# Patient Record
Sex: MALE | Race: WHITE | Employment: OTHER | URBAN - METROPOLITAN AREA
[De-identification: names, ages, dates, MRNs, and addresses within clinical notes are randomized per-mention and may not be internally consistent; named-entity substitution may affect disease eponyms.]

---

## 2022-02-08 DIAGNOSIS — D64.9 ANEMIA, UNSPECIFIED TYPE: ICD-10-CM

## 2022-02-08 RX ORDER — SODIUM CHLORIDE 0.9 % (FLUSH) 0.9 %
5-40 SYRINGE (ML) INJECTION PRN
Status: CANCELLED | OUTPATIENT
Start: 2022-02-09

## 2022-02-08 RX ORDER — ALBUTEROL SULFATE 90 UG/1
4 AEROSOL, METERED RESPIRATORY (INHALATION) PRN
Status: CANCELLED | OUTPATIENT
Start: 2022-02-09

## 2022-02-08 RX ORDER — SODIUM CHLORIDE 9 MG/ML
INJECTION, SOLUTION INTRAVENOUS CONTINUOUS
Status: CANCELLED | OUTPATIENT
Start: 2022-02-09

## 2022-02-08 RX ORDER — DIPHENHYDRAMINE HYDROCHLORIDE 50 MG/ML
50 INJECTION INTRAMUSCULAR; INTRAVENOUS
Status: CANCELLED | OUTPATIENT
Start: 2022-02-09

## 2022-02-08 RX ORDER — HEPARIN SODIUM (PORCINE) LOCK FLUSH IV SOLN 100 UNIT/ML 100 UNIT/ML
500 SOLUTION INTRAVENOUS PRN
Status: CANCELLED | OUTPATIENT
Start: 2022-02-09

## 2022-02-08 RX ORDER — ONDANSETRON 2 MG/ML
8 INJECTION INTRAMUSCULAR; INTRAVENOUS
Status: CANCELLED | OUTPATIENT
Start: 2022-02-09

## 2022-02-08 RX ORDER — ACETAMINOPHEN 325 MG/1
650 TABLET ORAL
Status: CANCELLED | OUTPATIENT
Start: 2022-02-09

## 2022-02-08 RX ORDER — EPINEPHRINE 1 MG/ML
0.3 INJECTION, SOLUTION, CONCENTRATE INTRAVENOUS PRN
Status: CANCELLED | OUTPATIENT
Start: 2022-02-09

## 2022-04-13 ENCOUNTER — HOSPITAL ENCOUNTER (OUTPATIENT)
Dept: INFUSION THERAPY | Age: 77
Setting detail: INFUSION SERIES
Discharge: HOME | End: 2022-04-13
Payer: MEDICARE

## 2022-04-13 VITALS
OXYGEN SATURATION: 98 % | RESPIRATION RATE: 18 BRPM | SYSTOLIC BLOOD PRESSURE: 156 MMHG | TEMPERATURE: 97.9 F | HEART RATE: 65 BPM | DIASTOLIC BLOOD PRESSURE: 74 MMHG

## 2022-04-13 DIAGNOSIS — D64.9 ANEMIA, UNSPECIFIED TYPE: Primary | ICD-10-CM

## 2022-04-13 PROCEDURE — 2580000003 HC RX 258: Performed by: INTERNAL MEDICINE

## 2022-04-13 PROCEDURE — 96361 HYDRATE IV INFUSION ADD-ON: CPT

## 2022-04-13 PROCEDURE — 96365 THER/PROPH/DIAG IV INF INIT: CPT

## 2022-04-13 PROCEDURE — 6360000002 HC RX W HCPCS: Performed by: INTERNAL MEDICINE

## 2022-04-13 RX ORDER — SODIUM CHLORIDE 0.9 % (FLUSH) 0.9 %
5-40 SYRINGE (ML) INJECTION PRN
Status: CANCELLED | OUTPATIENT
Start: 2022-04-20

## 2022-04-13 RX ORDER — HEPARIN SODIUM (PORCINE) LOCK FLUSH IV SOLN 100 UNIT/ML 100 UNIT/ML
500 SOLUTION INTRAVENOUS PRN
Status: CANCELLED | OUTPATIENT
Start: 2022-04-20

## 2022-04-13 RX ORDER — DIPHENHYDRAMINE HYDROCHLORIDE 50 MG/ML
50 INJECTION INTRAMUSCULAR; INTRAVENOUS
Status: CANCELLED | OUTPATIENT
Start: 2022-04-20

## 2022-04-13 RX ORDER — SODIUM CHLORIDE 0.9 % (FLUSH) 0.9 %
5-40 SYRINGE (ML) INJECTION PRN
Status: DISCONTINUED | OUTPATIENT
Start: 2022-04-13 | End: 2022-04-14 | Stop reason: HOSPADM

## 2022-04-13 RX ORDER — SODIUM CHLORIDE 9 MG/ML
INJECTION, SOLUTION INTRAVENOUS CONTINUOUS
Status: CANCELLED | OUTPATIENT
Start: 2022-04-20

## 2022-04-13 RX ORDER — SODIUM CHLORIDE 9 MG/ML
INJECTION, SOLUTION INTRAVENOUS CONTINUOUS
Status: DISCONTINUED | OUTPATIENT
Start: 2022-04-13 | End: 2022-04-14 | Stop reason: HOSPADM

## 2022-04-13 RX ORDER — ONDANSETRON 2 MG/ML
8 INJECTION INTRAMUSCULAR; INTRAVENOUS
Status: CANCELLED | OUTPATIENT
Start: 2022-04-20

## 2022-04-13 RX ORDER — ALBUTEROL SULFATE 90 UG/1
4 AEROSOL, METERED RESPIRATORY (INHALATION) PRN
Status: CANCELLED | OUTPATIENT
Start: 2022-04-20

## 2022-04-13 RX ORDER — ACETAMINOPHEN 325 MG/1
650 TABLET ORAL
Status: CANCELLED | OUTPATIENT
Start: 2022-04-20

## 2022-04-13 RX ORDER — EPINEPHRINE 1 MG/ML
0.3 INJECTION, SOLUTION, CONCENTRATE INTRAVENOUS PRN
Status: CANCELLED | OUTPATIENT
Start: 2022-04-20

## 2022-04-13 RX ADMIN — SODIUM CHLORIDE: 9 INJECTION, SOLUTION INTRAVENOUS at 12:38

## 2022-04-13 RX ADMIN — FERRIC CARBOXYMALTOSE INJECTION 750 MG: 50 INJECTION, SOLUTION INTRAVENOUS at 12:39

## 2022-04-13 RX ADMIN — Medication 10 ML: at 13:01

## 2022-04-13 RX ADMIN — Medication 10 ML: at 12:11

## 2022-04-20 ENCOUNTER — HOSPITAL ENCOUNTER (OUTPATIENT)
Dept: INFUSION THERAPY | Age: 77
Setting detail: INFUSION SERIES
Discharge: HOME | End: 2022-04-20
Payer: MEDICARE

## 2022-04-20 VITALS
SYSTOLIC BLOOD PRESSURE: 169 MMHG | RESPIRATION RATE: 24 BRPM | TEMPERATURE: 98 F | DIASTOLIC BLOOD PRESSURE: 85 MMHG | OXYGEN SATURATION: 97 % | HEART RATE: 78 BPM

## 2022-04-20 DIAGNOSIS — D64.9 ANEMIA, UNSPECIFIED TYPE: Primary | ICD-10-CM

## 2022-04-20 PROCEDURE — 96361 HYDRATE IV INFUSION ADD-ON: CPT

## 2022-04-20 PROCEDURE — 96365 THER/PROPH/DIAG IV INF INIT: CPT

## 2022-04-20 PROCEDURE — 6360000002 HC RX W HCPCS: Performed by: INTERNAL MEDICINE

## 2022-04-20 PROCEDURE — 2580000003 HC RX 258: Performed by: INTERNAL MEDICINE

## 2022-04-20 RX ORDER — HEPARIN SODIUM (PORCINE) LOCK FLUSH IV SOLN 100 UNIT/ML 100 UNIT/ML
500 SOLUTION INTRAVENOUS PRN
OUTPATIENT
Start: 2022-04-20

## 2022-04-20 RX ORDER — ONDANSETRON 2 MG/ML
8 INJECTION INTRAMUSCULAR; INTRAVENOUS
OUTPATIENT
Start: 2022-04-20

## 2022-04-20 RX ORDER — EPINEPHRINE 1 MG/ML
0.3 INJECTION, SOLUTION, CONCENTRATE INTRAVENOUS PRN
OUTPATIENT
Start: 2022-04-20

## 2022-04-20 RX ORDER — SODIUM CHLORIDE 9 MG/ML
INJECTION, SOLUTION INTRAVENOUS CONTINUOUS
OUTPATIENT
Start: 2022-04-20

## 2022-04-20 RX ORDER — DIPHENHYDRAMINE HYDROCHLORIDE 50 MG/ML
50 INJECTION INTRAMUSCULAR; INTRAVENOUS
OUTPATIENT
Start: 2022-04-20

## 2022-04-20 RX ORDER — SODIUM CHLORIDE 9 MG/ML
INJECTION, SOLUTION INTRAVENOUS CONTINUOUS
Status: DISCONTINUED | OUTPATIENT
Start: 2022-04-20 | End: 2022-04-21 | Stop reason: HOSPADM

## 2022-04-20 RX ORDER — ALBUTEROL SULFATE 90 UG/1
4 AEROSOL, METERED RESPIRATORY (INHALATION) PRN
OUTPATIENT
Start: 2022-04-20

## 2022-04-20 RX ORDER — SODIUM CHLORIDE 9 MG/ML
INJECTION, SOLUTION INTRAVENOUS CONTINUOUS
Status: CANCELLED | OUTPATIENT
Start: 2022-04-20

## 2022-04-20 RX ORDER — SODIUM CHLORIDE 0.9 % (FLUSH) 0.9 %
5-40 SYRINGE (ML) INJECTION PRN
Status: DISCONTINUED | OUTPATIENT
Start: 2022-04-20 | End: 2022-04-21 | Stop reason: HOSPADM

## 2022-04-20 RX ORDER — ACETAMINOPHEN 325 MG/1
650 TABLET ORAL
OUTPATIENT
Start: 2022-04-20

## 2022-04-20 RX ORDER — SODIUM CHLORIDE 0.9 % (FLUSH) 0.9 %
5-40 SYRINGE (ML) INJECTION PRN
OUTPATIENT
Start: 2022-04-20

## 2022-04-20 RX ADMIN — FERRIC CARBOXYMALTOSE INJECTION 750 MG: 50 INJECTION, SOLUTION INTRAVENOUS at 12:42

## 2022-04-20 RX ADMIN — Medication 10 ML: at 12:08

## 2022-04-20 RX ADMIN — SODIUM CHLORIDE: 9 INJECTION, SOLUTION INTRAVENOUS at 12:08

## 2023-03-01 LAB
RBC, URINE: NORMAL /HPF (ref 0–5)
WBC, URINE: <1 /HPF (ref 0–5)

## 2023-03-02 LAB — URINE CULTURE: NORMAL

## 2023-08-31 PROBLEM — M67.919 DISORDER OF ROTATOR CUFF: Status: ACTIVE | Noted: 2023-08-31

## 2023-08-31 PROBLEM — D22.70 MELANOCYTIC NEVI OF UNSPECIFIED LOWER LIMB, INCLUDING HIP: Status: ACTIVE | Noted: 2022-12-13

## 2023-08-31 PROBLEM — N35.919 URETHRAL STRICTURE: Status: ACTIVE | Noted: 2023-08-31

## 2023-08-31 PROBLEM — A69.20 LYME DISEASE: Status: ACTIVE | Noted: 2023-08-31

## 2023-08-31 PROBLEM — D22.5 MELANOCYTIC NEVI OF TRUNK: Status: ACTIVE | Noted: 2022-12-13

## 2023-08-31 PROBLEM — Z95.5 HISTORY OF CORONARY ARTERY STENT PLACEMENT: Status: ACTIVE | Noted: 2023-08-31

## 2023-08-31 PROBLEM — L57.0 ACTINIC KERATOSIS: Status: ACTIVE | Noted: 2022-12-13

## 2023-08-31 PROBLEM — D22.60 MELANOCYTIC NEVI OF UNSPECIFIED UPPER LIMB, INCLUDING SHOULDER: Status: ACTIVE | Noted: 2022-12-13

## 2023-08-31 PROBLEM — L81.4 OTHER MELANIN HYPERPIGMENTATION: Status: ACTIVE | Noted: 2022-12-13

## 2023-08-31 PROBLEM — I25.10 CORONARY ARTERY DISEASE: Status: ACTIVE | Noted: 2023-08-31

## 2023-08-31 PROBLEM — I10 ESSENTIAL HYPERTENSION: Status: ACTIVE | Noted: 2023-08-31

## 2023-08-31 PROBLEM — L82.1 OTHER SEBORRHEIC KERATOSIS: Status: ACTIVE | Noted: 2022-12-13

## 2023-08-31 PROBLEM — L30.9 DERMATITIS, UNSPECIFIED: Status: ACTIVE | Noted: 2022-12-13

## 2023-08-31 PROBLEM — D18.01 HEMANGIOMA OF SKIN AND SUBCUTANEOUS TISSUE: Status: ACTIVE | Noted: 2022-12-13

## 2023-08-31 PROBLEM — M67.929 DISORDER OF TENDON OF BICEPS: Status: ACTIVE | Noted: 2023-08-31

## 2023-08-31 PROBLEM — I48.0 PAROXYSMAL ATRIAL FIBRILLATION (MULTI): Status: ACTIVE | Noted: 2023-08-31

## 2023-08-31 PROBLEM — M75.40 IMPINGEMENT SYNDROME OF SHOULDER REGION: Status: ACTIVE | Noted: 2023-08-31

## 2023-08-31 PROBLEM — N40.0 BPH (BENIGN PROSTATIC HYPERPLASIA): Status: ACTIVE | Noted: 2023-08-31

## 2023-08-31 PROBLEM — R39.89 ABNORMAL COMPLIANCE OF BLADDER: Status: ACTIVE | Noted: 2023-08-31

## 2023-08-31 PROBLEM — K21.9 GASTROESOPHAGEAL REFLUX DISEASE: Status: ACTIVE | Noted: 2023-08-31

## 2023-08-31 PROBLEM — L91.8 OTHER HYPERTROPHIC DISORDERS OF THE SKIN: Status: ACTIVE | Noted: 2022-12-13

## 2023-08-31 RX ORDER — METOPROLOL SUCCINATE 50 MG/1
1 TABLET, EXTENDED RELEASE ORAL DAILY
COMMUNITY
Start: 2018-11-19 | End: 2023-10-09 | Stop reason: SDUPTHER

## 2023-08-31 RX ORDER — MORPHINE SULFATE 30 MG/1
1 TABLET ORAL 3 TIMES DAILY
COMMUNITY
Start: 2018-11-19

## 2023-08-31 RX ORDER — LACTULOSE 10 G/15ML
SOLUTION ORAL; RECTAL
COMMUNITY
Start: 2021-04-23 | End: 2023-10-09

## 2023-08-31 RX ORDER — COLCHICINE 0.6 MG/1
0.6 TABLET ORAL
COMMUNITY
Start: 2023-02-08 | End: 2023-10-09 | Stop reason: ALTCHOICE

## 2023-08-31 RX ORDER — DOXYCYCLINE 100 MG/1
100 CAPSULE ORAL
COMMUNITY
Start: 2023-03-06 | End: 2023-10-09 | Stop reason: ALTCHOICE

## 2023-08-31 RX ORDER — ASPIRIN 81 MG/1
1 TABLET ORAL DAILY
COMMUNITY
Start: 2018-11-19

## 2023-08-31 RX ORDER — FAMOTIDINE 40 MG/1
1 TABLET, FILM COATED ORAL DAILY
COMMUNITY
Start: 2020-01-22 | End: 2023-10-09

## 2023-08-31 RX ORDER — TAMSULOSIN HYDROCHLORIDE 0.4 MG/1
0.4 CAPSULE ORAL
COMMUNITY
Start: 2023-03-28 | End: 2023-10-09 | Stop reason: SINTOL

## 2023-08-31 RX ORDER — FLUOROURACIL 50 MG/G
1 CREAM TOPICAL
COMMUNITY
Start: 2022-04-26 | End: 2023-10-09

## 2023-08-31 RX ORDER — ATORVASTATIN CALCIUM 40 MG/1
1 TABLET, FILM COATED ORAL NIGHTLY
COMMUNITY
Start: 2018-11-19 | End: 2024-02-07 | Stop reason: SDUPTHER

## 2023-08-31 RX ORDER — DEXLANSOPRAZOLE 60 MG/1
1 CAPSULE, DELAYED RELEASE ORAL DAILY
COMMUNITY
Start: 2018-11-19

## 2023-10-09 ENCOUNTER — OFFICE VISIT (OUTPATIENT)
Dept: CARDIOLOGY | Facility: HOSPITAL | Age: 78
End: 2023-10-09
Payer: MEDICARE

## 2023-10-09 VITALS
OXYGEN SATURATION: 96 % | SYSTOLIC BLOOD PRESSURE: 151 MMHG | DIASTOLIC BLOOD PRESSURE: 80 MMHG | HEART RATE: 60 BPM | BODY MASS INDEX: 23.35 KG/M2 | WEIGHT: 172.18 LBS

## 2023-10-09 DIAGNOSIS — I10 ESSENTIAL HYPERTENSION: ICD-10-CM

## 2023-10-09 DIAGNOSIS — I48.0 PAROXYSMAL ATRIAL FIBRILLATION (MULTI): Primary | ICD-10-CM

## 2023-10-09 DIAGNOSIS — I25.10 CORONARY ARTERY DISEASE, UNSPECIFIED VESSEL OR LESION TYPE, UNSPECIFIED WHETHER ANGINA PRESENT, UNSPECIFIED WHETHER NATIVE OR TRANSPLANTED HEART: Primary | ICD-10-CM

## 2023-10-09 PROCEDURE — 93005 ELECTROCARDIOGRAM TRACING: CPT | Performed by: NURSE PRACTITIONER

## 2023-10-09 PROCEDURE — 1160F RVW MEDS BY RX/DR IN RCRD: CPT | Performed by: NURSE PRACTITIONER

## 2023-10-09 PROCEDURE — 3079F DIAST BP 80-89 MM HG: CPT | Performed by: NURSE PRACTITIONER

## 2023-10-09 PROCEDURE — 99214 OFFICE O/P EST MOD 30 MIN: CPT | Performed by: NURSE PRACTITIONER

## 2023-10-09 PROCEDURE — 93005 ELECTROCARDIOGRAM TRACING: CPT | Mod: PO | Performed by: NURSE PRACTITIONER

## 2023-10-09 PROCEDURE — 1159F MED LIST DOCD IN RCRD: CPT | Performed by: NURSE PRACTITIONER

## 2023-10-09 PROCEDURE — 3077F SYST BP >= 140 MM HG: CPT | Performed by: NURSE PRACTITIONER

## 2023-10-09 PROCEDURE — 1036F TOBACCO NON-USER: CPT | Performed by: NURSE PRACTITIONER

## 2023-10-09 PROCEDURE — 93010 ELECTROCARDIOGRAM REPORT: CPT | Performed by: INTERNAL MEDICINE

## 2023-10-09 PROCEDURE — 99214 OFFICE O/P EST MOD 30 MIN: CPT | Mod: PO,25 | Performed by: NURSE PRACTITIONER

## 2023-10-09 RX ORDER — METOPROLOL SUCCINATE 50 MG/1
50 TABLET, EXTENDED RELEASE ORAL DAILY
Qty: 90 TABLET | Refills: 3 | Status: ON HOLD | OUTPATIENT
Start: 2023-10-09 | End: 2023-11-17 | Stop reason: SDUPTHER

## 2023-10-09 ASSESSMENT — ENCOUNTER SYMPTOMS: DEPRESSION: 0

## 2023-10-09 NOTE — PROGRESS NOTES
History Of Present Illness:    Naveen Arauz is a 78 y.o. male with a history of PAF (Xarelto), CAD s/p PCI mid RCA, and HLD, he is here for a follow up visit. He states he had an episode of feeling extremely fatigued and broke out into a sweat while walking. He has noticed he has been profusely sweating with activity and is concerned as this was a symptom he had prior to his previous heart attack. He complains of intermittent palpitations, but has not had to take any additional metoprolol recently. He currently denies chest pain, shortness of breath, orthopnea, edema or dizziness.     Review of Systems   Constitutional: Negative.   HENT: Negative.     Eyes: Negative.    Cardiovascular: Negative.    Respiratory: Negative.     Endocrine: Negative.    Skin: Negative.    Musculoskeletal: Negative.    Neurological: Negative.    Psychiatric/Behavioral: Negative.     Allergic/Immunologic: Negative.    All other systems reviewed and are negative.       Past Medical History:  He has a past medical history of Transient cerebral ischemic attack, unspecified.    Past Surgical History:  He has a past surgical history that includes Other surgical history (11/19/2018); Other surgical history (11/19/2018); Other surgical history (11/19/2018); Other surgical history (11/19/2018); MR angio head wo IV contrast (11/4/2018); and MR angio neck wo IV contrast (11/4/2018).      Social History:  He has no history on file for tobacco use, alcohol use, and drug use.    Family History:  Family History   Problem Relation Name Age of Onset    Heart attack Mother      Heart attack Father      Heart attack Other Aunt         Allergies:  Patient has no known allergies.    Outpatient Medications:  Current Outpatient Medications   Medication Instructions    aspirin 81 mg EC tablet 1 tablet, oral, Daily    atorvastatin (Lipitor) 40 mg tablet 1 tablet, oral, Nightly    colchicine (gout) 0.6 mg    dexlansoprazole (Dexilant) 60 mg DR capsule 1 capsule,  oral, Daily    doxycycline (VIBRAMYCIN) 100 mg    famotidine (Pepcid) 40 mg tablet 1 tablet, oral, Daily    fluorouracil (Efudex) 5 % cream 1 Application    lactulose 10 gram/15 mL solution oral    metoprolol succinate XL (Toprol-XL) 50 mg 24 hr tablet 1 tablet, oral, Daily    morphine (MSIR) 30 mg tablet 1 tablet, oral, 2 times daily    oxyCODONE (Oxaydo) 5 mg immediate release tablet 1 tablet, oral, Every 4 hours PRN    raNITIdine (Zantac) 150 mg tablet 1 tablet, oral, Daily    rivaroxaban (Xarelto) 20 mg tablet 1 tablet, oral, Daily    tamsulosin (FLOMAX) 0.4 mg        Last Recorded Vitals:  There were no vitals filed for this visit.    Physical Exam  Vitals reviewed.   HENT:      Head: Normocephalic.      Nose: Nose normal.   Eyes:      Pupils: Pupils are equal, round, and reactive to light.   Cardiovascular:      Rate and Rhythm: Normal rate and regular rhythm.   Pulmonary:      Effort: Pulmonary effort is normal.      Breath sounds: Normal breath sounds.   Abdominal:      General: Abdomen is flat.      Palpations: Abdomen is soft.   Musculoskeletal:         General: Normal range of motion.      Cervical back: Normal range of motion.   Skin:     General: Skin is warm and dry.   Neurological:      General: No focal deficit present.      Mental Status: He is alert and oriented to person, place, and time.   Psychiatric:         Mood and Affect: Mood normal.         Behavior: Behavior normal.       Last Labs:  CBC -  Lab Results   Component Value Date    WBC 6.2 07/13/2021    HGB 11.3 (L) 07/13/2021    HCT 37.4 (L) 07/13/2021    MCV 92 07/13/2021     07/13/2021       CMP -  Lab Results   Component Value Date    CALCIUM 8.6 07/13/2021    PROT 6.6 07/13/2021    ALBUMIN 4.0 07/13/2021    ALBUMIN 4.2 02/12/2019    AST 23 07/13/2021    ALT 12 07/13/2021    ALKPHOS 86 07/13/2021    BILITOT 0.6 07/13/2021       LIPID PANEL -   Lab Results   Component Value Date    CHOL 124 04/12/2019    TRIG 73 04/12/2019    HDL  61.1 04/12/2019    CHHDL 2.0 04/12/2019    LDLF 48 04/12/2019    VLDL 15 04/12/2019       RENAL FUNCTION PANEL -   Lab Results   Component Value Date    GLUCOSE 94 07/13/2021     07/13/2021    K 4.5 07/13/2021     07/13/2021    CO2 31 07/13/2021    ANIONGAP 10 07/13/2021    BUN 9 07/13/2021    CREATININE 0.86 07/13/2021    CALCIUM 8.6 07/13/2021    ALBUMIN 4.0 07/13/2021    ALBUMIN 4.2 02/12/2019        Lab Results   Component Value Date    HGBA1C 5.7 11/04/2018       Last Cardiology Tests:  ECG:10/9/23  Sinus Bradycardia with 1st degree AV block, bpm 57      Echo: 4/30/19  1. The left ventricular systolic function is normal with a 55-60% estimated ejection fraction.   2. There is mild mitral, tricuspid, and pulmonic regurgitation.   3. The left atrium is mildly dilated.    Outside hospital records obtained:  Echo from January 22, 2018:  Ejection fraction 62%  Normal left atrial size  No significant valvular abnormalities      Cath:8/25/15  PCI mid RCA (Xience ALIDA)       Stress Test:  Nuclear stress test from October 2, 2018:  No stress-induced ischemia  Ejection fraction 62%       Cardiac Imaging:  No results found for this or any previous visit from the past 1095 days.        Lab/Diag/Rad Reviewed   Previous provider notes reviewed     Assessment/Plan   Very pleasant 77 year old gentleman with paroxysmal Afib s/p ablation (6/2019), CAD s/p PCI mid RCA (Xience ALIDA) 8/25/2015, and Lyme disease. He complains of intermittent shortness of breath, he had an episode similar to prior stent placement, he will be set up for a nuclear stress test, the dyspnea is an anginal equivalent.     Euvolemic on exam, home heart rate and blood pressure are well, home BP chart reviewed, 130s/70s.     Plan   -NM Stress test  -Continue Metoprolol ER 50 mg once a day   -Continue Aspirin indefinitely   -Continue Atorvastatin and Xarelto   -Follow up pending stress test results           Kaycee Stovall, APRN-CNP

## 2023-10-10 DIAGNOSIS — R06.09 EXERTIONAL DYSPNEA: Primary | ICD-10-CM

## 2023-10-13 RX ORDER — METOPROLOL SUCCINATE 50 MG/1
50 TABLET, EXTENDED RELEASE ORAL DAILY
Qty: 90 TABLET | Refills: 3 | Status: SHIPPED | OUTPATIENT
Start: 2023-10-13 | End: 2024-10-12

## 2023-10-16 ENCOUNTER — APPOINTMENT (OUTPATIENT)
Dept: RADIOLOGY | Facility: HOSPITAL | Age: 78
End: 2023-10-16
Payer: MEDICARE

## 2023-10-20 ENCOUNTER — HOSPITAL ENCOUNTER (OUTPATIENT)
Dept: RADIOLOGY | Facility: HOSPITAL | Age: 78
Discharge: HOME | End: 2023-10-20
Payer: MEDICARE

## 2023-10-20 ENCOUNTER — HOSPITAL ENCOUNTER (OUTPATIENT)
Dept: CARDIOLOGY | Facility: HOSPITAL | Age: 78
Discharge: HOME | End: 2023-10-20
Payer: MEDICARE

## 2023-10-20 DIAGNOSIS — R06.09 EXERTIONAL DYSPNEA: ICD-10-CM

## 2023-10-20 PROCEDURE — 93018 CV STRESS TEST I&R ONLY: CPT | Performed by: RADIOLOGY

## 2023-10-20 PROCEDURE — 3430000001 HC RX 343 DIAGNOSTIC RADIOPHARMACEUTICALS: Performed by: RADIOLOGY

## 2023-10-20 PROCEDURE — A9502 TC99M TETROFOSMIN: HCPCS | Performed by: RADIOLOGY

## 2023-10-20 PROCEDURE — 78452 HT MUSCLE IMAGE SPECT MULT: CPT

## 2023-10-20 PROCEDURE — 93017 CV STRESS TEST TRACING ONLY: CPT

## 2023-10-20 PROCEDURE — 93016 CV STRESS TEST SUPVJ ONLY: CPT | Performed by: INTERNAL MEDICINE

## 2023-10-20 PROCEDURE — 78452 HT MUSCLE IMAGE SPECT MULT: CPT | Performed by: RADIOLOGY

## 2023-10-20 PROCEDURE — 93016 CV STRESS TEST SUPVJ ONLY: CPT | Performed by: RADIOLOGY

## 2023-10-20 RX ADMIN — TETROFOSMIN 10.6 MILLICURIE: 0.23 INJECTION, POWDER, LYOPHILIZED, FOR SOLUTION INTRAVENOUS at 09:35

## 2023-10-20 RX ADMIN — TETROFOSMIN 33.2 MILLICURIE: 0.23 INJECTION, POWDER, LYOPHILIZED, FOR SOLUTION INTRAVENOUS at 11:00

## 2023-11-03 ENCOUNTER — TELEPHONE (OUTPATIENT)
Dept: CARDIOLOGY | Facility: HOSPITAL | Age: 78
End: 2023-11-03
Payer: MEDICARE

## 2023-11-03 NOTE — TELEPHONE ENCOUNTER
----- Message from Mira Sorto RN sent at 11/1/2023  2:05 PM EDT -----  Hi. He does but I can't get a hold of him. I tried multiple times.   ----- Message -----  From: Lara Montesinos RN  Sent: 11/1/2023  10:40 AM EDT  To: Mira Sorto RN    Hi!  Im sorry I was also out of office all week last week and Monday. Does he need a cath obviously?   ----- Message -----  From: Mira Sorto RN  Sent: 10/25/2023   3:56 PM EDT  To: Lara Montesinos RN    Hi Lara,    I have tried reaching this patient several times to let him know his stress was positive. I wanted to forward it to you to follow up on since I will be of the office for a couple of days. Thanks!   ----- Message -----  From: RAMIRO Duffy  Sent: 10/24/2023   1:11 PM EDT  To: Mira Sorto RN    Positive stress test see if he will do a cath  Thanks   ----- Message -----  From: Interface, Radiology Results In  Sent: 10/20/2023  12:43 PM EDT  To: RAMIRO Duffy

## 2023-11-08 ENCOUNTER — TELEPHONE (OUTPATIENT)
Dept: CARDIOLOGY | Facility: HOSPITAL | Age: 78
End: 2023-11-08
Payer: MEDICARE

## 2023-11-14 ENCOUNTER — LAB (OUTPATIENT)
Dept: LAB | Facility: LAB | Age: 78
End: 2023-11-14
Payer: MEDICARE

## 2023-11-14 DIAGNOSIS — R07.9 CHEST PAIN, UNSPECIFIED TYPE: Primary | ICD-10-CM

## 2023-11-14 DIAGNOSIS — R07.9 CHEST PAIN, UNSPECIFIED TYPE: ICD-10-CM

## 2023-11-14 LAB
ANION GAP SERPL CALC-SCNC: 10 MMOL/L (ref 10–20)
BUN SERPL-MCNC: 12 MG/DL (ref 6–23)
CALCIUM SERPL-MCNC: 9.1 MG/DL (ref 8.6–10.3)
CHLORIDE SERPL-SCNC: 103 MMOL/L (ref 98–107)
CO2 SERPL-SCNC: 31 MMOL/L (ref 21–32)
CREAT SERPL-MCNC: 0.96 MG/DL (ref 0.5–1.3)
ERYTHROCYTE [DISTWIDTH] IN BLOOD BY AUTOMATED COUNT: 12.6 % (ref 11.5–14.5)
GFR SERPL CREATININE-BSD FRML MDRD: 82 ML/MIN/1.73M*2
GLUCOSE SERPL-MCNC: 98 MG/DL (ref 74–99)
HCT VFR BLD AUTO: 43.3 % (ref 41–52)
HGB BLD-MCNC: 14 G/DL (ref 13.5–17.5)
INR PPP: 1.5 (ref 0.9–1.1)
MCH RBC QN AUTO: 32.9 PG (ref 26–34)
MCHC RBC AUTO-ENTMCNC: 32.3 G/DL (ref 32–36)
MCV RBC AUTO: 102 FL (ref 80–100)
NRBC BLD-RTO: 0 /100 WBCS (ref 0–0)
PLATELET # BLD AUTO: 171 X10*3/UL (ref 150–450)
POTASSIUM SERPL-SCNC: 4.5 MMOL/L (ref 3.5–5.3)
PROTHROMBIN TIME: 16.6 SECONDS (ref 9.8–12.8)
RBC # BLD AUTO: 4.26 X10*6/UL (ref 4.5–5.9)
SODIUM SERPL-SCNC: 139 MMOL/L (ref 136–145)
WBC # BLD AUTO: 6.8 X10*3/UL (ref 4.4–11.3)

## 2023-11-14 PROCEDURE — 36415 COLL VENOUS BLD VENIPUNCTURE: CPT

## 2023-11-14 PROCEDURE — 80048 BASIC METABOLIC PNL TOTAL CA: CPT

## 2023-11-14 PROCEDURE — 85610 PROTHROMBIN TIME: CPT

## 2023-11-14 PROCEDURE — 85027 COMPLETE CBC AUTOMATED: CPT

## 2023-11-17 ENCOUNTER — HOSPITAL ENCOUNTER (OUTPATIENT)
Facility: HOSPITAL | Age: 78
Setting detail: OUTPATIENT SURGERY
Discharge: HOME | End: 2023-11-17
Attending: INTERNAL MEDICINE | Admitting: INTERNAL MEDICINE
Payer: MEDICARE

## 2023-11-17 VITALS
BODY MASS INDEX: 22.35 KG/M2 | HEIGHT: 72 IN | WEIGHT: 165 LBS | HEART RATE: 60 BPM | RESPIRATION RATE: 16 BRPM | SYSTOLIC BLOOD PRESSURE: 128 MMHG | DIASTOLIC BLOOD PRESSURE: 70 MMHG | OXYGEN SATURATION: 97 %

## 2023-11-17 DIAGNOSIS — I20.9 ANGINA PECTORIS, UNSPECIFIED (CMS-HCC): ICD-10-CM

## 2023-11-17 DIAGNOSIS — R94.39 ABNORMAL RESULT OF OTHER CARDIOVASCULAR FUNCTION STUDY: ICD-10-CM

## 2023-11-17 PROCEDURE — 7100000009 HC PHASE TWO TIME - INITIAL BASE CHARGE: Performed by: INTERNAL MEDICINE

## 2023-11-17 PROCEDURE — 93458 L HRT ARTERY/VENTRICLE ANGIO: CPT | Performed by: INTERNAL MEDICINE

## 2023-11-17 PROCEDURE — 2550000001 HC RX 255 CONTRASTS: Performed by: INTERNAL MEDICINE

## 2023-11-17 PROCEDURE — 7100000010 HC PHASE TWO TIME - EACH INCREMENTAL 1 MINUTE: Performed by: INTERNAL MEDICINE

## 2023-11-17 PROCEDURE — 99152 MOD SED SAME PHYS/QHP 5/>YRS: CPT | Performed by: INTERNAL MEDICINE

## 2023-11-17 PROCEDURE — 2720000007 HC OR 272 NO HCPCS: Performed by: INTERNAL MEDICINE

## 2023-11-17 PROCEDURE — 2500000004 HC RX 250 GENERAL PHARMACY W/ HCPCS (ALT 636 FOR OP/ED): Performed by: INTERNAL MEDICINE

## 2023-11-17 PROCEDURE — 2500000005 HC RX 250 GENERAL PHARMACY W/O HCPCS: Performed by: INTERNAL MEDICINE

## 2023-11-17 PROCEDURE — C1887 CATHETER, GUIDING: HCPCS | Performed by: INTERNAL MEDICINE

## 2023-11-17 PROCEDURE — C1894 INTRO/SHEATH, NON-LASER: HCPCS | Performed by: INTERNAL MEDICINE

## 2023-11-17 RX ORDER — HEPARIN SODIUM 1000 [USP'U]/ML
INJECTION, SOLUTION INTRAVENOUS; SUBCUTANEOUS AS NEEDED
Status: DISCONTINUED | OUTPATIENT
Start: 2023-11-17 | End: 2023-11-17 | Stop reason: HOSPADM

## 2023-11-17 RX ORDER — LIDOCAINE HYDROCHLORIDE 20 MG/ML
INJECTION, SOLUTION INFILTRATION; PERINEURAL AS NEEDED
Status: DISCONTINUED | OUTPATIENT
Start: 2023-11-17 | End: 2023-11-17 | Stop reason: HOSPADM

## 2023-11-17 RX ORDER — NITROGLYCERIN 40 MG/100ML
INJECTION INTRAVENOUS AS NEEDED
Status: DISCONTINUED | OUTPATIENT
Start: 2023-11-17 | End: 2023-11-17 | Stop reason: HOSPADM

## 2023-11-17 RX ORDER — FENTANYL CITRATE 50 UG/ML
INJECTION, SOLUTION INTRAMUSCULAR; INTRAVENOUS AS NEEDED
Status: DISCONTINUED | OUTPATIENT
Start: 2023-11-17 | End: 2023-11-17 | Stop reason: HOSPADM

## 2023-11-17 RX ORDER — MIDAZOLAM HYDROCHLORIDE 1 MG/ML
INJECTION, SOLUTION INTRAMUSCULAR; INTRAVENOUS AS NEEDED
Status: DISCONTINUED | OUTPATIENT
Start: 2023-11-17 | End: 2023-11-17 | Stop reason: HOSPADM

## 2023-11-17 RX ORDER — VERAPAMIL HYDROCHLORIDE 2.5 MG/ML
INJECTION, SOLUTION INTRAVENOUS AS NEEDED
Status: DISCONTINUED | OUTPATIENT
Start: 2023-11-17 | End: 2023-11-17 | Stop reason: HOSPADM

## 2023-11-17 ASSESSMENT — PAIN - FUNCTIONAL ASSESSMENT
PAIN_FUNCTIONAL_ASSESSMENT: 0-10

## 2023-11-17 ASSESSMENT — PAIN SCALES - GENERAL
PAINLEVEL_OUTOF10: 4
PAINLEVEL_OUTOF10: 0 - NO PAIN

## 2023-11-17 ASSESSMENT — COLUMBIA-SUICIDE SEVERITY RATING SCALE - C-SSRS
6. HAVE YOU EVER DONE ANYTHING, STARTED TO DO ANYTHING, OR PREPARED TO DO ANYTHING TO END YOUR LIFE?: NO
2. HAVE YOU ACTUALLY HAD ANY THOUGHTS OF KILLING YOURSELF?: NO
1. IN THE PAST MONTH, HAVE YOU WISHED YOU WERE DEAD OR WISHED YOU COULD GO TO SLEEP AND NOT WAKE UP?: NO

## 2023-11-17 NOTE — SIGNIFICANT EVENT
Past History


Past Medical History:  A-Fib, CVA, Liver Disease, Other


Additional Past Medical Histor:  esophageal bleed; prostate disease


Additional Past Surgical Histo:  to stop esophageal bleed


Alcohol Use:  Sober


Additional Alcohol Information:  


no drink for 5 yrs





General Adult


EDM:


Chief Complaint:  HEAD INJURY/TRAUMA





HPI:


HPI:


72-year-old male presents after syncope and fall.  The patient was shopping at 

[employer] he tells me he started the feel dizzy and he not exactly sure what 

happened after that.  He was unaware that he became completely unconscious.  He 

was a little fuzzy on his first memory when he woke up.  He was noted to be 

repeating himself and not making a lot of sense when he first woke up.  They 

were concerned about hemorrhage or stroke so EMS was called.  EMS gave a similar

report that he was repeating himself and not fully answering their questions.  

He was able to speak without difficulty.  He was put in cervical precautions and

brought to the emergency room.  At this time, he denies any pain.  He is quite 

chatty but appropriate.  He denies fever chills.  History of atrial 

fibrillation.





Review of Systems:


Review of Systems:


Constitutional:  Denies fever or chills 


Eyes:  Denies change in visual acuity 


HENT:  Denies nasal congestion or sore throat 


Respiratory:  Denies cough or shortness of breath 


Cardiovascular:  Denies chest pain or edema 


GI:  Denies abdominal pain, nausea, vomiting, bloody stools or diarrhea 


: Denies dysuria 


Musculoskeletal:  Denies back pain or joint pain 


Integument:  Denies rash 


Neurologic: Head trauma. Denies headache, focal weakness or sensory changes 


Endocrine:  Denies polyuria or polydipsia 


Lymphatic:  Denies swollen glands 


Psychiatric:  Denies depression or anxiety





Heart Score:


Risk Factors:


Risk Factors:  DM, Current or recent (<one month) smoker, HTN, HLP, family 

history of CAD, obesity.


Risk Scores:


Score 0 - 3:  2.5% MACE over next 6 weeks - Discharge Home


Score 4 - 6:  20.3% MACE over next 6 weeks - Admit for Clinical Observation


Score 7 - 10:  72.7% MACE over next 6 weeks - Early Invasive Strategies





Allergies:


Allergies:





Allergies








Coded Allergies Type Severity Reaction Last Updated Verified


 


  No Known Drug Allergies    6/22/20 No











Physical Exam:


PE:





Constitutional: Well developed, well nourished, no acute distress, non-toxic 

appearance. []


HENT: Normocephalic, atraumatic, bilateral external ears normal, oropharynx 

moist, no oral exudates, nose normal. []


Eyes: PERRLA, EOMI, conjunctiva normal, no discharge. [] 


Neck: In a cervical collar, no tenderness [] 


Cardiovascular: Heart rate 115, regular rhythm, no murmur []


Lungs & Thorax:  Bilateral breath sounds clear to auscultation []


Abdomen: Bowel sounds normal, soft, no tenderness, no masses, no pulsatile ma

sses. [] 


Skin: Warm, dry, no erythema, no rash. [] 


Back: No tenderness, no CVA tenderness. [] 


Extremities: No tenderness, no cyanosis, no clubbing, ROM intact, no edema. [] 


Neurologic: Alert and oriented X 3, normal motor function, normal sensory 

function, no focal deficits noted. []


Psychologic: Affect normal, judgement normal, mood normal. []





Current Patient Data:


Labs:





                                Laboratory Tests








Test


 6/22/20


12:57 6/22/20


13:04


 


Glucose (Fingerstick)


 81 mg/dL


(70-99) 





 


White Blood Count


 


 4.2 x10^3/uL


(4.0-11.0)


 


Red Blood Count


 


 3.49 x10^6/uL


(4.30-5.70)  L


 


Hemoglobin


 


 11.7 g/dL


(13.0-17.5)  L


 


Hematocrit


 


 34.5 %


(39.0-53.0)  L


 


Mean Corpuscular Volume


 


 99 fL ()





 


Mean Corpuscular Hemoglobin  34 pg (25-35)  


 


Mean Corpuscular Hemoglobin


Concent 


 34 g/dL


(31-37)


 


Red Cell Distribution Width


 


 14.0 %


(11.5-14.5)


 


Platelet Count


 


 88 x10^3/uL


(140-400)  L


 


Neutrophils (%) (Auto)  68 % (31-73)  


 


Lymphocytes (%) (Auto)  13 % (24-48)  L


 


Monocytes (%) (Auto)  15 % (0-9)  H


 


Eosinophils (%) (Auto)  2 % (0-3)  


 


Basophils (%) (Auto)  2 % (0-3)  


 


Neutrophils # (Auto)


 


 2.8 x10^3uL


(1.8-7.7)


 


Lymphocytes # (Auto)


 


 0.6 x10^3/uL


(1.0-4.8)  L


 


Monocytes # (Auto)


 


 0.6 x10^3/uL


(0.0-1.1)


 


Eosinophils # (Auto)


 


 0.1 x10^3/uL


(0.0-0.7)


 


Basophils # (Auto)


 


 0.1 x10^3/uL


(0.0-0.2)








Vital Signs:





                                   Vital Signs








  Date Time  Temp Pulse Resp B/P (MAP) Pulse Ox O2 Delivery O2 Flow Rate FiO2


 


6/22/20 13:23  119 17 136/89 (105) 97 Room Air  


 


6/22/20 12:56 97.5       











EKG:


EKG:


Sinus tachycardia, rate 109, normal axis, no ST elevations or depressions.  []





Radiology/Procedures:


Radiology/Procedures:


[]


Impressions:


CT head and cervical spine without contrast


 


History: Fall


 


Technique: Noncontrast CT imaging was performed of the head and cervical 


spine. Multiplanar reconstruction images are submitted.  Exposure: One or 


more of the following individualized dose reduction techniques were 


utilized for this examination:  1. Automated exposure control  2. 


Adjustment of the mA and/or kV according to patient size  3. Use of 


iterative reconstruction technique.


 


Head CT 


 


Comparison: None


 


Findings: There is motion degradation. There is some asymmetric 


hyperdensity of the right basal ganglia about 0.6 cm in size. There is 


focus of lower density extending near the cortical surface of the right 


parasagittal parietal occipital lobes. There is also area of lower density


of the left temporal lobe. Ventricular size is considered within normal 


limits. Visualized paranasal sinuses are aerated. Mastoid air cells are 


aerated. No acute calvarial abnormality is identified.


 


Impression:


1. Small focus of hyperdensity of the right basal ganglia is favored to be


due to asymmetric calcification rather than hemorrhage although could be 


followed up to assess stability. There is focus of lower density extending


to the cortical surface of the right parietal occipital lobes, subacute 


infarct possible although could be chronic, better assessed with MRI or 


short-term follow-up CT. There is also focus of lower density of the left 


temporal lobe, possibly chronic versus late subacute infarct.


 


Cervical spine CT 


 


Comparison:  None


 


Findings: No acute cervical spine fracture is identified. Cervical 


vertebral body stature is overall maintained. There is superior endplate 


concavity of T2 without osseous retropulsion, difficult to accurately 


determine age although favored to be older. There is accentuation of 


lordotic curvature cervical spine. There are minimal disc osteophyte 


complexes greatest C5-6 and C6-7. There is moderate to severe degenerative


disc disease greater posteriorly C4-5 through C6-7 and to lesser degree at


C3-4 and C2-3. There is likely borderline central canal stenosis about 10 


mm at C5-6. There is multilevel cervical facet degenerative change, also 


degree of uncovertebral degenerative change. There is moderate to severe 


narrowing of the right C4-5 and C6-7 neural foramina, severe narrowing on 


the right at C5-6, also moderate to severe narrowing on the left at C5-6 


and C6-7. There is mild levoscoliosis. There is small nonspecific lytic 


focus of the right C5 vertebral body about 0.4 cm otherwise too small to 


accurately characterize. There is atherosclerotic calcification of the 


left carotid artery in the neck.


 


Impression:


1. No acute cervical spine fracture is identified.


2. There is multilevel degenerative disc disease. There is spondylosis 


greatest at C5-6 and C6-7.


3. There is multilevel cervical neural foramina compromise due to facet 


and uncovertebral degenerative change.


 


Critical results were discussed with Dr. Domingo at 6/22/2020 1:13 PM.


 


Electronically signed by: Raymundo Gerard MD (6/22/2020 1:16 PM) [id number]














DICTATED AND SIGNED BY:     RAYMUNDO GERARD MD


DATE:     06/22/20 1316





CC: ESME DOMINGO DO; PCP,NO ~





Course & Med Decision Making:


Course & Med Decision Making


Pertinent Labs and Imaging studies reviewed. (See chart for details)


The patient's head CT has some significant findings but they may or may not be 

acute.  See official report for more details.  The patient is much more alert 

and well at this time.  He is able to answer all of my questions without 

difficulty he is very talkative but appropriate.  EKG has the appearance of A. 

fib however there are P waves for every beat.  The patient's labs are 

unremarkable.  His urinalysis is significant for urinary tract infection.  I 

will treat him with a gram of Rocephin in the ED.  I would like to admit the 

patient for interval follow-up of his CT and further observation.  The patient 

has refused.  He will stay for us to give him his antibiotics but he does not 

want to stay the night.  I explained to him he will have to sign out AMA.  That 

is his choice.  After back-and-forth several times the patient has decided that 

[facility] and that [facility] transport.  I 

talked to Dr. Nelson and he has accepted the [facility].  

He will go by ambulance.


[]





Dragon Disclaimer:


Dragon Disclaimer:


This electronic medical record was generated, in whole or in part, using a voice

 recognition dictation system.





Departure


Departure:


Impression:  


   Primary Impression:  


   Closed head injury


   Additional Impression:  


   Concussion


Disposition:  02 XFER SHT-TRM HOSP


Condition:  STABLE


Referrals:  


PCP,NO (PCP)


Scripts


Cephalexin (KEFLEX) 500 Mg Capsule


1 CAP PO TID for UTI for 7 Days, #21 CAP 0 Refills


   Prov: ESME DOMINGO DO         6/22/20





Justification of Admission:


Justification of Admission:


Justification of Admission Dx:  N/A











ESME DOMINGO DO                 Jun 22, 2020 13:31 TR band removed, gauze/tegaderm applied, coban and splint board applied for reinforcement. Discharge instructions given and questions answered. Xarelto to be restarted 11/18/2023 per MD. Blackwell.

## 2023-11-17 NOTE — H&P
History Of Present Illness  Naveen Arauz is a 78 y.o. male presenting with abnormal stress.     Past Medical History  Past Medical History:   Diagnosis Date    Arrhythmia     Coronary artery disease     Hyperlipidemia     Hypertension     Transient cerebral ischemic attack, unspecified     TIA (transient ischemic attack)       Surgical History  Past Surgical History:   Procedure Laterality Date    MR HEAD ANGIO WO IV CONTRAST  11/4/2018    MR HEAD ANGIO WO IV CONTRAST 11/4/2018 [facility]    MR NECK ANGIO WO IV CONTRAST  11/4/2018    MR NECK ANGIO WO IV CONTRAST 11/4/2018 [facility]    OTHER SURGICAL HISTORY  11/19/2018    Stomach surgery    OTHER SURGICAL HISTORY  11/19/2018    Knee surgery    OTHER SURGICAL HISTORY  11/19/2018    Back surgery    OTHER SURGICAL HISTORY  11/19/2018    Cardiac catheterization with stent placement        Social History  He reports that he quit smoking about 50 years ago. His smoking use included cigarettes. He has never used smokeless tobacco. He reports current alcohol use. He reports that he does not use drugs.    Family History  Family History   Problem Relation Name Age of Onset    Heart attack Mother      Heart attack Father      Heart attack Other Aunt         Allergies  Patient has no known allergies.    Review of Systems     Physical Exam     Last Recorded Vitals  Blood pressure 172/84, pulse 71, resp. rate 15, height 1.829 m (6'), weight 74.8 kg (165 lb), SpO2 99 %.    Relevant Results             Assessment/Plan   Active Problems:  There are no active Hospital Problems.  HPI reviewed from 10/9/23----see note for details-    Very pleasant 77 year old gentleman with paroxysmal Afib s/p ablation (6/2019), CAD s/p PCI mid RCA (Xience ALIDA) 8/25/2015, and Lyme disease. Having nonspecific dyspnea--stress test abnormal.  Plan for [facility] today.             Manuel Blackwell MD

## 2023-11-22 ENCOUNTER — OFFICE VISIT (OUTPATIENT)
Dept: UROLOGY | Facility: CLINIC | Age: 78
End: 2023-11-22
Payer: MEDICARE

## 2023-11-22 DIAGNOSIS — N40.1 BENIGN PROSTATIC HYPERPLASIA WITH URINARY FREQUENCY: Primary | ICD-10-CM

## 2023-11-22 DIAGNOSIS — N35.919 STRICTURE OF MALE URETHRA, UNSPECIFIED STRICTURE TYPE: ICD-10-CM

## 2023-11-22 DIAGNOSIS — R35.0 BENIGN PROSTATIC HYPERPLASIA WITH URINARY FREQUENCY: Primary | ICD-10-CM

## 2023-11-22 LAB
APPEARANCE UR: CLEAR
BILIRUB UR STRIP.AUTO-MCNC: NEGATIVE MG/DL
COLOR UR: YELLOW
GLUCOSE UR STRIP.AUTO-MCNC: NEGATIVE MG/DL
KETONES UR STRIP.AUTO-MCNC: NEGATIVE MG/DL
LEUKOCYTE ESTERASE UR QL STRIP.AUTO: NEGATIVE
NITRITE UR QL STRIP.AUTO: NEGATIVE
PH UR STRIP.AUTO: 5 [PH]
PROT UR STRIP.AUTO-MCNC: NEGATIVE MG/DL
RBC # UR STRIP.AUTO: NEGATIVE /UL
SP GR UR STRIP.AUTO: 1.01
UROBILINOGEN UR STRIP.AUTO-MCNC: <2 MG/DL

## 2023-11-22 PROCEDURE — 1036F TOBACCO NON-USER: CPT | Performed by: STUDENT IN AN ORGANIZED HEALTH CARE EDUCATION/TRAINING PROGRAM

## 2023-11-22 PROCEDURE — 81003 URINALYSIS AUTO W/O SCOPE: CPT

## 2023-11-22 PROCEDURE — 99213 OFFICE O/P EST LOW 20 MIN: CPT | Performed by: STUDENT IN AN ORGANIZED HEALTH CARE EDUCATION/TRAINING PROGRAM

## 2023-11-22 PROCEDURE — 87086 URINE CULTURE/COLONY COUNT: CPT

## 2023-11-22 PROCEDURE — 1160F RVW MEDS BY RX/DR IN RCRD: CPT | Performed by: STUDENT IN AN ORGANIZED HEALTH CARE EDUCATION/TRAINING PROGRAM

## 2023-11-22 PROCEDURE — 1159F MED LIST DOCD IN RCRD: CPT | Performed by: STUDENT IN AN ORGANIZED HEALTH CARE EDUCATION/TRAINING PROGRAM

## 2023-11-22 PROCEDURE — 1126F AMNT PAIN NOTED NONE PRSNT: CPT | Performed by: STUDENT IN AN ORGANIZED HEALTH CARE EDUCATION/TRAINING PROGRAM

## 2023-11-22 NOTE — PROGRESS NOTES
Subjective   Patient ID: Naveen Arauz is a 78 y.o. male.    HPI  77 yo male F/U obstructive voiding symptoms, on tamsulosin. Cystoscopy from April 2023 revealed urethral stricture which was dilated, penile, bulbar, prostatic BPH, s/p Mejía. Normal bladder.     He has slower urinary flow again. Reports nocturia has improved and does not wake up as much at nighttime.     Review of Systems   All other systems reviewed and are negative.      Objective   Physical Exam  Vitals reviewed.         Assessment/Plan   77 yo male F/U obstructive voiding symptoms, on tamsulosin. Cystoscopy from April 2023 revealed urethral stricture which was dilated, penile, bulbar, prostatic BPH, s/p Mejía. Normal bladder. Is having slow urinary flow now.     We reviewed his options which include referral to reconstructive urology vs reopening stricture and teaching him self catheterization. He understands his options and would like to consult with reconstructive urology.     Plan:   UA, U culture to rule out infection.   Referral to reconstructive urology for consultation.     Diagnoses and all orders for this visit:  Benign prostatic hyperplasia with urinary frequency  -     Urine Culture  -     Urinalysis with Reflex Microscopic  Stricture of male urethra, unspecified stricture type  -     Referral to Urology; Future  -     FL urethrocystography retrograde; Future

## 2023-11-24 LAB — BACTERIA UR CULT: NORMAL

## 2023-12-02 ENCOUNTER — ANCILLARY PROCEDURE (OUTPATIENT)
Dept: RADIOLOGY | Facility: CLINIC | Age: 78
End: 2023-12-02
Payer: MEDICARE

## 2023-12-02 DIAGNOSIS — M72.0 PALMAR FASCIAL FIBROMATOSIS (DUPUYTREN): ICD-10-CM

## 2023-12-02 DIAGNOSIS — M65.312 TRIGGER THUMB, LEFT THUMB: ICD-10-CM

## 2023-12-02 DIAGNOSIS — M65.311 TRIGGER THUMB, RIGHT THUMB: ICD-10-CM

## 2023-12-02 DIAGNOSIS — M79.642 PAIN IN LEFT HAND: ICD-10-CM

## 2023-12-02 DIAGNOSIS — M79.641 PAIN IN RIGHT HAND: ICD-10-CM

## 2023-12-02 PROCEDURE — 73130 X-RAY EXAM OF HAND: CPT | Mod: LEFT SIDE | Performed by: RADIOLOGY

## 2023-12-02 PROCEDURE — 73130 X-RAY EXAM OF HAND: CPT | Mod: RIGHT SIDE | Performed by: RADIOLOGY

## 2023-12-02 PROCEDURE — 73130 X-RAY EXAM OF HAND: CPT | Mod: RT,FY

## 2023-12-02 PROCEDURE — 73130 X-RAY EXAM OF HAND: CPT | Mod: LT,FY

## 2023-12-11 ENCOUNTER — OFFICE VISIT (OUTPATIENT)
Dept: ORTHOPEDIC SURGERY | Facility: CLINIC | Age: 78
End: 2023-12-11
Payer: MEDICARE

## 2023-12-11 DIAGNOSIS — M65.30 TRIGGER FINGER, ACQUIRED: Primary | ICD-10-CM

## 2023-12-11 PROCEDURE — 1036F TOBACCO NON-USER: CPT | Performed by: ORTHOPAEDIC SURGERY

## 2023-12-11 PROCEDURE — 1159F MED LIST DOCD IN RCRD: CPT | Performed by: ORTHOPAEDIC SURGERY

## 2023-12-11 PROCEDURE — 99203 OFFICE O/P NEW LOW 30 MIN: CPT | Performed by: ORTHOPAEDIC SURGERY

## 2023-12-11 PROCEDURE — 1126F AMNT PAIN NOTED NONE PRSNT: CPT | Performed by: ORTHOPAEDIC SURGERY

## 2023-12-11 PROCEDURE — 1160F RVW MEDS BY RX/DR IN RCRD: CPT | Performed by: ORTHOPAEDIC SURGERY

## 2023-12-11 PROCEDURE — 20550 NJX 1 TENDON SHEATH/LIGAMENT: CPT | Performed by: ORTHOPAEDIC SURGERY

## 2023-12-11 RX ORDER — LIDOCAINE HYDROCHLORIDE 10 MG/ML
0.5 INJECTION INFILTRATION; PERINEURAL
Status: COMPLETED | OUTPATIENT
Start: 2023-12-11 | End: 2023-12-11

## 2023-12-11 RX ORDER — TRIAMCINOLONE ACETONIDE 40 MG/ML
40 INJECTION, SUSPENSION INTRA-ARTICULAR; INTRAMUSCULAR
Status: COMPLETED | OUTPATIENT
Start: 2023-12-11 | End: 2023-12-11

## 2023-12-11 RX ADMIN — TRIAMCINOLONE ACETONIDE 40 MG: 40 INJECTION, SUSPENSION INTRA-ARTICULAR; INTRAMUSCULAR at 19:44

## 2023-12-11 RX ADMIN — LIDOCAINE HYDROCHLORIDE 0.5 ML: 10 INJECTION INFILTRATION; PERINEURAL at 19:44

## 2023-12-12 NOTE — PROGRESS NOTES
History of Present Illness:  Chief Complaint   Patient presents with    Right Hand - Pain    Left Hand - Pain       Patient presents today for evaluation of bilateral thumb pain. Endorses catching of the digit with flexion. Denies any traumatic event or injury. The patient notes that it is worse with periods of disuse and in the morning.  Somewhat better with rest.  The patient endorses sharp focal pain when it catches.  The patient denies any numbness and tingling.  Patient has also noted progressive contractures of bilateral hands.  Not currently interfering with his regular activities.    Past Medical History:   Diagnosis Date    Arrhythmia     Coronary artery disease     Hyperlipidemia     Hypertension     Transient cerebral ischemic attack, unspecified     TIA (transient ischemic attack)       Medication Documentation Review Audit       Reviewed by Nitza Wiggins MA (Medical Assistant) on 12/11/23 at 1026      Medication Order Taking? Sig Documenting Provider Last Dose Status   aspirin 81 mg EC tablet [id number] No Take 1 tablet (81 mg) by mouth once daily. Historical Provider, MD 11/16/2023 Active   atorvastatin (Lipitor) 40 mg tablet [id number] No Take 1 tablet (40 mg) by mouth once daily at bedtime. Historical Provider, MD 11/16/2023 Active   dexlansoprazole (Dexilant) 60 mg DR capsule [id number] No Take 1 capsule (60 mg) by mouth once daily. Historical Provider, MD 11/16/2023 Active   metoprolol succinate XL (Toprol-XL) 50 mg 24 hr tablet [id number] No Take 1 tablet (50 mg) by mouth once daily. Elaine Welch, APRN-CNP 11/16/2023 Active   morphine (MSIR) 30 mg tablet [id number] No Take 1 tablet (30 mg) by mouth 3 times a day. Historical Provider, MD 11/16/2023 Active   rivaroxaban (Xarelto) 20 mg tablet [id number] No Take 1 tablet (20 mg) by mouth once daily. Historical Provider, MD Past Week Active                    No Known Allergies    Social History     Socioeconomic History    Marital status:       Spouse name: Not on file    Number of children: Not on file    Years of education: Not on file    Highest education level: Not on file   Occupational History    Not on file   Tobacco Use    Smoking status: Former     Types: Cigarettes     Quit date:      Years since quittin.9    Smokeless tobacco: Never   Substance and Sexual Activity    Alcohol use: Yes     Comment: Occasional    Drug use: Never    Sexual activity: Defer   Other Topics Concern    Not on file   Social History Narrative    Not on file     Social Determinants of Health     Financial Resource Strain: Not on file   Food Insecurity: Not on file   Transportation Needs: Not on file   Physical Activity: Not on file   Stress: Not on file   Social Connections: Not on file   Intimate Partner Violence: Not on file   Housing Stability: Not on file       Past Surgical History:   Procedure Laterality Date    CARDIAC CATHETERIZATION N/A 2023    Procedure: Left Heart Cath;  Surgeon: Manuel Blackwell MD;  Location: [facility] Cardiac Cath [facility];  Service: Cardiovascular;  Laterality: N/A;    MR HEAD ANGIO WO IV CONTRAST  2018    MR HEAD ANGIO WO IV CONTRAST 2018 [facility]    MR NECK ANGIO WO IV CONTRAST  2018    MR NECK ANGIO WO IV CONTRAST 2018 [facility]    OTHER SURGICAL HISTORY  2018    Stomach surgery    OTHER SURGICAL HISTORY  2018    Knee surgery    OTHER SURGICAL HISTORY  2018    Back surgery    OTHER SURGICAL HISTORY  2018    Cardiac catheterization with stent placement          Review of Systems   GENERAL: Negative for malaise, significant weight loss, fever  MUSCULOSKELETAL: see HPI  NEURO:  Negative     Physical Examination  Constitutional: Appears well-developed and well-nourished.  Head: Normocephalic and atraumatic.  Eyes: EOMI grossly  Cardiovascular: Intact distal pulses.   Respiratory: Effort normal. No respiratory distress.  Neurologic: Alert and oriented to person, place, and  time.  Skin: Skin is warm and dry.  Hematologic / Lymphatic: No lymphedema, lymphangitis.  Psychiatric: normal mood and affect. Behavior is normal.   Musculoskeletal:  bilateral hand  No obvious atrophy, no skin changes  Tenderness, palpable nodule along the thumb flexor tendon sheath  Palpable catching/crepitus with active flexion and extension   No subluxation of the extensor tendon appreciated over the MP/IP joints.  Sensation intact distally.  Capillary refill less than 2 seconds distally.  Palpable Dupuytren's tissue in line with left middle and ring fingers as well as right ring and small fingers.  Left ring finger with 15 degree PIP contracture and left middle and ring finger with 10 degree MCP contracture.  Right small finger with 35 degree PIP contracture and right ring and small finger with 15 degree MCP contractures.    Bilateral hand radiographs from December 2, 2023 available for review: No fracture/dislocation.  Degenerative changes noted.     Assessment:  Patient with bilateral thumb trigger finger and Dupuytren's disease primarily involving left middle and ring fingers and right ring and small fingers.     Plan:  Nature of the diagnoses were discussed with the patient.  We also discussed the risks and benefits of treatment options for trigger finger.  These include splinting, corticosteroid injections and, if conservative options fail, surgical release.  Patients with diabetes have lower chance of permanent successful resolution of symptoms with injections in addition to the fact that corticosteroid injection will raise blood glucose levels for about 5 days after injection.  Longer standing trigger finger also reduces likelihood of successful resolution with corticosteroid injection.    The patient elected for bilateral thumb corticosteroid injections.    We discussed potential treatment options for Dupuytren's disease including observation, needle aponeurotomy, collagenase injections as well as  potential surgical intervention.  Patient would like to continue with observation for the Dupuytren's tissue at this time.    Patient ID: Naveen Arauz is a 78 y.o. male.    Hand / UE Inj/Asp: bilateral thumb A1 for trigger finger on 12/11/2023 7:44 PM  Indications: pain (triggering)  Details: 25 G needle, volar approach  Medications (Right): 40 mg triamcinolone acetonide 40 mg/mL; 0.5 mL lidocaine 10 mg/mL (1 %)  Medications (Left): 40 mg triamcinolone acetonide 40 mg/mL; 0.5 mL lidocaine 10 mg/mL (1 %)  Outcome: tolerated well, no immediate complications  Procedure, treatment alternatives, risks and benefits explained, specific risks discussed. Consent was given by the patient. Immediately prior to procedure a time out was called to verify the correct patient, procedure, equipment, support staff and site/side marked as required.

## 2024-02-07 DIAGNOSIS — E78.5 HYPERLIPIDEMIA, UNSPECIFIED HYPERLIPIDEMIA TYPE: Primary | ICD-10-CM

## 2024-02-07 RX ORDER — ATORVASTATIN CALCIUM 40 MG/1
40 TABLET, FILM COATED ORAL NIGHTLY
Qty: 90 TABLET | Refills: 1 | Status: SHIPPED | OUTPATIENT
Start: 2024-02-07

## 2024-02-17 LAB
ATRIAL RATE: 57 BPM
P AXIS: 92 DEGREES
P OFFSET: 151 MS
P ONSET: 116 MS
PR INTERVAL: 210 MS
Q ONSET: 221 MS
QRS COUNT: 10 BEATS
QRS DURATION: 78 MS
QT INTERVAL: 414 MS
QTC CALCULATION(BAZETT): 402 MS
QTC FREDERICIA: 407 MS
R AXIS: 59 DEGREES
T AXIS: 69 DEGREES
T OFFSET: 428 MS
VENTRICULAR RATE: 57 BPM

## 2024-04-15 ENCOUNTER — OFFICE VISIT (OUTPATIENT)
Dept: ORTHOPEDIC SURGERY | Facility: CLINIC | Age: 79
End: 2024-04-15
Payer: MEDICARE

## 2024-04-15 DIAGNOSIS — M19.049 CMC ARTHRITIS: ICD-10-CM

## 2024-04-15 PROCEDURE — 1125F AMNT PAIN NOTED PAIN PRSNT: CPT | Performed by: ORTHOPAEDIC SURGERY

## 2024-04-15 PROCEDURE — 99214 OFFICE O/P EST MOD 30 MIN: CPT | Performed by: ORTHOPAEDIC SURGERY

## 2024-04-15 PROCEDURE — 1157F ADVNC CARE PLAN IN RCRD: CPT | Performed by: ORTHOPAEDIC SURGERY

## 2024-04-15 PROCEDURE — 1036F TOBACCO NON-USER: CPT | Performed by: ORTHOPAEDIC SURGERY

## 2024-04-15 PROCEDURE — 1160F RVW MEDS BY RX/DR IN RCRD: CPT | Performed by: ORTHOPAEDIC SURGERY

## 2024-04-15 PROCEDURE — L3924 HFO WITHOUT JOINTS PRE OTS: HCPCS | Performed by: ORTHOPAEDIC SURGERY

## 2024-04-15 PROCEDURE — 1159F MED LIST DOCD IN RCRD: CPT | Performed by: ORTHOPAEDIC SURGERY

## 2024-04-15 ASSESSMENT — PAIN SCALES - GENERAL: PAINLEVEL_OUTOF10: 2

## 2024-04-15 ASSESSMENT — PAIN - FUNCTIONAL ASSESSMENT: PAIN_FUNCTIONAL_ASSESSMENT: 0-10

## 2024-04-16 PROCEDURE — 20600 DRAIN/INJ JOINT/BURSA W/O US: CPT | Performed by: ORTHOPAEDIC SURGERY

## 2024-04-16 RX ORDER — TRIAMCINOLONE ACETONIDE 40 MG/ML
20 INJECTION, SUSPENSION INTRA-ARTICULAR; INTRAMUSCULAR
Status: COMPLETED | OUTPATIENT
Start: 2024-04-16 | End: 2024-04-16

## 2024-04-16 RX ORDER — LIDOCAINE HYDROCHLORIDE 10 MG/ML
0.5 INJECTION INFILTRATION; PERINEURAL
Status: COMPLETED | OUTPATIENT
Start: 2024-04-16 | End: 2024-04-16

## 2024-04-16 RX ADMIN — TRIAMCINOLONE ACETONIDE 20 MG: 40 INJECTION, SUSPENSION INTRA-ARTICULAR; INTRAMUSCULAR at 16:08

## 2024-04-16 RX ADMIN — LIDOCAINE HYDROCHLORIDE 0.5 ML: 10 INJECTION INFILTRATION; PERINEURAL at 16:08

## 2024-04-16 NOTE — PROGRESS NOTES
History of Present Illness:  Chief Complaint   Patient presents with    Left Hand - Follow-up     Left hand contracture    Right Hand - Follow-up     Right hand contracture        Patient previously seen for bilateral trigger thumbs as well as Dupuytren's disease.  He is presenting today for evaluation of left basilar thumb pain.  Patient states that pain is worse with gripping and pinching.  He describes a constant aching pain that is intermittently sharp.  No numbness or tingling.  No significant change in Dupuytren's contractures since he was last seen.    Past Medical History:   Diagnosis Date    Arrhythmia     Coronary artery disease     Hyperlipidemia     Hypertension     Transient cerebral ischemic attack, unspecified     TIA (transient ischemic attack)       Medication Documentation Review Audit       Reviewed by Yuliana Cano CMA (Medical Assistant) on 04/15/24 at 1503      Medication Order Taking? Sig Documenting Provider Last Dose Status   aspirin 81 mg EC tablet [id number] No Take 1 tablet (81 mg) by mouth once daily. Historical Provider, MD 11/16/2023 Active   atorvastatin (Lipitor) 40 mg tablet [id number]  Take 1 tablet (40 mg) by mouth once daily at bedtime. Elaine Welch, APRN-CNP  Active   dexlansoprazole (Dexilant) 60 mg DR capsule [id number] No Take 1 capsule (60 mg) by mouth once daily. Historical Provider, MD 11/16/2023 Active   metoprolol succinate XL (Toprol-XL) 50 mg 24 hr tablet [id number] No Take 1 tablet (50 mg) by mouth once daily. Elaine Welch APRN-CNP 11/16/2023 Active   morphine (MSIR) 30 mg tablet [id number] No Take 1 tablet (30 mg) by mouth 3 times a day. Historical Provider, MD 11/16/2023 Active   rivaroxaban (Xarelto) 20 mg tablet [id number] No Take 1 tablet (20 mg) by mouth once daily. Historical Provider, MD Past Week Active                    No Known Allergies    Social History     Socioeconomic History    Marital status:      Spouse name: Not on file    Number of  children: Not on file    Years of education: Not on file    Highest education level: Not on file   Occupational History    Not on file   Tobacco Use    Smoking status: Former     Current packs/day: 0.00     Types: Cigarettes     Quit date:      Years since quittin.3    Smokeless tobacco: Never   Substance and Sexual Activity    Alcohol use: Yes     Comment: Occasional    Drug use: Never    Sexual activity: Defer   Other Topics Concern    Not on file   Social History Narrative    Not on file     Social Determinants of Health     Financial Resource Strain: Not on file   Food Insecurity: Not on file   Transportation Needs: Not on file   Physical Activity: Not on file   Stress: Not on file   Social Connections: Not on file   Intimate Partner Violence: Not on file   Housing Stability: Not on file       Past Surgical History:   Procedure Laterality Date    CARDIAC CATHETERIZATION N/A 2023    Procedure: Left Heart Cath;  Surgeon: Manuel Blackwell MD;  Location: [facility] Cardiac [facility];  Service: Cardiovascular;  Laterality: N/A;    MR HEAD ANGIO WO IV CONTRAST  2018    MR HEAD ANGIO WO IV CONTRAST 2018 [facility]    MR NECK ANGIO WO IV CONTRAST  2018    MR NECK ANGIO WO IV CONTRAST 2018 [facility]    OTHER SURGICAL HISTORY  2018    Stomach surgery    OTHER SURGICAL HISTORY  2018    Knee surgery    OTHER SURGICAL HISTORY  2018    Back surgery    OTHER SURGICAL HISTORY  2018    Cardiac catheterization with stent placement        Review of Systems   GENERAL: Negative for malaise, significant weight loss, fever  MUSCULOSKELETAL: see HPI  NEURO:  Negative     Physical Examination  Constitutional: Appears well-developed and well-nourished.  Head: Normocephalic and atraumatic.  Eyes: EOMI grossly  Cardiovascular: Intact distal pulses.   Respiratory: Effort normal. No respiratory distress.  Neurologic: Alert and oriented to person, place, and  time.  Skin: Skin is warm and dry.  Hematologic / Lymphatic: No lymphedema, lymphangitis.  Psychiatric: normal mood and affect. Behavior is normal.   Musculoskeletal:  Left thumb: Tenderness about CMC joint with positive CMC grind.  No tenderness about A1 pulley.    Bilateral hands with visible and palpable Dupuytren's tissue.  Contractures into left middle and ring fingers as well as right ring and small fingers.       Assessment:  Patient with bilateral Dupuytren's disease that is not overly symptomatic.  Also with increasingly symptomatic left thumb CMC arthritis.     Plan:  We once again reviewed diagnosis of Dupuytren's disease.  Patient wishes to continue with observation.  Treatments of thumb CMC arthritis were discussed with the patient.  This includes nonoperative treatment with symptomatic splinting, anti-inflammatories, corticosteroid injections and possible surgical intervention if conservative measures prove unsuccessful.  Patient wishes to proceed with corticosteroid injection.    Patient ID: Naveen Arauz is a 79 y.o. male.    S Inj/Asp: L thumb CMC on 4/16/2024 4:08 PM  Indications: pain  Details: 25 G needle (dorsoradial) approach  Medications: 20 mg triamcinolone acetonide 40 mg/mL; 0.5 mL lidocaine 10 mg/mL (1 %)  Outcome: tolerated well, no immediate complications  Procedure, treatment alternatives, risks and benefits explained, specific risks discussed. Consent was given by the patient. Immediately prior to procedure a time out was called to verify the correct patient, procedure, equipment, support staff and site/side marked as required. Patient was prepped and draped in the usual sterile fashion.

## 2024-08-18 DIAGNOSIS — E78.5 HYPERLIPIDEMIA, UNSPECIFIED HYPERLIPIDEMIA TYPE: ICD-10-CM

## 2024-08-20 RX ORDER — ATORVASTATIN CALCIUM 40 MG/1
40 TABLET, FILM COATED ORAL NIGHTLY
Qty: 90 TABLET | Refills: 0 | Status: SHIPPED | OUTPATIENT
Start: 2024-08-20

## 2024-10-07 ENCOUNTER — OFFICE VISIT (OUTPATIENT)
Dept: CARDIOLOGY | Facility: HOSPITAL | Age: 79
End: 2024-10-07
Payer: MEDICARE

## 2024-10-07 VITALS
DIASTOLIC BLOOD PRESSURE: 83 MMHG | HEART RATE: 68 BPM | BODY MASS INDEX: 23.47 KG/M2 | OXYGEN SATURATION: 96 % | SYSTOLIC BLOOD PRESSURE: 172 MMHG | WEIGHT: 173.06 LBS

## 2024-10-07 DIAGNOSIS — M10.9 GOUT, UNSPECIFIED CAUSE, UNSPECIFIED CHRONICITY, UNSPECIFIED SITE: ICD-10-CM

## 2024-10-07 DIAGNOSIS — I48.0 PAROXYSMAL ATRIAL FIBRILLATION (MULTI): ICD-10-CM

## 2024-10-07 DIAGNOSIS — I10 ESSENTIAL HYPERTENSION: Primary | ICD-10-CM

## 2024-10-07 LAB
ATRIAL RATE: 67 BPM
P AXIS: 87 DEGREES
P OFFSET: 163 MS
P ONSET: 123 MS
PR INTERVAL: 196 MS
Q ONSET: 221 MS
QRS COUNT: 11 BEATS
QRS DURATION: 88 MS
QT INTERVAL: 398 MS
QTC CALCULATION(BAZETT): 420 MS
QTC FREDERICIA: 413 MS
R AXIS: 57 DEGREES
T AXIS: 68 DEGREES
T OFFSET: 420 MS
VENTRICULAR RATE: 67 BPM

## 2024-10-07 PROCEDURE — 99213 OFFICE O/P EST LOW 20 MIN: CPT | Performed by: NURSE PRACTITIONER

## 2024-10-07 PROCEDURE — G2211 COMPLEX E/M VISIT ADD ON: HCPCS | Performed by: NURSE PRACTITIONER

## 2024-10-07 PROCEDURE — 1036F TOBACCO NON-USER: CPT | Performed by: NURSE PRACTITIONER

## 2024-10-07 PROCEDURE — 1159F MED LIST DOCD IN RCRD: CPT | Performed by: NURSE PRACTITIONER

## 2024-10-07 PROCEDURE — 3077F SYST BP >= 140 MM HG: CPT | Performed by: NURSE PRACTITIONER

## 2024-10-07 PROCEDURE — 93005 ELECTROCARDIOGRAM TRACING: CPT | Performed by: NURSE PRACTITIONER

## 2024-10-07 PROCEDURE — 1157F ADVNC CARE PLAN IN RCRD: CPT | Performed by: NURSE PRACTITIONER

## 2024-10-07 PROCEDURE — 3079F DIAST BP 80-89 MM HG: CPT | Performed by: NURSE PRACTITIONER

## 2024-10-07 RX ORDER — COLCHICINE 0.6 MG/1
0.6 TABLET ORAL DAILY
Qty: 30 TABLET | Refills: 11 | Status: SHIPPED | OUTPATIENT
Start: 2024-10-07 | End: 2025-10-07

## 2024-10-07 NOTE — PROGRESS NOTES
History Of Present Illness:    Naveen Arauz is a 79 y.o. male with a history of PAF (Xarelto), CAD s/p PCI mid RCA, and HLD, he is here for a follow up visit. The patient is seen in collaboration with Dr. Blackwell. Occasional shortness of breath. Complains of sweating. Currently has gout. Denies chest pain or heart palpitations. Continues to stay active.       Review of Systems   Constitutional: Negative.   HENT: Negative.     Eyes: Negative.    Cardiovascular: Negative.    Respiratory: Negative.     Endocrine: Negative.    Skin: Negative.    Musculoskeletal: Negative.    Neurological: Negative.    Psychiatric/Behavioral: Negative.     Allergic/Immunologic: Negative.    All other systems reviewed and are negative.       Past Medical History:  He has a past medical history of Arrhythmia, Coronary artery disease, Hyperlipidemia, Hypertension, and Transient cerebral ischemic attack, unspecified.    Past Surgical History:  He has a past surgical history that includes Other surgical history (11/19/2018); Other surgical history (11/19/2018); Other surgical history (11/19/2018); Other surgical history (11/19/2018); MR angio head wo IV contrast (11/4/2018); MR angio neck wo IV contrast (11/4/2018); and Cardiac catheterization (N/A, 11/17/2023).      Social History:  He reports that he quit smoking about 51 years ago. His smoking use included cigarettes. He has never used smokeless tobacco. He reports current alcohol use. He reports that he does not use drugs.    Family History:  Family History   Problem Relation Name Age of Onset    Heart attack Mother      Heart attack Father      Heart attack Other Aunt         Allergies:  Patient has no known allergies.    Outpatient Medications:  Current Outpatient Medications   Medication Instructions    aspirin 81 mg EC tablet 1 tablet, oral, Daily    atorvastatin (LIPITOR) 40 mg, oral, Nightly    dexlansoprazole (Dexilant) 60 mg DR capsule 1 capsule, oral, Daily    metoprolol  succinate XL (TOPROL-XL) 50 mg, oral, Daily    morphine (MSIR) 30 mg tablet 1 tablet, oral, 2 times daily    rivaroxaban (Xarelto) 20 mg tablet 1 tablet, oral, Daily        Last Recorded Vitals:  Vitals:    10/07/24 1020   BP: 172/83   Pulse: 68   SpO2: 96%   Weight: 78.5 kg (173 lb 1 oz)       Physical Exam  Vitals reviewed.   HENT:      Head: Normocephalic.      Nose: Nose normal.   Eyes:      Pupils: Pupils are equal, round, and reactive to light.   Cardiovascular:      Rate and Rhythm: Normal rate and regular rhythm.   Pulmonary:      Effort: Pulmonary effort is normal.      Breath sounds: Normal breath sounds.   Abdominal:      General: Abdomen is flat.      Palpations: Abdomen is soft.   Musculoskeletal:         General: Normal range of motion.      Cervical back: Normal range of motion.   Skin:     General: Skin is warm and dry.   Neurological:      General: No focal deficit present.      Mental Status: He is alert and oriented to person, place, and time.   Psychiatric:         Mood and Affect: Mood normal.         Behavior: Behavior normal.       Last Labs:  CBC -  Lab Results   Component Value Date    WBC 6.8 11/14/2023    HGB 14.0 11/14/2023    HCT 43.3 11/14/2023     (H) 11/14/2023     11/14/2023       CMP -  Lab Results   Component Value Date    CALCIUM 9.1 11/14/2023    PROT 6.6 07/13/2021    ALBUMIN 4.0 07/13/2021    ALBUMIN 4.2 02/12/2019    AST 23 07/13/2021    ALT 12 07/13/2021    ALKPHOS 86 07/13/2021    BILITOT 0.6 07/13/2021       LIPID PANEL -   Lab Results   Component Value Date    CHOL 124 04/12/2019    TRIG 73 04/12/2019    HDL 61.1 04/12/2019    CHHDL 2.0 04/12/2019    LDLF 48 04/12/2019    VLDL 15 04/12/2019       RENAL FUNCTION PANEL -   Lab Results   Component Value Date    GLUCOSE 98 11/14/2023     11/14/2023    K 4.5 11/14/2023     11/14/2023    CO2 31 11/14/2023    ANIONGAP 10 11/14/2023    BUN 12 11/14/2023    CREATININE 0.96 11/14/2023    CALCIUM 9.1  11/14/2023    ALBUMIN 4.0 07/13/2021    ALBUMIN 4.2 02/12/2019        Lab Results   Component Value Date    HGBA1C 5.7 11/04/2018       Last Cardiology Tests:  ECG:from today showed sinus rhythm heart rate 67 bpm       Echo: 4/30/19  1. The left ventricular systolic function is normal with a 55-60% estimated ejection fraction.   2. There is mild mitral, tricuspid, and pulmonic regurgitation.   3. The left atrium is mildly dilated.    Outside hospital records obtained:  Echo from January 22, 2018:  Ejection fraction 62%  Normal left atrial size  No significant valvular abnormalities      Cath:  Cath 11/17/2023  1. Left main: no significant angiographic disease.   2. LAD: mild-moderate proximal calcification with 10-20% disease.   3. LCx: 30% mid-vessel tubular stenosis.   4. RCA: patent mid-vessel stent, no significant angiographic disease.   5. LVEDP 8mmHg, no aortic stenosis on LV-Ao gradient.    8/25/15  PCI mid RCA ([facility])       Stress Test:  Nuclear stress test 10/2023  1.  Moderate reversible defect along the inferior wall limited by  significant bowel artifact particularly on stress imaging. The  findings raise concern for possible mild ischemia, findings may be  artifactual in nature. Correlation with clinical suspicion is  recommended. If there is a high clinical suspicion, correlation with  an ammonia PET-CT may be of value.  2. The left ventricle is normal in size.  3. Normal LV wall motion with an LV EF estimated at greater than 65%.    Nuclear stress test from October 2, 2018:  No stress-induced ischemia  Ejection fraction 62%       Cardiac Imaging:      Assessment/Plan   Very pleasant 79 year old gentleman with paroxysmal Afib s/p ablation (6/2019), CAD s/p PCI mid RCA ([facility] ALIDA) 8/25/2015, and Lyme disease. He ,continues to do well from a cardiac standpoint. Continues to stay active. Heart rate and blood pressure is well controlled today.   Euvolemic on exam.     Plan   -Continue Metoprolol ER  50 mg once a day   -Continue Aspirin indefinitely   -Continue Atorvastatin and Xarelto   -call with any questions   -follow up in one year        Elaine Adair, APRN-CNP

## 2024-11-17 DIAGNOSIS — E78.5 HYPERLIPIDEMIA, UNSPECIFIED HYPERLIPIDEMIA TYPE: ICD-10-CM

## 2024-11-18 RX ORDER — ATORVASTATIN CALCIUM 40 MG/1
40 TABLET, FILM COATED ORAL NIGHTLY
Qty: 90 TABLET | Refills: 1 | Status: SHIPPED | OUTPATIENT
Start: 2024-11-18

## 2024-11-20 ENCOUNTER — APPOINTMENT (OUTPATIENT)
Dept: DERMATOLOGY | Facility: CLINIC | Age: 79
End: 2024-11-20
Payer: MEDICARE

## 2024-11-20 DIAGNOSIS — L57.9 SKIN CHANGES DUE TO CHRONIC EXPOSURE TO NONIONIZING RADIATION: ICD-10-CM

## 2024-11-20 DIAGNOSIS — D18.01 ANGIOMA OF SKIN: Primary | ICD-10-CM

## 2024-11-20 DIAGNOSIS — L82.1 SEBORRHEIC KERATOSIS: ICD-10-CM

## 2024-11-20 DIAGNOSIS — D22.9 BENIGN NEVUS: ICD-10-CM

## 2024-11-20 DIAGNOSIS — L81.4 LENTIGO: ICD-10-CM

## 2024-11-20 DIAGNOSIS — L57.0 ACTINIC KERATOSIS: ICD-10-CM

## 2024-11-20 PROCEDURE — 1159F MED LIST DOCD IN RCRD: CPT | Performed by: NURSE PRACTITIONER

## 2024-11-20 PROCEDURE — 1036F TOBACCO NON-USER: CPT | Performed by: NURSE PRACTITIONER

## 2024-11-20 PROCEDURE — 1157F ADVNC CARE PLAN IN RCRD: CPT | Performed by: NURSE PRACTITIONER

## 2024-11-20 PROCEDURE — 17003 DESTRUCT PREMALG LES 2-14: CPT | Performed by: NURSE PRACTITIONER

## 2024-11-20 PROCEDURE — 17000 DESTRUCT PREMALG LESION: CPT | Performed by: NURSE PRACTITIONER

## 2024-11-20 PROCEDURE — 99213 OFFICE O/P EST LOW 20 MIN: CPT | Performed by: NURSE PRACTITIONER

## 2024-11-20 PROCEDURE — 1160F RVW MEDS BY RX/DR IN RCRD: CPT | Performed by: NURSE PRACTITIONER

## 2024-11-20 NOTE — PROGRESS NOTES
Subjective     Naveen Arauz is a 79 y.o. male who presents for the following: Skin Check.     Review of Systems:  No other skin or systemic complaints other than what is documented elsewhere in the note.    The following portions of the chart were reviewed this encounter and updated as appropriate:   Tobacco  Allergies  Meds  Problems  Med Hx  Surg Hx         Skin Cancer History  No skin cancer on file.      Specialty Problems          Dermatology Problems    Actinic keratosis    Dermatitis, unspecified    Hemangioma of skin and subcutaneous tissue    Melanocytic nevi of trunk    Melanocytic nevi of unspecified lower limb, including hip    Melanocytic nevi of unspecified upper limb, including shoulder    Other hypertrophic disorders of the skin    Other melanin hyperpigmentation    Other seborrheic keratosis        Objective   Well appearing patient in no apparent distress; mood and affect are within normal limits.    A focused skin examination was performed neck up, chest, abdomen and back ( declined arms). All findings within normal limits unless otherwise noted below.    Assessment/Plan   1. Angioma of skin  Scattered cherry-red papule(s).    A cherry hemangioma is a small macule (small, flat, smooth area) or papule (small, solid bump) formed from an overgrowth of tiny blood vessels in the skin. Cherry hemangiomas are characteristically red or purplish in color. They often first appear in middle adulthood and usually increase in number with age. Cherry hemangiomas are noncancerous (benign) and are common in adults.    The present appearance of the lesion is not worrisome but it should continue to be observed and testing/treatment may be warranted if change occurs.    Related Procedures  Follow Up In Dermatology - Established Patient    2. Benign nevus  Scattered, uniform and benign-appearing, regular brown melanocytic papules and macules.    The present appearance of the lesion is not worrisome but it  should continue to be observed and testing/treatment may be warranted if change occurs.    Related Procedures  Follow Up In Dermatology - Established Patient    3. Seborrheic keratosis  Stuck on verrucous, tan-brown papules and plaques.      Seborrheic keratoses are common noncancerous (benign) growths of unknown cause seen in adults due to a thickening of an area of the top skin layer. Seborrheic keratoses may appear as if they are stuck on to the skin. They have distinct borders, and they may appear as papules (small, solid bumps) or plaques (solid, raised patches that are bigger than a thumbnail). They may be the same color as your skin, or they may be pink, light brown, darker brown, or very dark brown, or sometimes may appear black.    There is no way to prevent new seborrheic keratoses from forming. Seborrheic keratoses can be removed, but removal is considered a cosmetic issue and is usually not covered by insurance.    PLAN  No treatment is needed unless there is irritation from clothing, such as itching or bleeding.  2.   Some lotions containing alpha hydroxy acids, salicylic acid, or urea may make the areas feel smoother with regular use but will not eliminate them.    Related Procedures  Follow Up In Dermatology - Established Patient    4. Lentigo  Scattered tan macules in sun-exposed areas.    A solar lentigo (plural, solar lentigines), also known as a sun-induced freckle or senile lentigo, is a dark (hyperpigmented) lesion caused by natural or artificial ultraviolet (UV) light. Solar lentigines may be single or multiple. This type of lentigo is different from a simple lentigo (lentigo simplex) because it is caused by exposure to UV light. Solar lentigines are benign, but they do indicate excessive sun exposure, a risk factor for the development of skin cancer.    To prevent solar lentigines, avoid exposure to sunlight in midday (10 AM to 3 PM), wear sun-protective clothing (tightly woven clothes and  hats), and apply sunscreen (SPF 30 UVA and UVB block).    The present appearance of the lesion is not worrisome but it should continue to be observed and testing/treatment may be warranted if change occurs.    Related Procedures  Follow Up In Dermatology - Established Patient    5. Skin changes due to chronic exposure to nonionizing radiation  Actinic changes in the form of freckles, lentigines and hyper/hypopigmentation     ABCDEs of melanoma and atypical moles were discussed with the patient.    Patient was instructed to perform monthly self skin examination.  We recommended that the patient have regular full skin exams given an increased risk of subsequent skin cancers.    The patient was instructed to use sun protective behaviors including use of broad spectrum sunscreens and sun protective clothing to reduce risk of skin cancers.    Warning signs of non-melanoma skin cancer discussed.    Related Procedures  Follow Up In Dermatology - Established Patient    6. Actinic keratosis (9)  Left Anterior Neck (2), Left Buccal Cheek (2), Left Parotid Area, Right Anterior Neck, Right Buccal Cheek, Right Eyebrow, Right Temple  Thin erythematous papules with gritty scale    WHAT IS ACTINIC KERATOSIS?   - Actinic keratosis (AK) is a skin condition caused by sun damage. It causes scaly, rough, or bumpy spots on the skin.  - If left alone, AKs may turn into a skin cancer. People who burn easily or have trouble tanning are at more risk for developing AKs.   - There is no one test for AKs and diagnosis is made by clinical appearance. Treatment options include cryotherapy, therapy with lights, and various creams (e.g., topical 5-fluorocuracil, imiquimod).       To lower the chance of getting AK, you can:       ?  Stay out of the sun in the middle of the day (from 10 a.m. to 4 p.m.)       ?  Wear sunscreen - An SPF of at least 30 is best. The SPF number is on the sunscreen bottle or tube.       ?  Wear a wide-brimmed hat,  long-sleeved shirt, long pants, or long skirt outside. A baseball hat does not give much protection.        ?  Do not use tanning beds.        ?  Keep a low-fat diet, less than 21% of calories should come from fat       ?  Take Vitamin B3 (nicotinomide) 500mg twice daily.      YOUR TREATMENT PLAN  - At this time I recommend treatment with cryotherapy.  - Possible side effects of liquid nitrogen treatment reviewed including formation of blisters, crusting, tenderness, scar, and discoloration which may be permanent.  - Patient advised to return the office for re-evaluation if the treated lesion(s) do not resolve within 4-6 weeks. Patient verbalizes understanding.    Destr of lesion - Left Anterior Neck (2), Left Buccal Cheek (2), Left Parotid Area, Right Anterior Neck, Right Buccal Cheek, Right Eyebrow, Right Islam  Complexity: simple    Destruction method: cryotherapy    Informed consent: discussed and consent obtained    Timeout:  patient name, date of birth, surgical site, and procedure verified  Lesion destroyed using liquid nitrogen: Yes    Cryotherapy cycles:  2  Outcome: patient tolerated procedure well with no complications    Post-procedure details: wound care instructions given      Related Procedures  Follow Up In Dermatology - Established Patient        Return to clinic in 1 year for skin check/follow up or sooner if needed

## 2024-11-20 NOTE — PATIENT INSTRUCTIONS

## 2024-12-31 ENCOUNTER — HOSPITAL ENCOUNTER (EMERGENCY)
Facility: HOSPITAL | Age: 79
Discharge: HOME | End: 2024-12-31
Attending: EMERGENCY MEDICINE
Payer: MEDICARE

## 2024-12-31 ENCOUNTER — APPOINTMENT (OUTPATIENT)
Dept: RADIOLOGY | Facility: HOSPITAL | Age: 79
End: 2024-12-31
Payer: MEDICARE

## 2024-12-31 VITALS
OXYGEN SATURATION: 99 % | HEIGHT: 72 IN | SYSTOLIC BLOOD PRESSURE: 142 MMHG | DIASTOLIC BLOOD PRESSURE: 74 MMHG | HEART RATE: 70 BPM | WEIGHT: 170 LBS | RESPIRATION RATE: 16 BRPM | BODY MASS INDEX: 23.03 KG/M2 | TEMPERATURE: 97.5 F

## 2024-12-31 DIAGNOSIS — M10.9 ACUTE GOUT OF LEFT FOOT, UNSPECIFIED CAUSE: ICD-10-CM

## 2024-12-31 DIAGNOSIS — S90.32XA CONTUSION OF LEFT FOOT, INITIAL ENCOUNTER: ICD-10-CM

## 2024-12-31 DIAGNOSIS — S99.922A FOOT INJURY, LEFT, INITIAL ENCOUNTER: Primary | ICD-10-CM

## 2024-12-31 DIAGNOSIS — S93.602A SPRAIN OF LEFT FOOT, INITIAL ENCOUNTER: ICD-10-CM

## 2024-12-31 DIAGNOSIS — S90.32XA TRAUMATIC ECCHYMOSIS OF LEFT FOOT, INITIAL ENCOUNTER: ICD-10-CM

## 2024-12-31 PROCEDURE — 73630 X-RAY EXAM OF FOOT: CPT | Mod: LEFT SIDE | Performed by: RADIOLOGY

## 2024-12-31 PROCEDURE — 96372 THER/PROPH/DIAG INJ SC/IM: CPT | Performed by: EMERGENCY MEDICINE

## 2024-12-31 PROCEDURE — 73630 X-RAY EXAM OF FOOT: CPT | Mod: LT

## 2024-12-31 PROCEDURE — 2500000004 HC RX 250 GENERAL PHARMACY W/ HCPCS (ALT 636 FOR OP/ED): Performed by: EMERGENCY MEDICINE

## 2024-12-31 PROCEDURE — 99284 EMERGENCY DEPT VISIT MOD MDM: CPT | Performed by: EMERGENCY MEDICINE

## 2024-12-31 RX ORDER — METHYLPREDNISOLONE ACETATE 40 MG/ML
40 INJECTION, SUSPENSION INTRA-ARTICULAR; INTRALESIONAL; INTRAMUSCULAR; SOFT TISSUE ONCE
Status: COMPLETED | OUTPATIENT
Start: 2024-12-31 | End: 2024-12-31

## 2024-12-31 RX ADMIN — METHYLPREDNISOLONE ACETATE 40 MG: 40 INJECTION, SUSPENSION INTRA-ARTICULAR; INTRALESIONAL; INTRAMUSCULAR; INTRASYNOVIAL; SOFT TISSUE at 16:38

## 2024-12-31 ASSESSMENT — PAIN - FUNCTIONAL ASSESSMENT
PAIN_FUNCTIONAL_ASSESSMENT: 0-10
PAIN_FUNCTIONAL_ASSESSMENT: 0-10

## 2024-12-31 ASSESSMENT — COLUMBIA-SUICIDE SEVERITY RATING SCALE - C-SSRS
1. IN THE PAST MONTH, HAVE YOU WISHED YOU WERE DEAD OR WISHED YOU COULD GO TO SLEEP AND NOT WAKE UP?: NO
2. HAVE YOU ACTUALLY HAD ANY THOUGHTS OF KILLING YOURSELF?: NO
6. HAVE YOU EVER DONE ANYTHING, STARTED TO DO ANYTHING, OR PREPARED TO DO ANYTHING TO END YOUR LIFE?: NO

## 2024-12-31 ASSESSMENT — PAIN DESCRIPTION - ORIENTATION: ORIENTATION: LEFT

## 2024-12-31 ASSESSMENT — PAIN SCALES - GENERAL
PAINLEVEL_OUTOF10: 4
PAINLEVEL_OUTOF10: 5 - MODERATE PAIN

## 2024-12-31 ASSESSMENT — PAIN DESCRIPTION - LOCATION
LOCATION: FOOT
LOCATION: FOOT

## 2024-12-31 NOTE — ED PROVIDER NOTES
HPI   Chief Complaint   Patient presents with    Foot Injury     Pt dropped a piece of wood on top of L foot yesterday and now has pain, swelling and bruising to foot and toes       79-year-old male presents with left foot pain and bruising.  He states 10 days ago he dropped a piece of wood on his left foot.  He hit the left outside part of his foot.  He has had soreness to that area and has subsequently developed bruising through the toes of his left foot.  He has a remote prior history of gout and has colchicine at home.  He began developing left foot pain yesterday that was concerning for gout, so he took colchicine 1.2 mg followed by 0.6 mg 1 hour later yesterday.  There is been no improvement of the pain in the left foot since taking colchicine.      History provided by:  Patient and medical records          Patient History   Past Medical History:   Diagnosis Date    Arrhythmia     Coronary artery disease     Hyperlipidemia     Hypertension     Transient cerebral ischemic attack, unspecified     TIA (transient ischemic attack)     Past Surgical History:   Procedure Laterality Date    CARDIAC CATHETERIZATION N/A 11/17/2023    Procedure: Left Heart Cath;  Surgeon: Manuel Blackwell MD;  Location: [facility] Cardiac [facility];  Service: Cardiovascular;  Laterality: N/A;    MR HEAD ANGIO WO IV CONTRAST  11/4/2018    MR HEAD ANGIO WO IV CONTRAST 11/4/2018 [facility]    MR NECK ANGIO WO IV CONTRAST  11/4/2018    MR NECK ANGIO WO IV CONTRAST 11/4/2018 [facility]    OTHER SURGICAL HISTORY  11/19/2018    Stomach surgery    OTHER SURGICAL HISTORY  11/19/2018    Knee surgery    OTHER SURGICAL HISTORY  11/19/2018    Back surgery    OTHER SURGICAL HISTORY  11/19/2018    Cardiac catheterization with stent placement     Family History   Problem Relation Name Age of Onset    Heart attack Mother      Heart attack Father      Heart attack Other Aunt      Social History     Tobacco Use    Smoking status: Former      Current packs/day: 0.00     Types: Cigarettes     Quit date: 1973     Years since quittin.0    Smokeless tobacco: Never   Substance Use Topics    Alcohol use: Yes     Comment: Occasional    Drug use: Never       Physical Exam   ED Triage Vitals [24 1318]   Temperature Heart Rate Respirations BP   36.4 °C (97.6 °F) 72 17 158/77      SpO2 Temp Source Heart Rate Source Patient Position   97 % Temporal Monitor Sitting      BP Location FiO2 (%)     Left arm --       Physical Exam  Vitals and nursing note reviewed.   Constitutional:       General: He is not in acute distress.     Appearance: He is well-developed.   HENT:      Head: Normocephalic and atraumatic.   Eyes:      Conjunctiva/sclera: Conjunctivae normal.   Cardiovascular:      Rate and Rhythm: Normal rate and regular rhythm.      Heart sounds: No murmur heard.  Pulmonary:      Effort: Pulmonary effort is normal. No respiratory distress.      Breath sounds: Normal breath sounds.   Abdominal:      Palpations: Abdomen is soft.      Tenderness: There is no abdominal tenderness.   Musculoskeletal:         General: Swelling and tenderness present. No deformity.      Cervical back: Neck supple.        Feet:    Feet:      Comments: Tenderness to left fifth metatarsal.  Mild soft tissue swelling of left foot with bruising of toes of left foot.  2+ left DP and PT pulses.  Neurovascularly intact to left lower extremity.  Skin:     General: Skin is warm and dry.      Capillary Refill: Capillary refill takes less than 2 seconds.   Neurological:      Mental Status: He is alert.   Psychiatric:         Mood and Affect: Mood normal.           ED Course & MDM   ED Course as of 24 1608   Tue Dec 31, 2024   1438 79-year-old male presents with left foot injury.  X-ray left foot to evaluate for fracture.  He also has swelling and bruising to the left foot.  Left foot is diffusely tender and he may have a flareup of gout happening concurrently.  As he has a history of  remote prior gastrectomy secondary to bleeding ulcer I will avoid oral steroids for gout.  He was given Depo-Medrol 40 mg IM. [BT]   1607 X-ray negative for fracture left foot.    Most likely left foot sprain/contusion as well as superimposed mild gout.  Will avoid oral steroids or NSAIDs given prior gastrectomy and esophageal stricture.  Advised to continue icing and elevating left foot.  Podiatry follow-up as needed.  Return with any concerns. [BT]      ED Course User Index  [BT] Agustin Starks DO         Diagnoses as of 12/31/24 1608   Foot injury, left, initial encounter   Traumatic ecchymosis of left foot, initial encounter   Acute gout of left foot, unspecified cause   Contusion of left foot, initial encounter   Sprain of left foot, initial encounter     XR foot left 3+ views   Final Result   No evidence for acute injury.   Signed by Willie Fitzpatrick MD                      No data recorded                                 Medical Decision Making      Procedure  Procedures     Agustin Starks DO  12/31/24 1608

## 2025-01-05 ENCOUNTER — APPOINTMENT (OUTPATIENT)
Dept: CARDIOLOGY | Facility: HOSPITAL | Age: 80
End: 2025-01-05
Payer: MEDICARE

## 2025-01-05 ENCOUNTER — APPOINTMENT (OUTPATIENT)
Dept: RADIOLOGY | Facility: HOSPITAL | Age: 80
End: 2025-01-05
Payer: MEDICARE

## 2025-01-05 ENCOUNTER — APPOINTMENT (OUTPATIENT)
Dept: RADIOLOGY | Facility: HOSPITAL | Age: 80
DRG: 028 | End: 2025-01-05
Payer: MEDICARE

## 2025-01-05 ENCOUNTER — ANESTHESIA EVENT (OUTPATIENT)
Dept: OPERATING ROOM | Facility: HOSPITAL | Age: 80
End: 2025-01-05
Payer: MEDICARE

## 2025-01-05 ENCOUNTER — HOSPITAL ENCOUNTER (INPATIENT)
Facility: HOSPITAL | Age: 80
End: 2025-01-05
Attending: EMERGENCY MEDICINE | Admitting: SURGERY
Payer: MEDICARE

## 2025-01-05 ENCOUNTER — ANESTHESIA (OUTPATIENT)
Dept: OPERATING ROOM | Facility: HOSPITAL | Age: 80
End: 2025-01-05
Payer: MEDICARE

## 2025-01-05 ENCOUNTER — HOSPITAL ENCOUNTER (EMERGENCY)
Facility: HOSPITAL | Age: 80
Discharge: ANOTHER HOSPITAL | End: 2025-01-05
Attending: EMERGENCY MEDICINE
Payer: MEDICARE

## 2025-01-05 VITALS
RESPIRATION RATE: 18 BRPM | SYSTOLIC BLOOD PRESSURE: 138 MMHG | DIASTOLIC BLOOD PRESSURE: 79 MMHG | TEMPERATURE: 97.5 F | OXYGEN SATURATION: 94 % | BODY MASS INDEX: 24.19 KG/M2 | WEIGHT: 178.57 LBS | HEIGHT: 72 IN | HEART RATE: 69 BPM

## 2025-01-05 DIAGNOSIS — G89.18 POST-OPERATIVE PAIN: ICD-10-CM

## 2025-01-05 DIAGNOSIS — S32.9XXA FRACTURE OF UNSPECIFIED PARTS OF LUMBOSACRAL SPINE AND PELVIS, INITIAL ENCOUNTER FOR CLOSED FRACTURE (MULTI): ICD-10-CM

## 2025-01-05 DIAGNOSIS — S12.501A CLOSED NONDISPLACED FRACTURE OF SIXTH CERVICAL VERTEBRA, UNSPECIFIED FRACTURE MORPHOLOGY, INITIAL ENCOUNTER: ICD-10-CM
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
## 2025-01-05 DIAGNOSIS — S09.90XA CLOSED HEAD INJURY, INITIAL ENCOUNTER: ICD-10-CM

## 2025-01-05 DIAGNOSIS — J18.9 PNEUMONIA OF BOTH LOWER LOBES DUE TO INFECTIOUS ORGANISM: ICD-10-CM
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
## 2025-01-05 DIAGNOSIS — Z95.5 HISTORY OF CORONARY ARTERY STENT PLACEMENT: ICD-10-CM

## 2025-01-05 DIAGNOSIS — S06.4XAA EPIDURAL HEMATOMA (MULTI): Primary | ICD-10-CM

## 2025-01-05 DIAGNOSIS — B33.8 RSV (RESPIRATORY SYNCYTIAL VIRUS INFECTION): ICD-10-CM

## 2025-01-05 LAB
ABO GROUP (TYPE) IN BLOOD: NORMAL
ALBUMIN SERPL BCP-MCNC: 3.7 G/DL (ref 3.4–5)
ALBUMIN SERPL BCP-MCNC: 3.9 G/DL (ref 3.4–5)
ALBUMIN SERPL BCP-MCNC: 4.1 G/DL (ref 3.4–5)
ALP SERPL-CCNC: 129 U/L (ref 33–136)
ALP SERPL-CCNC: 138 U/L (ref 33–136)
ALT SERPL W P-5'-P-CCNC: 28 U/L (ref 10–52)
ALT SERPL W P-5'-P-CCNC: 30 U/L (ref 10–52)
ANION GAP BLDA CALCULATED.4IONS-SCNC: 10 MMO/L (ref 10–25)
ANION GAP BLDA CALCULATED.4IONS-SCNC: 11 MMO/L (ref 10–25)
ANION GAP SERPL CALC-SCNC: 14 MMOL/L (ref 10–20)
ANION GAP SERPL CALC-SCNC: 14 MMOL/L (ref 10–20)
ANION GAP SERPL CALC-SCNC: 16 MMOL/L (ref 10–20)
ANTIBODY SCREEN: NORMAL
ANTIBODY SCREEN: NORMAL
APTT PPP: 33 SECONDS (ref 27–38)
APTT PPP: 35 SECONDS (ref 27–38)
APTT PPP: 36 SECONDS (ref 27–38)
AST SERPL W P-5'-P-CCNC: 37 U/L (ref 9–39)
AST SERPL W P-5'-P-CCNC: 47 U/L (ref 9–39)
BASE EXCESS BLDA CALC-SCNC: 0.6 MMOL/L (ref -2–3)
BASE EXCESS BLDA CALC-SCNC: 1 MMOL/L (ref -2–3)
BASOPHILS # BLD AUTO: 0.07 X10*3/UL (ref 0–0.1)
BASOPHILS NFR BLD AUTO: 0.6 %
BILIRUB SERPL-MCNC: 0.8 MG/DL (ref 0–1.2)
BILIRUB SERPL-MCNC: 0.8 MG/DL (ref 0–1.2)
BNP SERPL-MCNC: 332 PG/ML (ref 0–99)
BODY TEMPERATURE: 37 DEGREES CELSIUS
BODY TEMPERATURE: 37 DEGREES CELSIUS
BUN SERPL-MCNC: 10 MG/DL (ref 6–23)
BUN SERPL-MCNC: 12 MG/DL (ref 6–23)
BUN SERPL-MCNC: 16 MG/DL (ref 6–23)
CA-I BLD-SCNC: 1.07 MMOL/L (ref 1.1–1.33)
CA-I BLDA-SCNC: 1.1 MMOL/L (ref 1.1–1.33)
CA-I BLDA-SCNC: 1.14 MMOL/L (ref 1.1–1.33)
CALCIUM SERPL-MCNC: 8.5 MG/DL (ref 8.6–10.6)
CALCIUM SERPL-MCNC: 8.7 MG/DL (ref 8.6–10.3)
CALCIUM SERPL-MCNC: 8.7 MG/DL (ref 8.6–10.6)
CARDIAC TROPONIN I PNL SERPL HS: 15 NG/L (ref 0–20)
CHLORIDE BLDA-SCNC: 96 MMOL/L (ref 98–107)
CHLORIDE BLDA-SCNC: 96 MMOL/L (ref 98–107)
CHLORIDE SERPL-SCNC: 94 MMOL/L (ref 98–107)
CHLORIDE SERPL-SCNC: 95 MMOL/L (ref 98–107)
CHLORIDE SERPL-SCNC: 95 MMOL/L (ref 98–107)
CO2 SERPL-SCNC: 26 MMOL/L (ref 21–32)
CO2 SERPL-SCNC: 27 MMOL/L (ref 21–32)
CO2 SERPL-SCNC: 27 MMOL/L (ref 21–32)
CREAT SERPL-MCNC: 0.78 MG/DL (ref 0.5–1.3)
CREAT SERPL-MCNC: 0.79 MG/DL (ref 0.5–1.3)
CREAT SERPL-MCNC: 0.82 MG/DL (ref 0.5–1.3)
EGFRCR SERPLBLD CKD-EPI 2021: 90 ML/MIN/1.73M*2
EGFRCR SERPLBLD CKD-EPI 2021: >90 ML/MIN/1.73M*2
EGFRCR SERPLBLD CKD-EPI 2021: >90 ML/MIN/1.73M*2
EOSINOPHIL # BLD AUTO: 0.2 X10*3/UL (ref 0–0.4)
EOSINOPHIL NFR BLD AUTO: 1.6 %
ERYTHROCYTE [DISTWIDTH] IN BLOOD BY AUTOMATED COUNT: 12.8 % (ref 11.5–14.5)
ERYTHROCYTE [DISTWIDTH] IN BLOOD BY AUTOMATED COUNT: 13.1 % (ref 11.5–14.5)
FLUAV RNA RESP QL NAA+PROBE: NOT DETECTED
FLUBV RNA RESP QL NAA+PROBE: NOT DETECTED
GLUCOSE BLD MANUAL STRIP-MCNC: 166 MG/DL (ref 74–99)
GLUCOSE BLDA-MCNC: 180 MG/DL (ref 74–99)
GLUCOSE BLDA-MCNC: 189 MG/DL (ref 74–99)
GLUCOSE SERPL-MCNC: 155 MG/DL (ref 74–99)
GLUCOSE SERPL-MCNC: 184 MG/DL (ref 74–99)
GLUCOSE SERPL-MCNC: 191 MG/DL (ref 74–99)
HCO3 BLDA-SCNC: 26 MMOL/L (ref 22–26)
HCO3 BLDA-SCNC: 27.4 MMOL/L (ref 22–26)
HCT VFR BLD AUTO: 36.4 % (ref 41–52)
HCT VFR BLD AUTO: 45.4 % (ref 41–52)
HCT VFR BLD EST: 38 % (ref 41–52)
HCT VFR BLD EST: 38 % (ref 41–52)
HGB BLD-MCNC: 12.3 G/DL (ref 13.5–17.5)
HGB BLD-MCNC: 15.3 G/DL (ref 13.5–17.5)
HGB BLDA-MCNC: 12.7 G/DL (ref 13.5–17.5)
HGB BLDA-MCNC: 12.8 G/DL (ref 13.5–17.5)
HOLD SPECIMEN: NORMAL
IMM GRANULOCYTES # BLD AUTO: 0.21 X10*3/UL (ref 0–0.5)
IMM GRANULOCYTES NFR BLD AUTO: 1.7 % (ref 0–0.9)
INHALED O2 CONCENTRATION: 30 %
INR PPP: 1.4 (ref 0.9–1.1)
INR PPP: 1.4 (ref 0.9–1.1)
INR PPP: 2.6 (ref 0.9–1.1)
LACTATE BLDA-SCNC: 1.3 MMOL/L (ref 0.4–2)
LACTATE BLDA-SCNC: 1.6 MMOL/L (ref 0.4–2)
LYMPHOCYTES # BLD AUTO: 2.49 X10*3/UL (ref 0.8–3)
LYMPHOCYTES NFR BLD AUTO: 19.7 %
MAGNESIUM SERPL-MCNC: 1.72 MG/DL (ref 1.6–2.4)
MAGNESIUM SERPL-MCNC: 2.3 MG/DL (ref 1.6–2.4)
MCH RBC QN AUTO: 33 PG (ref 26–34)
MCH RBC QN AUTO: 33.6 PG (ref 26–34)
MCHC RBC AUTO-ENTMCNC: 33.7 G/DL (ref 32–36)
MCHC RBC AUTO-ENTMCNC: 33.8 G/DL (ref 32–36)
MCV RBC AUTO: 100 FL (ref 80–100)
MCV RBC AUTO: 98 FL (ref 80–100)
MONOCYTES # BLD AUTO: 1.17 X10*3/UL (ref 0.05–0.8)
MONOCYTES NFR BLD AUTO: 9.2 %
NEUTROPHILS # BLD AUTO: 8.51 X10*3/UL (ref 1.6–5.5)
NEUTROPHILS NFR BLD AUTO: 67.2 %
NRBC BLD-RTO: 0 /100 WBCS (ref 0–0)
NRBC BLD-RTO: 0 /100 WBCS (ref 0–0)
OXYHGB MFR BLDA: 90.5 % (ref 94–98)
OXYHGB MFR BLDA: 98 % (ref 94–98)
PCO2 BLDA: 42 MM HG (ref 38–42)
PCO2 BLDA: 52 MM HG (ref 38–42)
PH BLDA: 7.33 PH (ref 7.38–7.42)
PH BLDA: 7.4 PH (ref 7.38–7.42)
PHOSPHATE SERPL-MCNC: 4.3 MG/DL (ref 2.5–4.9)
PLATELET # BLD AUTO: 131 X10*3/UL (ref 150–450)
PLATELET # BLD AUTO: 142 X10*3/UL (ref 150–450)
PO2 BLDA: 174 MM HG (ref 85–95)
PO2 BLDA: 65 MM HG (ref 85–95)
POTASSIUM BLDA-SCNC: 4.2 MMOL/L (ref 3.5–5.3)
POTASSIUM BLDA-SCNC: 4.2 MMOL/L (ref 3.5–5.3)
POTASSIUM SERPL-SCNC: 4.2 MMOL/L (ref 3.5–5.3)
PROT SERPL-MCNC: 6.8 G/DL (ref 6.4–8.2)
PROT SERPL-MCNC: 7.1 G/DL (ref 6.4–8.2)
PROTHROMBIN TIME: 15.8 SECONDS (ref 9.8–12.8)
PROTHROMBIN TIME: 16.2 SECONDS (ref 9.8–12.8)
PROTHROMBIN TIME: 29.9 SECONDS (ref 9.8–12.8)
RBC # BLD AUTO: 3.73 X10*6/UL (ref 4.5–5.9)
RBC # BLD AUTO: 4.56 X10*6/UL (ref 4.5–5.9)
RH FACTOR (ANTIGEN D): NORMAL
RSV RNA RESP QL NAA+PROBE: DETECTED
SAO2 % BLDA: 100 % (ref 94–100)
SAO2 % BLDA: 93 % (ref 94–100)
SARS-COV-2 RNA RESP QL NAA+PROBE: NOT DETECTED
SODIUM BLDA-SCNC: 129 MMOL/L (ref 136–145)
SODIUM BLDA-SCNC: 129 MMOL/L (ref 136–145)
SODIUM SERPL-SCNC: 131 MMOL/L (ref 136–145)
SODIUM SERPL-SCNC: 132 MMOL/L (ref 136–145)
SODIUM SERPL-SCNC: 133 MMOL/L (ref 136–145)
WBC # BLD AUTO: 12.7 X10*3/UL (ref 4.4–11.3)
WBC # BLD AUTO: 12.9 X10*3/UL (ref 4.4–11.3)

## 2025-01-05 PROCEDURE — 80053 COMPREHEN METABOLIC PANEL: CPT | Performed by: EMERGENCY MEDICINE

## 2025-01-05 PROCEDURE — 99291 CRITICAL CARE FIRST HOUR: CPT | Performed by: PHYSICIAN ASSISTANT

## 2025-01-05 PROCEDURE — 2500000004 HC RX 250 GENERAL PHARMACY W/ HCPCS (ALT 636 FOR OP/ED)

## 2025-01-05 PROCEDURE — 3600000009 HC OR TIME - EACH INCREMENTAL 1 MINUTE - PROCEDURE LEVEL FOUR: Performed by: NEUROLOGICAL SURGERY

## 2025-01-05 PROCEDURE — 99285 EMERGENCY DEPT VISIT HI MDM: CPT | Performed by: EMERGENCY MEDICINE

## 2025-01-05 PROCEDURE — 84132 ASSAY OF SERUM POTASSIUM: CPT

## 2025-01-05 PROCEDURE — 71275 CT ANGIOGRAPHY CHEST: CPT

## 2025-01-05 PROCEDURE — 70450 CT HEAD/BRAIN W/O DYE: CPT | Performed by: STUDENT IN AN ORGANIZED HEALTH CARE EDUCATION/TRAINING PROGRAM

## 2025-01-05 PROCEDURE — 74177 CT ABD & PELVIS W/CONTRAST: CPT

## 2025-01-05 PROCEDURE — 2550000001 HC RX 255 CONTRASTS: Performed by: EMERGENCY MEDICINE

## 2025-01-05 PROCEDURE — 36415 COLL VENOUS BLD VENIPUNCTURE: CPT | Performed by: EMERGENCY MEDICINE

## 2025-01-05 PROCEDURE — 2500000004 HC RX 250 GENERAL PHARMACY W/ HCPCS (ALT 636 FOR OP/ED): Performed by: NURSE ANESTHETIST, CERTIFIED REGISTERED

## 2025-01-05 PROCEDURE — 70547 MR ANGIOGRAPHY NECK W/O DYE: CPT

## 2025-01-05 PROCEDURE — 83735 ASSAY OF MAGNESIUM: CPT | Performed by: EMERGENCY MEDICINE

## 2025-01-05 PROCEDURE — P9045 ALBUMIN (HUMAN), 5%, 250 ML: HCPCS | Mod: JZ,JG,TB

## 2025-01-05 PROCEDURE — 70450 CT HEAD/BRAIN W/O DYE: CPT

## 2025-01-05 PROCEDURE — 82330 ASSAY OF CALCIUM: CPT

## 2025-01-05 PROCEDURE — 37799 UNLISTED PX VASCULAR SURGERY: CPT

## 2025-01-05 PROCEDURE — 84484 ASSAY OF TROPONIN QUANT: CPT | Performed by: EMERGENCY MEDICINE

## 2025-01-05 PROCEDURE — 2720000007 HC OR 272 NO HCPCS: Performed by: NEUROLOGICAL SURGERY

## 2025-01-05 PROCEDURE — 85610 PROTHROMBIN TIME: CPT

## 2025-01-05 PROCEDURE — 72128 CT CHEST SPINE W/O DYE: CPT | Mod: RCN

## 2025-01-05 PROCEDURE — 83735 ASSAY OF MAGNESIUM: CPT

## 2025-01-05 PROCEDURE — 70547 MR ANGIOGRAPHY NECK W/O DYE: CPT | Performed by: RADIOLOGY

## 2025-01-05 PROCEDURE — 85730 THROMBOPLASTIN TIME PARTIAL: CPT | Performed by: EMERGENCY MEDICINE

## 2025-01-05 PROCEDURE — 63265 EXCISE INTRASPINL LESION CRV: CPT | Performed by: NEUROLOGICAL SURGERY

## 2025-01-05 PROCEDURE — 99285 EMERGENCY DEPT VISIT HI MDM: CPT | Mod: 25 | Performed by: EMERGENCY MEDICINE

## 2025-01-05 PROCEDURE — 85610 PROTHROMBIN TIME: CPT | Performed by: EMERGENCY MEDICINE

## 2025-01-05 PROCEDURE — 2500000005 HC RX 250 GENERAL PHARMACY W/O HCPCS: Performed by: EMERGENCY MEDICINE

## 2025-01-05 PROCEDURE — 36620 INSERTION CATHETER ARTERY: CPT | Performed by: NURSE ANESTHETIST, CERTIFIED REGISTERED

## 2025-01-05 PROCEDURE — 82947 ASSAY GLUCOSE BLOOD QUANT: CPT

## 2025-01-05 PROCEDURE — 99291 CRITICAL CARE FIRST HOUR: CPT | Performed by: EMERGENCY MEDICINE

## 2025-01-05 PROCEDURE — 96365 THER/PROPH/DIAG IV INF INIT: CPT | Mod: 59

## 2025-01-05 PROCEDURE — 96375 TX/PRO/DX INJ NEW DRUG ADDON: CPT | Mod: 59

## 2025-01-05 PROCEDURE — 96374 THER/PROPH/DIAG INJ IV PUSH: CPT

## 2025-01-05 PROCEDURE — 2780000003 HC OR 278 NO HCPCS: Performed by: NEUROLOGICAL SURGERY

## 2025-01-05 PROCEDURE — 72146 MRI CHEST SPINE W/O DYE: CPT

## 2025-01-05 PROCEDURE — 72125 CT NECK SPINE W/O DYE: CPT | Performed by: STUDENT IN AN ORGANIZED HEALTH CARE EDUCATION/TRAINING PROGRAM

## 2025-01-05 PROCEDURE — 3600000004 HC OR TIME - INITIAL BASE CHARGE - PROCEDURE LEVEL FOUR: Performed by: NEUROLOGICAL SURGERY

## 2025-01-05 PROCEDURE — 72125 CT NECK SPINE W/O DYE: CPT

## 2025-01-05 PROCEDURE — 86901 BLOOD TYPING SEROLOGIC RH(D): CPT

## 2025-01-05 PROCEDURE — 99100 ANES PT EXTEME AGE<1 YR&>70: CPT | Performed by: ANESTHESIOLOGY

## 2025-01-05 PROCEDURE — 96367 TX/PROPH/DG ADDL SEQ IV INF: CPT

## 2025-01-05 PROCEDURE — 2500000004 HC RX 250 GENERAL PHARMACY W/ HCPCS (ALT 636 FOR OP/ED): Mod: JZ,TB

## 2025-01-05 PROCEDURE — 72131 CT LUMBAR SPINE W/O DYE: CPT | Mod: RCN

## 2025-01-05 PROCEDURE — 2500000004 HC RX 250 GENERAL PHARMACY W/ HCPCS (ALT 636 FOR OP/ED): Performed by: NEUROLOGICAL SURGERY

## 2025-01-05 PROCEDURE — 6360000002 HC RX 636 FACTOR: Mod: JZ,TB | Performed by: EMERGENCY MEDICINE

## 2025-01-05 PROCEDURE — 87637 SARSCOV2&INF A&B&RSV AMP PRB: CPT | Performed by: EMERGENCY MEDICINE

## 2025-01-05 PROCEDURE — A63015 PR LAMINECTOMY,>2 SGMT,CERVICAL: Performed by: ANESTHESIOLOGY

## 2025-01-05 PROCEDURE — 72141 MRI NECK SPINE W/O DYE: CPT

## 2025-01-05 PROCEDURE — 86901 BLOOD TYPING SEROLOGIC RH(D): CPT | Performed by: EMERGENCY MEDICINE

## 2025-01-05 PROCEDURE — 87040 BLOOD CULTURE FOR BACTERIA: CPT | Mod: CONLAB | Performed by: EMERGENCY MEDICINE

## 2025-01-05 PROCEDURE — G0390 TRAUMA RESPONS W/HOSP CRITI: HCPCS

## 2025-01-05 PROCEDURE — 96376 TX/PRO/DX INJ SAME DRUG ADON: CPT | Mod: 59

## 2025-01-05 PROCEDURE — 99140 ANES COMP EMERGENCY COND: CPT | Performed by: ANESTHESIOLOGY

## 2025-01-05 PROCEDURE — 85027 COMPLETE CBC AUTOMATED: CPT

## 2025-01-05 PROCEDURE — 93005 ELECTROCARDIOGRAM TRACING: CPT

## 2025-01-05 PROCEDURE — 2020000001 HC ICU ROOM DAILY

## 2025-01-05 PROCEDURE — 2500000005 HC RX 250 GENERAL PHARMACY W/O HCPCS: Performed by: NEUROLOGICAL SURGERY

## 2025-01-05 PROCEDURE — 3700000001 HC GENERAL ANESTHESIA TIME - INITIAL BASE CHARGE: Performed by: NEUROLOGICAL SURGERY

## 2025-01-05 PROCEDURE — 2500000004 HC RX 250 GENERAL PHARMACY W/ HCPCS (ALT 636 FOR OP/ED): Performed by: EMERGENCY MEDICINE

## 2025-01-05 PROCEDURE — 00NW0ZZ RELEASE CERVICAL SPINAL CORD, OPEN APPROACH: ICD-10-PCS | Performed by: NEUROLOGICAL SURGERY

## 2025-01-05 PROCEDURE — 3700000002 HC GENERAL ANESTHESIA TIME - EACH INCREMENTAL 1 MINUTE: Performed by: NEUROLOGICAL SURGERY

## 2025-01-05 PROCEDURE — 83880 ASSAY OF NATRIURETIC PEPTIDE: CPT | Performed by: EMERGENCY MEDICINE

## 2025-01-05 PROCEDURE — 85025 COMPLETE CBC W/AUTO DIFF WBC: CPT | Performed by: EMERGENCY MEDICINE

## 2025-01-05 PROCEDURE — 93010 ELECTROCARDIOGRAM REPORT: CPT | Performed by: EMERGENCY MEDICINE

## 2025-01-05 RX ORDER — IPRATROPIUM BROMIDE AND ALBUTEROL SULFATE 2.5; .5 MG/3ML; MG/3ML
3 SOLUTION RESPIRATORY (INHALATION)
Status: DISCONTINUED | OUTPATIENT
Start: 2025-01-05 | End: 2025-01-05

## 2025-01-05 RX ORDER — MORPHINE SULFATE 4 MG/ML
4 INJECTION INTRAVENOUS ONCE
Status: COMPLETED | OUTPATIENT
Start: 2025-01-05 | End: 2025-01-05

## 2025-01-05 RX ORDER — PHENYLEPHRINE HYDROCHLORIDE 10 MG/ML
INJECTION INTRAVENOUS AS NEEDED
Status: DISCONTINUED | OUTPATIENT
Start: 2025-01-05 | End: 2025-01-05

## 2025-01-05 RX ORDER — ESMOLOL HYDROCHLORIDE 10 MG/ML
INJECTION INTRAVENOUS AS NEEDED
Status: DISCONTINUED | OUTPATIENT
Start: 2025-01-05 | End: 2025-01-05

## 2025-01-05 RX ORDER — ALBUTEROL SULFATE 90 UG/1
2 INHALANT RESPIRATORY (INHALATION) DAILY PRN
COMMUNITY
Start: 2025-01-03 | End: 2025-01-24 | Stop reason: WASHOUT

## 2025-01-05 RX ORDER — HYDROMORPHONE HYDROCHLORIDE 0.2 MG/ML
0.2 INJECTION INTRAMUSCULAR; INTRAVENOUS; SUBCUTANEOUS
Status: DISCONTINUED | OUTPATIENT
Start: 2025-01-05 | End: 2025-01-06

## 2025-01-05 RX ORDER — SODIUM CHLORIDE, SODIUM LACTATE, POTASSIUM CHLORIDE, CALCIUM CHLORIDE 600; 310; 30; 20 MG/100ML; MG/100ML; MG/100ML; MG/100ML
INJECTION, SOLUTION INTRAVENOUS CONTINUOUS PRN
Status: DISCONTINUED | OUTPATIENT
Start: 2025-01-05 | End: 2025-01-05

## 2025-01-05 RX ORDER — ROCURONIUM BROMIDE 10 MG/ML
INJECTION, SOLUTION INTRAVENOUS AS NEEDED
Status: DISCONTINUED | OUTPATIENT
Start: 2025-01-05 | End: 2025-01-05

## 2025-01-05 RX ORDER — CALCIUM GLUCONATE 20 MG/ML
1 INJECTION, SOLUTION INTRAVENOUS EVERY 4 HOURS
Status: COMPLETED | OUTPATIENT
Start: 2025-01-05 | End: 2025-01-06

## 2025-01-05 RX ORDER — BACITRACIN 500 [USP'U]/G
OINTMENT TOPICAL AS NEEDED
Status: DISCONTINUED | OUTPATIENT
Start: 2025-01-05 | End: 2025-01-05 | Stop reason: HOSPADM

## 2025-01-05 RX ORDER — HYDROMORPHONE HYDROCHLORIDE 1 MG/ML
INJECTION, SOLUTION INTRAMUSCULAR; INTRAVENOUS; SUBCUTANEOUS AS NEEDED
Status: DISCONTINUED | OUTPATIENT
Start: 2025-01-05 | End: 2025-01-05

## 2025-01-05 RX ORDER — MIDAZOLAM HYDROCHLORIDE 1 MG/ML
INJECTION INTRAMUSCULAR; INTRAVENOUS CONTINUOUS PRN
Status: DISCONTINUED | OUTPATIENT
Start: 2025-01-05 | End: 2025-01-05

## 2025-01-05 RX ORDER — HYDROMORPHONE HYDROCHLORIDE 0.2 MG/ML
0.2 INJECTION INTRAMUSCULAR; INTRAVENOUS; SUBCUTANEOUS
Status: CANCELLED | OUTPATIENT
Start: 2025-01-05

## 2025-01-05 RX ORDER — METOPROLOL SUCCINATE 100 MG/1
0.5 TABLET, EXTENDED RELEASE ORAL DAILY
COMMUNITY

## 2025-01-05 RX ORDER — DEXTROSE 50 % IN WATER (D50W) INTRAVENOUS SYRINGE
25
Status: DISCONTINUED | OUTPATIENT
Start: 2025-01-05 | End: 2025-01-12 | Stop reason: HOSPADM

## 2025-01-05 RX ORDER — ONDANSETRON HYDROCHLORIDE 2 MG/ML
4 INJECTION, SOLUTION INTRAVENOUS ONCE
Status: COMPLETED | OUTPATIENT
Start: 2025-01-05 | End: 2025-01-05

## 2025-01-05 RX ORDER — MORPHINE SULFATE 4 MG/ML
INJECTION INTRAVENOUS
Status: COMPLETED
Start: 2025-01-05 | End: 2025-01-05

## 2025-01-05 RX ORDER — MORPHINE SULFATE 4 MG/ML
4 INJECTION INTRAVENOUS EVERY 4 HOURS PRN
Status: DISCONTINUED | OUTPATIENT
Start: 2025-01-05 | End: 2025-01-06

## 2025-01-05 RX ORDER — ONDANSETRON HYDROCHLORIDE 2 MG/ML
INJECTION, SOLUTION INTRAVENOUS AS NEEDED
Status: DISCONTINUED | OUTPATIENT
Start: 2025-01-05 | End: 2025-01-05

## 2025-01-05 RX ORDER — CEFTRIAXONE 1 G/50ML
1 INJECTION, SOLUTION INTRAVENOUS ONCE
Status: DISCONTINUED | OUTPATIENT
Start: 2025-01-05 | End: 2025-01-05 | Stop reason: SDUPTHER

## 2025-01-05 RX ORDER — ACETAMINOPHEN 10 MG/ML
1000 INJECTION, SOLUTION INTRAVENOUS EVERY 6 HOURS SCHEDULED
Status: DISPENSED | OUTPATIENT
Start: 2025-01-05 | End: 2025-01-06

## 2025-01-05 RX ORDER — LIDOCAINE HYDROCHLORIDE 20 MG/ML
INJECTION, SOLUTION INFILTRATION; PERINEURAL AS NEEDED
Status: DISCONTINUED | OUTPATIENT
Start: 2025-01-05 | End: 2025-01-05

## 2025-01-05 RX ORDER — INSULIN LISPRO 100 [IU]/ML
0-5 INJECTION, SOLUTION INTRAVENOUS; SUBCUTANEOUS
Status: DISCONTINUED | OUTPATIENT
Start: 2025-01-06 | End: 2025-01-12 | Stop reason: HOSPADM

## 2025-01-05 RX ORDER — PROPOFOL 10 MG/ML
INJECTION, EMULSION INTRAVENOUS AS NEEDED
Status: DISCONTINUED | OUTPATIENT
Start: 2025-01-05 | End: 2025-01-05

## 2025-01-05 RX ORDER — DEXTROSE 50 % IN WATER (D50W) INTRAVENOUS SYRINGE
12.5
Status: DISCONTINUED | OUTPATIENT
Start: 2025-01-05 | End: 2025-01-12 | Stop reason: HOSPADM

## 2025-01-05 RX ORDER — IPRATROPIUM BROMIDE AND ALBUTEROL SULFATE 2.5; .5 MG/3ML; MG/3ML
3 SOLUTION RESPIRATORY (INHALATION) EVERY 4 HOURS PRN
Status: DISCONTINUED | OUTPATIENT
Start: 2025-01-05 | End: 2025-01-12 | Stop reason: HOSPADM

## 2025-01-05 RX ORDER — FENTANYL CITRATE 50 UG/ML
INJECTION, SOLUTION INTRAMUSCULAR; INTRAVENOUS AS NEEDED
Status: DISCONTINUED | OUTPATIENT
Start: 2025-01-05 | End: 2025-01-05

## 2025-01-05 RX ORDER — POLYMYXIN B 500000 [USP'U]/1
INJECTION, POWDER, LYOPHILIZED, FOR SOLUTION INTRAMUSCULAR; INTRATHECAL; INTRAVENOUS; OPHTHALMIC AS NEEDED
Status: DISCONTINUED | OUTPATIENT
Start: 2025-01-05 | End: 2025-01-05 | Stop reason: HOSPADM

## 2025-01-05 RX ORDER — HYDROMORPHONE HYDROCHLORIDE 0.2 MG/ML
0.2 INJECTION INTRAMUSCULAR; INTRAVENOUS; SUBCUTANEOUS ONCE
Status: COMPLETED | OUTPATIENT
Start: 2025-01-05 | End: 2025-01-05

## 2025-01-05 RX ORDER — CEFAZOLIN 1 G/1
INJECTION, POWDER, FOR SOLUTION INTRAVENOUS AS NEEDED
Status: DISCONTINUED | OUTPATIENT
Start: 2025-01-05 | End: 2025-01-05

## 2025-01-05 RX ORDER — GUAIFENESIN 600 MG/1
600 TABLET, EXTENDED RELEASE ORAL 2 TIMES DAILY PRN
COMMUNITY
Start: 2025-01-03 | End: 2025-02-27 | Stop reason: WASHOUT

## 2025-01-05 RX ORDER — AZITHROMYCIN 250 MG/1
TABLET, FILM COATED ORAL
COMMUNITY
Start: 2025-01-03 | End: 2025-01-12 | Stop reason: HOSPADM

## 2025-01-05 RX ORDER — HYDROMORPHONE HYDROCHLORIDE 1 MG/ML
1 INJECTION, SOLUTION INTRAMUSCULAR; INTRAVENOUS; SUBCUTANEOUS ONCE
Status: COMPLETED | OUTPATIENT
Start: 2025-01-05 | End: 2025-01-05

## 2025-01-05 RX ORDER — CEFTRIAXONE 1 G/50ML
1 INJECTION, SOLUTION INTRAVENOUS EVERY 12 HOURS
Status: DISCONTINUED | OUTPATIENT
Start: 2025-01-05 | End: 2025-01-06

## 2025-01-05 RX ORDER — AZITHROMYCIN MONOHYDRATE 500 MG/5ML
INJECTION, POWDER, LYOPHILIZED, FOR SOLUTION INTRAVENOUS
Status: COMPLETED
Start: 2025-01-05 | End: 2025-01-05

## 2025-01-05 RX ORDER — PANTOPRAZOLE SODIUM 40 MG/10ML
40 INJECTION, POWDER, LYOPHILIZED, FOR SOLUTION INTRAVENOUS
Status: DISCONTINUED | OUTPATIENT
Start: 2025-01-06 | End: 2025-01-06

## 2025-01-05 RX ORDER — SODIUM CHLORIDE 9 MG/ML
75 INJECTION, SOLUTION INTRAVENOUS CONTINUOUS
Status: ACTIVE | OUTPATIENT
Start: 2025-01-05 | End: 2025-01-06

## 2025-01-05 RX ORDER — ALBUMIN HUMAN 50 G/1000ML
SOLUTION INTRAVENOUS AS NEEDED
Status: DISCONTINUED | OUTPATIENT
Start: 2025-01-05 | End: 2025-01-05

## 2025-01-05 RX ORDER — FUROSEMIDE 10 MG/ML
INJECTION INTRAMUSCULAR; INTRAVENOUS
Status: COMPLETED
Start: 2025-01-05 | End: 2025-01-05

## 2025-01-05 RX ORDER — CEFTRIAXONE 2 G/50ML
2 INJECTION, SOLUTION INTRAVENOUS EVERY 24 HOURS
Status: DISCONTINUED | OUTPATIENT
Start: 2025-01-05 | End: 2025-01-05 | Stop reason: HOSPADM

## 2025-01-05 RX ORDER — MAGNESIUM SULFATE HEPTAHYDRATE 40 MG/ML
INJECTION, SOLUTION INTRAVENOUS AS NEEDED
Status: DISCONTINUED | OUTPATIENT
Start: 2025-01-05 | End: 2025-01-05

## 2025-01-05 RX ORDER — ONDANSETRON HYDROCHLORIDE 2 MG/ML
INJECTION, SOLUTION INTRAVENOUS
Status: COMPLETED
Start: 2025-01-05 | End: 2025-01-05

## 2025-01-05 RX ORDER — HYDROMORPHONE HYDROCHLORIDE 1 MG/ML
0.5 INJECTION, SOLUTION INTRAMUSCULAR; INTRAVENOUS; SUBCUTANEOUS ONCE
Status: COMPLETED | OUTPATIENT
Start: 2025-01-05 | End: 2025-01-05

## 2025-01-05 RX ORDER — FUROSEMIDE 10 MG/ML
20 INJECTION INTRAMUSCULAR; INTRAVENOUS ONCE
Status: COMPLETED | OUTPATIENT
Start: 2025-01-05 | End: 2025-01-05

## 2025-01-05 RX ADMIN — PHENYLEPHRINE HYDROCHLORIDE 80 MCG: 10 INJECTION INTRAVENOUS at 14:52

## 2025-01-05 RX ADMIN — SUGAMMADEX 400 MG: 100 INJECTION, SOLUTION INTRAVENOUS at 17:02

## 2025-01-05 RX ADMIN — PROPOFOL 20 MG: 10 INJECTION, EMULSION INTRAVENOUS at 16:50

## 2025-01-05 RX ADMIN — MORPHINE SULFATE 4 MG: 4 INJECTION INTRAVENOUS at 04:37

## 2025-01-05 RX ADMIN — CEFTRIAXONE SODIUM 1 G: 1 INJECTION, SOLUTION INTRAVENOUS at 23:50

## 2025-01-05 RX ADMIN — ALBUMIN HUMAN 250 ML: 0.05 INJECTION, SOLUTION INTRAVENOUS at 15:20

## 2025-01-05 RX ADMIN — CEFAZOLIN 2 G: 1 INJECTION, POWDER, FOR SOLUTION INTRAMUSCULAR; INTRAVENOUS at 15:19

## 2025-01-05 RX ADMIN — MORPHINE SULFATE 4 MG: 4 INJECTION INTRAVENOUS at 02:53

## 2025-01-05 RX ADMIN — CALCIUM GLUCONATE 1 G: 20 INJECTION, SOLUTION INTRAVENOUS at 23:48

## 2025-01-05 RX ADMIN — Medication 4050 UNITS: at 05:17

## 2025-01-05 RX ADMIN — ROCURONIUM 100 MG: 50 INJECTION, SOLUTION INTRAVENOUS at 14:41

## 2025-01-05 RX ADMIN — SODIUM CHLORIDE, POTASSIUM CHLORIDE, SODIUM LACTATE AND CALCIUM CHLORIDE: 600; 310; 30; 20 INJECTION, SOLUTION INTRAVENOUS at 14:35

## 2025-01-05 RX ADMIN — PROPOFOL 150 MG: 10 INJECTION, EMULSION INTRAVENOUS at 14:41

## 2025-01-05 RX ADMIN — MORPHINE SULFATE 4 MG: 4 INJECTION INTRAVENOUS at 02:16

## 2025-01-05 RX ADMIN — ALBUMIN HUMAN 250 ML: 0.05 INJECTION, SOLUTION INTRAVENOUS at 15:34

## 2025-01-05 RX ADMIN — MORPHINE SULFATE 4 MG: 4 INJECTION, SOLUTION INTRAMUSCULAR; INTRAVENOUS at 04:37

## 2025-01-05 RX ADMIN — LIDOCAINE HYDROCHLORIDE 80 MG: 20 INJECTION, SOLUTION INFILTRATION; PERINEURAL at 14:41

## 2025-01-05 RX ADMIN — Medication 5 L/MIN: at 01:58

## 2025-01-05 RX ADMIN — SODIUM CHLORIDE 75 ML/HR: 9 INJECTION, SOLUTION INTRAVENOUS at 18:12

## 2025-01-05 RX ADMIN — FENTANYL CITRATE 50 MCG: 50 INJECTION, SOLUTION INTRAMUSCULAR; INTRAVENOUS at 14:41

## 2025-01-05 RX ADMIN — HYDROMORPHONE HYDROCHLORIDE 1 MG: 1 INJECTION, SOLUTION INTRAMUSCULAR; INTRAVENOUS; SUBCUTANEOUS at 07:15

## 2025-01-05 RX ADMIN — ONDANSETRON HYDROCHLORIDE 4 MG: 2 INJECTION, SOLUTION INTRAVENOUS at 02:16

## 2025-01-05 RX ADMIN — FENTANYL CITRATE 50 MCG: 50 INJECTION, SOLUTION INTRAMUSCULAR; INTRAVENOUS at 15:37

## 2025-01-05 RX ADMIN — HYDROMORPHONE HYDROCHLORIDE 0.2 MG: 1 INJECTION, SOLUTION INTRAMUSCULAR; INTRAVENOUS; SUBCUTANEOUS at 17:13

## 2025-01-05 RX ADMIN — MAGNESIUM SULFATE HEPTAHYDRATE 2 G: 2 INJECTION, SOLUTION INTRAVENOUS at 15:27

## 2025-01-05 RX ADMIN — PHENYLEPHRINE HYDROCHLORIDE 120 MCG: 10 INJECTION INTRAVENOUS at 14:42

## 2025-01-05 RX ADMIN — DEXAMETHASONE SODIUM PHOSPHATE 10 MG: 4 INJECTION INTRA-ARTICULAR; INTRALESIONAL; INTRAMUSCULAR; INTRAVENOUS; SOFT TISSUE at 15:20

## 2025-01-05 RX ADMIN — PHENYLEPHRINE HYDROCHLORIDE 80 MCG: 10 INJECTION INTRAVENOUS at 15:20

## 2025-01-05 RX ADMIN — ROCURONIUM 10 MG: 50 INJECTION, SOLUTION INTRAVENOUS at 16:17

## 2025-01-05 RX ADMIN — HYDROMORPHONE HYDROCHLORIDE 0.5 MG: 1 INJECTION, SOLUTION INTRAMUSCULAR; INTRAVENOUS; SUBCUTANEOUS at 11:03

## 2025-01-05 RX ADMIN — CALCIUM GLUCONATE 1 G: 20 INJECTION, SOLUTION INTRAVENOUS at 19:33

## 2025-01-05 RX ADMIN — ESMOLOL HYDROCHLORIDE 30 MG: 10 INJECTION, SOLUTION INTRAVENOUS at 14:54

## 2025-01-05 RX ADMIN — IOHEXOL 75 ML: 350 INJECTION, SOLUTION INTRAVENOUS at 03:20

## 2025-01-05 RX ADMIN — CEFTRIAXONE 2 G: 2 INJECTION, SOLUTION INTRAVENOUS at 04:36

## 2025-01-05 RX ADMIN — MORPHINE SULFATE 4 MG: 4 INJECTION INTRAVENOUS at 20:00

## 2025-01-05 RX ADMIN — PHENYLEPHRINE HYDROCHLORIDE 80 MCG: 10 INJECTION INTRAVENOUS at 16:14

## 2025-01-05 RX ADMIN — FUROSEMIDE 20 MG: 10 INJECTION, SOLUTION INTRAMUSCULAR; INTRAVENOUS at 02:17

## 2025-01-05 RX ADMIN — PROPOFOL 50 MG: 10 INJECTION, EMULSION INTRAVENOUS at 14:54

## 2025-01-05 RX ADMIN — ONDANSETRON 4 MG: 2 INJECTION, SOLUTION INTRAMUSCULAR; INTRAVENOUS at 16:39

## 2025-01-05 RX ADMIN — FUROSEMIDE 20 MG: 10 INJECTION INTRAMUSCULAR; INTRAVENOUS at 02:17

## 2025-01-05 RX ADMIN — AZITHROMYCIN MONOHYDRATE 500 MG: 500 INJECTION, POWDER, LYOPHILIZED, FOR SOLUTION INTRAVENOUS at 04:35

## 2025-01-05 RX ADMIN — MORPHINE SULFATE 4 MG: 4 INJECTION, SOLUTION INTRAMUSCULAR; INTRAVENOUS at 02:16

## 2025-01-05 RX ADMIN — HYDROMORPHONE HYDROCHLORIDE 0.2 MG: 0.2 INJECTION, SOLUTION INTRAMUSCULAR; INTRAVENOUS; SUBCUTANEOUS at 18:11

## 2025-01-05 RX ADMIN — HYDROMORPHONE HYDROCHLORIDE 0.2 MG: 0.2 INJECTION, SOLUTION INTRAMUSCULAR; INTRAVENOUS; SUBCUTANEOUS at 23:56

## 2025-01-05 RX ADMIN — ONDANSETRON 4 MG: 2 INJECTION INTRAMUSCULAR; INTRAVENOUS at 02:16

## 2025-01-05 RX ADMIN — MORPHINE SULFATE 4 MG: 4 INJECTION, SOLUTION INTRAMUSCULAR; INTRAVENOUS at 02:53

## 2025-01-05 SDOH — ECONOMIC STABILITY: INCOME INSECURITY: IN THE PAST 12 MONTHS HAS THE ELECTRIC, GAS, OIL, OR WATER COMPANY THREATENED TO SHUT OFF SERVICES IN YOUR HOME?: NO

## 2025-01-05 SDOH — HEALTH STABILITY: PHYSICAL HEALTH: ON AVERAGE, HOW MANY DAYS PER WEEK DO YOU ENGAGE IN MODERATE TO STRENUOUS EXERCISE (LIKE A BRISK WALK)?: 5 DAYS

## 2025-01-05 SDOH — ECONOMIC STABILITY: FOOD INSECURITY: WITHIN THE PAST 12 MONTHS, THE FOOD YOU BOUGHT JUST DIDN'T LAST AND YOU DIDN'T HAVE MONEY TO GET MORE.: NEVER TRUE

## 2025-01-05 SDOH — ECONOMIC STABILITY: FOOD INSECURITY: HOW HARD IS IT FOR YOU TO PAY FOR THE VERY BASICS LIKE FOOD, HOUSING, MEDICAL CARE, AND HEATING?: SOMEWHAT HARD

## 2025-01-05 SDOH — SOCIAL STABILITY: SOCIAL INSECURITY: DO YOU FEEL UNSAFE GOING BACK TO THE PLACE WHERE YOU ARE LIVING?: NO

## 2025-01-05 SDOH — SOCIAL STABILITY: SOCIAL INSECURITY
WITHIN THE LAST YEAR, HAVE YOU BEEN KICKED, HIT, SLAPPED, OR OTHERWISE PHYSICALLY HURT BY YOUR PARTNER OR EX-PARTNER?: NO

## 2025-01-05 SDOH — SOCIAL STABILITY: SOCIAL INSECURITY: HAS ANYONE EVER THREATENED TO HURT YOUR FAMILY OR YOUR PETS?: NO

## 2025-01-05 SDOH — SOCIAL STABILITY: SOCIAL INSECURITY: DO YOU FEEL ANYONE HAS EXPLOITED OR TAKEN ADVANTAGE OF YOU FINANCIALLY OR OF YOUR PERSONAL PROPERTY?: NO

## 2025-01-05 SDOH — SOCIAL STABILITY: SOCIAL INSECURITY
WITHIN THE LAST YEAR, HAVE YOU BEEN RAPED OR FORCED TO HAVE ANY KIND OF SEXUAL ACTIVITY BY YOUR PARTNER OR EX-PARTNER?: NO

## 2025-01-05 SDOH — SOCIAL STABILITY: SOCIAL INSECURITY: WITHIN THE LAST YEAR, HAVE YOU BEEN HUMILIATED OR EMOTIONALLY ABUSED IN OTHER WAYS BY YOUR PARTNER OR EX-PARTNER?: NO

## 2025-01-05 SDOH — ECONOMIC STABILITY: HOUSING INSECURITY: IN THE LAST 12 MONTHS, WAS THERE A TIME WHEN YOU WERE NOT ABLE TO PAY THE MORTGAGE OR RENT ON TIME?: NO

## 2025-01-05 SDOH — SOCIAL STABILITY: SOCIAL INSECURITY: WITHIN THE LAST YEAR, HAVE YOU BEEN AFRAID OF YOUR PARTNER OR EX-PARTNER?: NO

## 2025-01-05 SDOH — SOCIAL STABILITY: SOCIAL INSECURITY: WERE YOU ABLE TO COMPLETE ALL THE BEHAVIORAL HEALTH SCREENINGS?: YES

## 2025-01-05 SDOH — ECONOMIC STABILITY: HOUSING INSECURITY: IN THE PAST 12 MONTHS, HOW MANY TIMES HAVE YOU MOVED WHERE YOU WERE LIVING?: 0

## 2025-01-05 SDOH — HEALTH STABILITY: PHYSICAL HEALTH: ON AVERAGE, HOW MANY MINUTES DO YOU ENGAGE IN EXERCISE AT THIS LEVEL?: 30 MIN

## 2025-01-05 SDOH — ECONOMIC STABILITY: FOOD INSECURITY: WITHIN THE PAST 12 MONTHS, YOU WORRIED THAT YOUR FOOD WOULD RUN OUT BEFORE YOU GOT THE MONEY TO BUY MORE.: NEVER TRUE

## 2025-01-05 SDOH — SOCIAL STABILITY: SOCIAL INSECURITY: HAVE YOU HAD ANY THOUGHTS OF HARMING ANYONE ELSE?: NO

## 2025-01-05 SDOH — ECONOMIC STABILITY: HOUSING INSECURITY: AT ANY TIME IN THE PAST 12 MONTHS, WERE YOU HOMELESS OR LIVING IN A SHELTER (INCLUDING NOW)?: NO

## 2025-01-05 SDOH — SOCIAL STABILITY: SOCIAL INSECURITY: DOES ANYONE TRY TO KEEP YOU FROM HAVING/CONTACTING OTHER FRIENDS OR DOING THINGS OUTSIDE YOUR HOME?: NO

## 2025-01-05 SDOH — SOCIAL STABILITY: SOCIAL INSECURITY: HAVE YOU HAD THOUGHTS OF HARMING ANYONE ELSE?: NO

## 2025-01-05 SDOH — SOCIAL STABILITY: SOCIAL INSECURITY: ABUSE: ADULT

## 2025-01-05 SDOH — HEALTH STABILITY: MENTAL HEALTH: CURRENT SMOKER: 0

## 2025-01-05 SDOH — SOCIAL STABILITY: SOCIAL INSECURITY: ARE YOU OR HAVE YOU BEEN THREATENED OR ABUSED PHYSICALLY, EMOTIONALLY, OR SEXUALLY BY ANYONE?: NO

## 2025-01-05 SDOH — SOCIAL STABILITY: SOCIAL INSECURITY: ARE THERE ANY APPARENT SIGNS OF INJURIES/BEHAVIORS THAT COULD BE RELATED TO ABUSE/NEGLECT?: NO

## 2025-01-05 SDOH — ECONOMIC STABILITY: TRANSPORTATION INSECURITY: IN THE PAST 12 MONTHS, HAS LACK OF TRANSPORTATION KEPT YOU FROM MEDICAL APPOINTMENTS OR FROM GETTING MEDICATIONS?: NO

## 2025-01-05 ASSESSMENT — ACTIVITIES OF DAILY LIVING (ADL)
BATHING: INDEPENDENT
HEARING - LEFT EAR: FUNCTIONAL
FEEDING YOURSELF: INDEPENDENT
PATIENT'S MEMORY ADEQUATE TO SAFELY COMPLETE DAILY ACTIVITIES?: YES
JUDGMENT_ADEQUATE_SAFELY_COMPLETE_DAILY_ACTIVITIES: YES
ADEQUATE_TO_COMPLETE_ADL: YES
LACK_OF_TRANSPORTATION: NO
HEARING - RIGHT EAR: FUNCTIONAL
DRESSING YOURSELF: INDEPENDENT
WALKS IN HOME: INDEPENDENT
TOILETING: INDEPENDENT
LACK_OF_TRANSPORTATION: NO
ASSISTIVE_DEVICE: DENTURES LOWER;DENTURES UPPER
GROOMING: INDEPENDENT

## 2025-01-05 ASSESSMENT — ENCOUNTER SYMPTOMS
CHILLS: 0
DYSURIA: 0
EYE PAIN: 0
PALPITATIONS: 0
HEADACHES: 0
COUGH: 0
EYE REDNESS: 0
NECK PAIN: 1
FLANK PAIN: 0
ABDOMINAL PAIN: 0
VOMITING: 0
SINUS PAIN: 0
SHORTNESS OF BREATH: 1
DIZZINESS: 0
CONFUSION: 0
FEVER: 0

## 2025-01-05 ASSESSMENT — COGNITIVE AND FUNCTIONAL STATUS - GENERAL
MOBILITY SCORE: 24
DAILY ACTIVITIY SCORE: 24
PATIENT BASELINE BEDBOUND: NO

## 2025-01-05 ASSESSMENT — PAIN SCALES - GENERAL
PAINLEVEL_OUTOF10: 8
PAINLEVEL_OUTOF10: 5 - MODERATE PAIN
PAINLEVEL_OUTOF10: 9
PAINLEVEL_OUTOF10: 4
PAINLEVEL_OUTOF10: 8
PAINLEVEL_OUTOF10: 10 - WORST POSSIBLE PAIN
PAINLEVEL_OUTOF10: 7
PAINLEVEL_OUTOF10: 6
PAINLEVEL_OUTOF10: 7
PAINLEVEL_OUTOF10: 10 - WORST POSSIBLE PAIN
PAINLEVEL_OUTOF10: 9
PAINLEVEL_OUTOF10: 9
PAINLEVEL_OUTOF10: 0 - NO PAIN
PAINLEVEL_OUTOF10: 8
PAINLEVEL_OUTOF10: 7
PAINLEVEL_OUTOF10: 8

## 2025-01-05 ASSESSMENT — COLUMBIA-SUICIDE SEVERITY RATING SCALE - C-SSRS
1. IN THE PAST MONTH, HAVE YOU WISHED YOU WERE DEAD OR WISHED YOU COULD GO TO SLEEP AND NOT WAKE UP?: NO
1. IN THE PAST MONTH, HAVE YOU WISHED YOU WERE DEAD OR WISHED YOU COULD GO TO SLEEP AND NOT WAKE UP?: NO
6. HAVE YOU EVER DONE ANYTHING, STARTED TO DO ANYTHING, OR PREPARED TO DO ANYTHING TO END YOUR LIFE?: NO
6. HAVE YOU EVER DONE ANYTHING, STARTED TO DO ANYTHING, OR PREPARED TO DO ANYTHING TO END YOUR LIFE?: NO
2. HAVE YOU ACTUALLY HAD ANY THOUGHTS OF KILLING YOURSELF?: NO
2. HAVE YOU ACTUALLY HAD ANY THOUGHTS OF KILLING YOURSELF?: NO

## 2025-01-05 ASSESSMENT — PAIN DESCRIPTION - LOCATION
LOCATION: NECK

## 2025-01-05 ASSESSMENT — LIFESTYLE VARIABLES
HOW MANY STANDARD DRINKS CONTAINING ALCOHOL DO YOU HAVE ON A TYPICAL DAY: 1 OR 2
AUDIT-C TOTAL SCORE: 4
HAS A RELATIVE, FRIEND, DOCTOR, OR ANOTHER HEALTH PROFESSIONAL EXPRESSED CONCERN ABOUT YOUR DRINKING OR SUGGESTED YOU CUT DOWN: NO
HOW OFTEN DURING THE LAST YEAR HAVE YOU BEEN UNABLE TO REMEMBER WHAT HAPPENED THE NIGHT BEFORE BECAUSE YOU HAD BEEN DRINKING: NEVER
AUDIT-C TOTAL SCORE: 4
HOW OFTEN DO YOU HAVE A DRINK CONTAINING ALCOHOL: 2-3 TIMES A WEEK
HOW OFTEN DURING THE LAST YEAR HAVE YOU HAD A FEELING OF GUILT OR REMORSE AFTER DRINKING: NEVER
AUDIT TOTAL SCORE: 0
SKIP TO QUESTIONS 9-10: 0
HOW OFTEN DURING THE LAST YEAR HAVE YOU NEEDED AN ALCOHOLIC DRINK FIRST THING IN THE MORNING TO GET YOURSELF GOING AFTER A NIGHT OF HEAVY DRINKING: NEVER
HOW OFTEN DO YOU HAVE 6 OR MORE DRINKS ON ONE OCCASION: LESS THAN MONTHLY
HAVE YOU OR SOMEONE ELSE BEEN INJURED AS A RESULT OF YOUR DRINKING: NO
AUDIT TOTAL SCORE: 4
HOW OFTEN DURING THE LAST YEAR HAVE YOU FAILED TO DO WHAT WAS NORMALLY EXPECTED FROM YOU BECAUSE OF DRINKING: NEVER
HOW OFTEN DURING THE LAST YEAR HAVE YOU FOUND THAT YOU WERE NOT ABLE TO STOP DRINKING ONCE YOU HAD STARTED: NEVER

## 2025-01-05 ASSESSMENT — PAIN - FUNCTIONAL ASSESSMENT
PAIN_FUNCTIONAL_ASSESSMENT: 0-10

## 2025-01-05 ASSESSMENT — PATIENT HEALTH QUESTIONNAIRE - PHQ9
SUM OF ALL RESPONSES TO PHQ9 QUESTIONS 1 & 2: 0
1. LITTLE INTEREST OR PLEASURE IN DOING THINGS: NOT AT ALL
2. FEELING DOWN, DEPRESSED OR HOPELESS: NOT AT ALL

## 2025-01-05 ASSESSMENT — PAIN DESCRIPTION - PAIN TYPE: TYPE: ACUTE PAIN

## 2025-01-05 ASSESSMENT — PAIN DESCRIPTION - ORIENTATION: ORIENTATION: MID

## 2025-01-05 NOTE — CARE PLAN
History and Presentation  79-year-old male with a history of paroxysmal atrial fibrillation (on Xarelto), CAD s/p PCI, chronic back pain, hypertension, and hyperlipidemia, presenting with a fall and significant trauma resulting in a large epidural hematoma (spanning from craniocervical junction to thoracic spine), a C6 lamina fracture, and severe cord compression. The patient’s INR was elevated at 2.6 on admission, and Kcentra was administered at 5am / dose of 4400.    Patient was transferred for further evaluation and management of anticoagulation reversal and stabilization. Imaging reveals extensive epidural hematoma with cord compression at multiple levels and a C6 lamina fracture. He is currently managed with ceftriaxone, azithromycin, and supportive care for concomitant RSV pneumonia.    Timeline of Events  1/1/25: Patient began experiencing cough and congestion.  1/3/25: Presented to outside hospital after fall with cervical pain. Imaging showed epidural hematoma. Administered Kcentra at OSH.  1/5/25: Transferred for further care, ongoing INR of 2.6.  Workup Summary  Imaging:  CT C-spine: Epidural hematoma, moderate to severe cord compression, and C6 lamina fracture.  MRI recommended for further evaluation of spinal canal and cord compression.  Labs:  INR: 2.6 (elevated).  aPTT: 35 seconds.  Platelets: 142.  BNP: 332 (elevated).  RSV PCR: Positive.  Infectious Workup:  Multifocal pneumonia confirmed on imaging.  RSV positive; no other pathogens identified.    Assessment and Recommendations  # Coagulopathy and Anticoagulation Management  -Current INR of 2.6 despite Kcentra administration at OSH.  -No hx of liver disease  -Given ongoing risk of hematoma expansion and severe cord compression:  -Administer Vitamin K IV (10 mg) to support sustained reversal and rechecking the INR in 6 hours, with the option of repeating Kcentra if INR remains elevated or bleeding risk persists    # Epidural Hematoma and Cord  Compression  -Neurosurgery is consulted; MRI should be expedited to guide surgical planning.    Thank you for this consult, a full consult note to follow tomorrow.  Patient discussed with attending physician, Dr. Castro  who agrees with the above.      Please page hematology for any questions.    Constantino Wallace  Hematology-Oncology Fellow, PGY4  Hematology Consult Pager: [phone]  Oncology Consult Pager: [phone]

## 2025-01-05 NOTE — ED TRIAGE NOTES
"Pt to Ed with c/o fall down 3-4 steps last night around 0200. States was on floor for \"awhile\" Pt on xarelto for afib. Received 4050 units of k centra prior to arrival. Pt has bilateral IV access and is alert and oriented x4. Pt has extraction collar upon arrival and has large hematoma underneath c-collar edge slightly left of midsternal. Pt also has skin tear on right shoulder that was dressed with mepilex and vaseline gauze after pt rolled with attending at bedside to maintain c-spine precautions after switching pt to aspen collar.   "

## 2025-01-05 NOTE — OP NOTE
C4-7 laminectomy for evacuation of epidural hematoma Operative Note     Date: 2025  OR Location: [facility] OR    Name: Naveen Arauz, : 1945, Age: 79 y.o., MRN: [id number], Sex: male    Diagnosis  Pre-op Diagnosis      * Epidural hematoma (Multi) [S06.4XAA] Post-op Diagnosis     * Epidural hematoma (Multi) [S06.4XAA]     Procedures  C4-C7 laminectomies for evacuation of epidural hematoma    Surgeons      * Ramiro Perez - Primary    Resident/Fellow/Other Assistant:  Surgeons and Role:     * Guy Cuenca MD - Resident - Assisting    Staff:   Yamilet Scrub: Selvin Woodard Circulator: Markie  Scrub Person: Tania  Circulator: Dennis    Anesthesia Staff: Anesthesiologist: Jeremie Morris MD  CRNA: SHIRLEY De Leon-CRNA  Anesthesia Resident: Juan Pablo Aburto MD; Mila Martinez MD    Procedure Summary  Anesthesia: General  ASA: III  Estimated Blood Loss: 100 mL  Intra-op Medications:   Administrations occurring from 1355 to 1845 on 25:   Medication Name Total Dose   lidocaine-epinephrine PF (Xylocaine W/EPI) 1 %-1:200,000 injection 10 mL   bacitracin ointment 1 Application   polymyxin B injection 500,000 Units   azithromycin 500 mg in dextrose 5% 250 mL IV Cannot be calculated   cefTRIAXone (Rocephin) 1 g in dextrose (iso) IV 50 mL Cannot be calculated   albumin human 5 % 500 mL   ceFAZolin (Ancef) vial 1 g 2 g   dexAMETHasone (Decadron) 4 mg/mL 10 mg   esmolol (Brevibloc) 10 mcg/mL  IV bolus 30 mg   fentaNYL (Sublimaze) injection 50 mcg/mL 100 mcg   HYDROmorphone (Dilaudid) injection 1 mg/mL 0.2 mg   LR infusion Cannot be calculated   lidocaine (Xylocaine) injection 2 % 80 mg   magnesium sulfate IV 2 g/50 mL water (premix) 2 g   ondansetron 2 mg/mL 4 mg   phenylephrine (Iban-Synephrine) injection 360 mcg   propofol (Diprivan) injection 10 mg/mL 220 mg   rocuronium (ZeMuron) 50 mg/5 mL injection 110 mg   sugammadex (Bridion) 200 mg/2 mL injection 400 mg              Anesthesia  Record               Intraprocedure I/O Totals          Intake    Magnesium Sulfate 0.00 mL    The total shown is the total volume documented since Anesthesia Start was filed.    LR infusion 1000.00 mL    Total Intake 1000 mL       Output    Urine 250 mL    Total Output 250 mL       Net    Net Volume 750 mL          Specimen:   ID Type Source Tests Collected by Time   A :  Blood Blood, Arterial BLOOD GAS ARTERIAL FULL PANEL Ramiro Perez MD PhD 1/5/2025 1538                 Drains and/or Catheters:   Closed/Suction Drain 1 Superior;Medial Back Accordion 10 Fr. (Active)       Findings: compressive epidural hematoma status post good evacuation    Indications: Naveen Arauz is an 79 y.o. male who is having surgery for Epidural hematoma (Multi) [S06.4XAA].     Informed Consent:  The risks, benefits, complications, and alternatives were discussed with the patient. I have explained the surgical procedure in detail with expected duration and extent of recovery along risks of surgery that include, but is not limited to bleeding, infection, blood vessel injury or damage, loss of sensation, loss of bladder, bowel or sexual function, nerve injury/damage resulting in weakness/paralysis, malunion, nonunion, CSF leak, brachial plexus injury, peripheral vision blindness, failure of implants/fusion, failure to relieve symptoms, recurrent disease, adjacent segment disease, need to reoperate for any reason and general anesthesia reaction such as stroke, coma, heart attack, delirium, confusion, death as well as worsening of preexisted medical conditions.     I clearly emphasized that while the goal of surgery is to decompress the spinal cord so as to ARREST the progression of neurological deficits - preexisting deficits may or may not improve after surgery. We discussed that many patients do clinically improve in functional and neurological outcomes following decompression of the spinal elements in patients but the extent of  which is variable and depends on the severity of pain, numbness, tingling, or weakness. With improvement seen of those symptoms in that order. We did discuss the goal of surgery to alleviate pain first and foremost and hope for recovery of all neurologic function with time.     All questions were answered and the patient was amenable to proceed.       DESCRIPTION OF THE OPERATION:   The patient was brought to the operating room theater. A verbal Huddle was performed confirming the patient by name, date of birth, medical record number, site of surgery. After all team members were in agreement, she underwent anesthesia induction without complication. Two large bore IVs were placed as well as endotracheal tube. Perioperative antibiotic administration was confirmed. Patient's head was placed into a [place] headholder and the patient was flipped prone onto a regular table with gel rolls and their neck was prepped and draped in a sterile fashion. Using lateral fluoroscopy we confirmed our level of interest with a spinal needle and an incision was marked out in the midline.     A midline incision was designed and infiltrated with local anesthetic with epinephrine.  The skin was then incised with a #10 blade.  With the use of monopolar electrocautery, a subperiosteal dissection was performed at C4-C7.  During this time, we encountered extensive soft tissue swelling.  Fluoroscopy was used to confirm the correct levels.    We then turned our attention to the decompression.  Using a high-speed drill, a trough was made on either side at the junction of the lamina and lateral masses of C4-C6.  A [place] rongeur was used to remove the interspinous ligaments at C3-C4 and C6-C7.  Next, while applying gentle traction to the involved lamina, curettes and Kerrison rongeurs were used to resect the ligamentum flavum and remove the spinous processes and lamina of C4-C6 en bloc.  At this time, we encountered extensive dorsal epidural  hematoma, which was subsequently removed.  Kerrison rongeurs were then used to smooth the laminectomy trough edges and to perform a partial C7 laminectomy.  A red rubber catheter was then used to irrigate beneath the C3 and residual C7 laminae.  A [place] elevator was used to confirm that the thecal sac was adequately decompressed.    We then turned our attention to the closure.  Excellent hemostasis was achieved.  A subfascial drain was placed.  The muscle and fascial layers were closed with interrupted 0 Vicryl sutures.  The subcutaneous layer was closed with interrupted 2-0 Vicryl sutures.  The skin was closed with a running subcuticular stitch using 3-0 Stratafix barbed suture followed by topical adhesive.    There were no complications.  The counts were reported to be correct by the nursing staff.  After extubation, the patient was returned to the ICU in stable condition.      Complications:  None; patient tolerated the procedure well.    Disposition: ICU - extubated and stable.  Condition: stable     Attending Attestation: I was present and scrubbed for the key portions of the procedure.    Ramiro Perez  Phone Number: [phone]

## 2025-01-05 NOTE — PROGRESS NOTES
Addendum: updated Morphine 30mg formulation to SA (previously entered as MSIR), quantity dispensed: #75, 30DS - confirmed with [facility] and [facility] [place] report 1/5/24 20:55    Pharmacy Medication History Review    Naveen Arauz is a 79 y.o. male admitted for Epidural hematoma (Multi). Pharmacy reviewed the patient's [id number] medications and allergies for accuracy.    Medications ADDED:  Azithromycin 250 mg tab  Mucinex 600 mg tab  Albuterol 90 mcg inhaler  Medications CHANGED:  N/A  Medications REMOVED:   N/A     The list below reflects the updated PTA list.   Prior to Admission Medications   Prescriptions Last Dose Informant   Mucinex 600 mg 12 hr tablet  Self   Sig: Take 1 tablet (600 mg) by mouth 2 times a day as needed for cough or congestion.   albuterol 90 mcg/actuation inhaler  Self   Sig: Inhale 2 puffs once daily as needed for wheezing or shortness of breath.   aspirin 81 mg EC tablet  Self   Sig: Take 1 tablet (81 mg) by mouth once daily.   atorvastatin (Lipitor) 40 mg tablet  Self   Sig: TAKE 1 TABLET BY MOUTH EVERY NIGHT AT BEDTIME   azithromycin (Zithromax) 250 mg tablet  Self   Sig: TAKE TWO TABLETS AS A SINGLE DOSE ON DAY ONE, THEN ONE TABLET DAILY UNTIL GONE   colchicine 0.6 mg tablet  Self   Sig: Take 1 tablet (0.6 mg) by mouth once daily.   dexlansoprazole (Dexilant) 60 mg DR capsule  Self   Sig: Take 1 capsule (60 mg) by mouth once daily.   metoprolol succinate XL (Toprol-XL) 100 mg 24 hr tablet  Self   Sig: Take 0.5 tablets (50 mg) by mouth once daily. Do not crush or chew.   metoprolol succinate XL (Toprol-XL) 50 mg 24 hr tablet Not Taking Self   Sig: Take 1 tablet (50 mg) by mouth once daily.   Patient not taking: Reported on 1/5/2025   morphine CR (MS Contin) 30 mg 12 hr tablet  Self   Sig: TAKE ONE TABLET BY MOUTH EVERY 8 HOURS FOR PAIN -TAKE 1 TABLET TWICE A DAY SCHEDULED AND MAY TAKE ONE EXTRA DOSE OF MORPHINE SA IN THE AFTERNOON IF NEEDED FOR PAIN -TAKE 1  "TABLET TWICE A DAY SCHEDULED AND MAY TAKE ONE EXTRA DOSE OF MORPHINE SA IN THE AFTERNOON IF NEEDED FOR PAIN   rivaroxaban (Xarelto) 20 mg tablet  Self   Sig: Take 1 tablet (20 mg) by mouth once daily.      Facility-Administered Medications: None          The list below reflects the updated allergy list. Please review each documented allergy for additional clarification and justification.  Allergies  Reviewed by Gracy Adams on 1/5/2025   No Known Allergies         Patient accepts M2B at discharge.     Sources:   [facility]  Pharmacy dispense history  Spouse, Good historian     Additional Comments:  I interviewed the patients wife, the wife had a currently med list on hand. [facility] morphine[facility] xarelto[facility]raz[facility]      GRACY ADAMS  Pharmacy Technician  01/05/25     Secure Chat preferred   If no response call [phone] or [employer] \"Med Rec\"   "

## 2025-01-05 NOTE — ANESTHESIA PROCEDURE NOTES
Airway  Date/Time: 1/5/2025 2:43 PM  Urgency: elective    Airway not difficult    Staffing  Performed: CRNA   Authorized by: Jeremie Morris MD    Performed by: SHIRLEY De Leon-TIMMY  Patient location during procedure: OR    Indications and Patient Condition  Indications for airway management: anesthesia  Spontaneous Ventilation: absent  Sedation level: deep  Preoxygenated: yes  Patient position: sniffing  Mask difficulty assessment: 0 - not attempted    Final Airway Details  Final airway type: endotracheal airway      Successful airway: ETT  Cuffed: yes   Successful intubation technique: video laryngoscopy  Facilitating devices/methods: intubating stylet  Blade: Nayeli  Blade size: #4  ETT size (mm): 7.5  Cormack-Lehane Classification: grade I - full view of glottis  Placement verified by: chest auscultation and capnometry   Measured from: lips  ETT to lips (cm): 23  Number of attempts at approach: 1

## 2025-01-05 NOTE — ANESTHESIA POSTPROCEDURE EVALUATION
Patient: Naveen Arauz    Procedure Summary       Date: 01/05/25 Room / Location: [facility] OR 24 / [facility] OR    Anesthesia Start: 1436 Anesthesia Stop: 1731    Procedure: C4-6 laminectomy for epidural hematoma (Back) Diagnosis:       Epidural hematoma (Multi)      (Epidural hematoma (Multi) [S06.4XAA])    Surgeons: Ramiro Perez MD PhD Responsible Provider: Jeremie Morris MD    Anesthesia Type: general ASA Status: 3 - Emergent            Anesthesia Type: general    Vitals Value Taken Time   /76 01/05/25 1731   Temp 36.5 °C (97.7 °F) 01/05/25 1728   Pulse 83 01/05/25 1730   Resp 23 01/05/25 1730   SpO2 99 % 01/05/25 1730   Vitals shown include unfiled device data.    Anesthesia Post Evaluation    Patient location during evaluation: ICU  Patient participation: complete - patient participated  Level of consciousness: awake  Pain management: adequate  Airway patency: patent  Cardiovascular status: acceptable  Respiratory status: acceptable  Hydration status: acceptable  Postoperative Nausea and Vomiting: none        No notable events documented.

## 2025-01-05 NOTE — ED PROVIDER NOTES
Emergency Department Provider Note          History of Present Illness     CC: Fall     History provided by: Patient and EMS  Limitations to History: None    HPI:   Naveen Arauz is a 79 y.o.male with PMH COPD, HLD, BPH, CAD, HTN, GERD, paroxysmal A-fib (on Eliquis) presenting to the Emergency Department as a transfer from [facility] for neurosurgical evaluation.  Patient reportedly had a mechanical fall last evening in which he landed on his face.  Positive head strike, positive loss of consciousness.  CT imaging at outside hospital showed epidural hematoma of the cervical and thoracic spine.  Patient had notable weakness of the right hand.  Was transferred to [facility] for further evaluation.  On arrival to [facility], patient reports his pain is a 6 out of 10 for us today.  Denies fevers, chills, chest pain, shortness of breath, abdominal pain, or changes in urination/stool.  Does have small ecchymosis to the anterior mediastinal region thought to be 2/2 C collar and not traumatic injury.  Was reversed with Kcentra at outside hospital. Also notably found to have RSV+ and multifocal pneumonia at OSH, started on CTX + azithro.    Records Reviewed: Recent available ED and inpatient notes reviewed in EMR.    PMHx/PSHx:  Per HPI.   - has a past medical history of A-fib (Multi), Arrhythmia, Coronary artery disease, Gout, Hyperlipidemia, Hypertension, and Transient cerebral ischemic attack, unspecified.  - has a past surgical history that includes Other surgical history (11/19/2018); Other surgical history (11/19/2018); Other surgical history (11/19/2018); Other surgical history (11/19/2018); MR angio head wo IV contrast (11/4/2018); MR angio neck wo IV contrast (11/4/2018); and Cardiac catheterization (N/A, 11/17/2023).  - has Paroxysmal atrial fibrillation (Multi); Coronary artery disease; Disorder of rotator cuff; Impingement syndrome of shoulder region; Disorder of tendon of biceps; Essential hypertension;  Gastroesophageal reflux disease; Hemangioma of skin and subcutaneous tissue; History of coronary artery stent placement; Lyme disease; Melanocytic nevi of trunk; Melanocytic nevi of unspecified lower limb, including hip; Melanocytic nevi of unspecified upper limb, including shoulder; Dermatitis, unspecified; Other hypertrophic disorders of the skin; Other melanin hyperpigmentation; Actinic keratosis; Other seborrheic keratosis; Abnormal compliance of bladder; BPH (benign prostatic hyperplasia); Urethral stricture; Fall; Closed head injury; Closed nondisplaced fracture of sixth cervical vertebra; RSV (respiratory syncytial virus infection); Pneumonia of both lower lobes due to infectious organism; Fracture of unspecified parts of lumbosacral spine and pelvis, initial encounter for closed fracture (Multi); and Epidural hematoma (Multi) on their problem list.    Medications:  Current Outpatient Medications   Medication Instructions    albuterol 90 mcg/actuation inhaler 2 puffs, inhalation, Daily PRN    aspirin 81 mg EC tablet 1 tablet, oral, Daily    atorvastatin (LIPITOR) 40 mg, oral, Nightly    azithromycin (Zithromax) 250 mg tablet TAKE TWO TABLETS AS A SINGLE DOSE ON DAY ONE, THEN ONE TABLET DAILY UNTIL GONE    colchicine 0.6 mg, oral, Daily    dexlansoprazole (Dexilant) 60 mg DR capsule 1 capsule, oral, Daily    metoprolol succinate XL (Toprol-XL) 100 mg 24 hr tablet 0.5 tablets, oral, Daily, Do not crush or chew.    metoprolol succinate XL (TOPROL-XL) 50 mg, oral, Daily    morphine CR (MS Contin) 30 mg 12 hr tablet TAKE ONE TABLET BY MOUTH EVERY 8 HOURS FOR PAIN -TAKE 1 TABLET TWICE A DAY SCHEDULED AND MAY TAKE ONE EXTRA DOSE OF MORPHINE SA IN THE AFTERNOON IF NEEDED FOR PAIN -TAKE 1 TABLET TWICE A DAY SCHEDULED AND MAY TAKE ONE EXTRA DOSE OF MORPHINE SA IN THE AFTERNOON IF NEEDED FOR PAIN    Mucinex 600 mg, oral, 2 times daily PRN    rivaroxaban (Xarelto) 20 mg tablet 1 tablet, oral, Daily         Allergies:  Nsaids (non-steroidal anti-inflammatory drug)    Social History:  - Tobacco:  reports that he quit smoking about 52 years ago. His smoking use included cigarettes. He has never used smokeless tobacco.   - Alcohol:  reports current alcohol use.   - Illicit Drugs:  reports no history of drug use.     ROS:  Per HPI.       Physical Exam     Triage Vitals:  T 36.5 °C (97.7 °F)  HR 77  /53  RR 26  O2 95 % (4L NC) Supplemental oxygen    General: Awake, alert, in no acute distress  Eyes: Gaze conjugate.  No scleral icterus or injection  HENT: Normo-cephalic, atraumatic. No stridor. Small hematoma to anterior mediastinal region.  CV: Regular rate, regular rhythm. Radial pulses 2+ bilaterally  Resp: Breathing non-labored, speaking in full sentences.  Clear to auscultation bilaterally  GI: Soft, non-distended, non-tender. No rebound or guarding.  MSK/Extremities: No gross bony deformities. Moving all extremities  Skin: Warm. Appropriate color  Neuro: Alert. Oriented. Face symmetric. Speech is fluent. 5/5 strength with wrist/elbow flexion and extension. 4/5 hand  on the right. 5/5 strenth in the LE. Sensation intact in all 4 extremities.  Psych: Appropriate mood and affect          [place] Coma Scale Score: 15                    Medical Decision Making & ED Course     EKG: EKG interpreted by myself. Please see ED Course for full interpretation.    Medical Decision Making   Naveen Arauz is a 79 y.o.male presenting to the Emergency Department as a transfer from [place] for neurosurgical/trauma surgical evaluation.  On arrival, vital signs within normal limits, afebrile for us.  Examination, patient has 5/5 strength with upper extremity flexion and extension.  4-5 strength with right hand  compared to left.  Sensation intact.  No lower extremity deficits.  Cranial nerves intact.  Patient otherwise mentating appropriately with a GCS of 15.  Neurosurgery/trauma surgeon consulted on patient's  evaluation.  MRI of the cervical and thoracic spines ordered.  Will get preop labs as well. Given patients mutlfocal pneumonia seen on CT, will continue on CTX/Azithromycin    Update: Labs notable for a chemistry relatively unremarkable.  MRI of the cervical and thoracic spine showed redemonstration of extra-axial collection of the C4-C7 hematoma with moderate spinal canal stenosis.  After discussion with neurosurgery and trauma surgical teams, likely plan for operative intervention next couple days. Will Admit to the trauma ICU today      ED Course as of 01/06/25 1120   Sun Jan 05, 2025   1038 ECG 12 lead (Clinic Performed)  Interpreted by me.  1/5/2025 at 1024.  Sinus rhythm with occasional premature atrial complex with aberrant conduction 78 bpm.  , QRS 74, QTc 460.  No ST elevation. [MC]      ED Course User Index  [MC] Hector Maloney MD         Diagnoses as of 01/06/25 1120   Epidural hematoma (Multi)       Independent Result Review and Interpretation: Relevant laboratory and radiographic results were reviewed and independently interpreted by myself.  As necessary, they are commented on in the ED Course.    Chronic conditions affecting the patient's care: As documented above in MDM.      Disposition   Admit    Kevin Kimbrough MD  Emergency Medicine PGY3      Procedures     Procedures ? Smart[employer] last updated 1/6/2025 11:20 AM        Kevin Kimbrough MD  Resident  01/06/25 1120

## 2025-01-05 NOTE — ED PROVIDER NOTES
HPI   Chief Complaint   Patient presents with    Fall    Neck Pain    Shoulder Pain         History provided by:  Medical records, patient, spouse and EMS personnel   used: No      This patient presents to the emergency department via ambulance for evaluation after a fall at home.  Patient states he was going down the stairs in his own home and missed the second from the bottom stairs causing him to fall backwards.  He did not pass out.  He is having severe pain in his neck radiating to his mid back and to both shoulders.  Patient is on Xarelto due to a history of A-fib.  His last dose was 5:30 PM.    Patient states he was up late because he is coughing and congested.  This has been going on since January 1.  He saw his primary doctor the day after and was started on a steroid and an inhaler.  Patient states that his pulse ox is usually normal it was 98% when he was at the doctor's office, but he has been more short of breath and coughing more today.  He denies any chest pain, abdominal pain.  No nausea or vomiting.  He had some swelling in his left foot last week because he had a gout flare.    He is denying any traumatic pain in his extremities.  Patient has a history of coronary artery disease with previous stent placement.  Also history of hypertension and chronic back and neck pain.      Patient History   Past Medical History:   Diagnosis Date    A-fib (Multi)     Arrhythmia     Coronary artery disease     Gout     Hyperlipidemia     Hypertension     Transient cerebral ischemic attack, unspecified     TIA (transient ischemic attack)     Past Surgical History:   Procedure Laterality Date    CARDIAC CATHETERIZATION N/A 11/17/2023    Procedure: Left Heart Cath;  Surgeon: Manuel Blackwell MD;  Location: [facility] Cardiac Cath Lab;  Service: Cardiovascular;  Laterality: N/A;    MR HEAD ANGIO WO IV CONTRAST  11/4/2018    MR HEAD ANGIO WO IV CONTRAST 11/4/2018 [facility] EMERGENCY LEGACY    MR NECK ANGIO WO  IV CONTRAST  2018    MR NECK ANGIO WO IV CONTRAST 2018 [facility]    OTHER SURGICAL HISTORY  2018    Stomach surgery    OTHER SURGICAL HISTORY  2018    Knee surgery    OTHER SURGICAL HISTORY  2018    Back surgery    OTHER SURGICAL HISTORY  2018    Cardiac catheterization with stent placement     Family History   Problem Relation Name Age of Onset    Heart attack Mother      Heart attack Father      Heart attack Other Aunt      Social History     Tobacco Use    Smoking status: Former     Current packs/day: 0.00     Types: Cigarettes     Quit date:      Years since quittin.0    Smokeless tobacco: Never   Substance Use Topics    Alcohol use: Yes     Comment: Occasional    Drug use: Never       Physical Exam   ED Triage Vitals   Temperature Heart Rate Respirations BP   25 0329 25 0215 25 0215 25 0215   36.8 °C (98.2 °F) 72 (!) 28 (!) 165/96      SpO2 Temp src Heart Rate Source Patient Position   25 0157 -- -- --   93 %         BP Location FiO2 (%)     -- --             Physical Exam  Vitals reviewed.   Constitutional:       Comments: Uncomfortable, coughing mild perioral cyanosis   HENT:      Head: Normocephalic and atraumatic.      Comments: No raccoon's eyes or Ward sign     Right Ear: Tympanic membrane normal.      Left Ear: Tympanic membrane normal.      Ears:      Comments: No hemotympanum     Nose: Nose normal.      Mouth/Throat:      Mouth: Mucous membranes are moist.   Eyes:      Extraocular Movements: Extraocular movements intact.      Conjunctiva/sclera: Conjunctivae normal.      Pupils: Pupils are equal, round, and reactive to light.   Neck:      Comments: Cervical collar in place  Cardiovascular:      Rate and Rhythm: Normal rate and regular rhythm.      Pulses: Normal pulses.   Pulmonary:      Breath sounds: Rhonchi present.   Chest:      Chest wall: No tenderness.   Abdominal:      General: Abdomen is flat. Bowel sounds  are normal.      Palpations: Abdomen is soft.   Musculoskeletal:         General: Tenderness present.      Cervical back: Tenderness present.      Comments: From mid cervical to thoracolumbar junction.  No crepitus, step-off, deformity.  Pelvis nontender and stable.  No acromioclavicular step-off.  Normal range of motion shoulders.   Skin:     General: Skin is warm and dry.      Capillary Refill: Capillary refill takes less than 2 seconds.   Neurological:      General: No focal deficit present.      Mental Status: He is alert and oriented to person, place, and time.      Cranial Nerves: No cranial nerve deficit.      Sensory: No sensory deficit.      Motor: No weakness.   Psychiatric:         Mood and Affect: Mood normal.           ED Course & MDM   ED Course as of 01/05/25 0615   Sun Jan 05, 2025   0249 Patient reassessed, breathing better, but still having pain. Family says he's been sick since new years, but worse for past couple days [MN]   0346 Received report from radiologist. Patient and family updated. [MN]   0347 Xarelto reversal ordered [MN]   0539 Reassessed, remain NVI. [MN]      ED Course User Index  [MN] Yudi Harrison MD         Diagnoses as of 01/05/25 0615   Fall, initial encounter   Closed head injury, initial encounter   Closed nondisplaced fracture of sixth cervical vertebra, unspecified fracture morphology, initial encounter   RSV (respiratory syncytial virus infection)   Pneumonia of both lower lobes due to infectious organism   Fracture of unspecified parts of lumbosacral spine and pelvis, initial encounter for closed fracture (Multi)          Labs Reviewed   CBC WITH AUTO DIFFERENTIAL - Abnormal       Result Value    WBC 12.7 (*)     nRBC 0.0      RBC 4.56      Hemoglobin 15.3      Hematocrit 45.4            MCH 33.6      MCHC 33.7      RDW 13.1      Platelets 142 (*)     Neutrophils % 67.2      Immature Granulocytes %, Automated 1.7 (*)     Lymphocytes % 19.7      Monocytes % 9.2       Eosinophils % 1.6      Basophils % 0.6      Neutrophils Absolute 8.51 (*)     Immature Granulocytes Absolute, Automated 0.21      Lymphocytes Absolute 2.49      Monocytes Absolute 1.17 (*)     Eosinophils Absolute 0.20      Basophils Absolute 0.07     COMPREHENSIVE METABOLIC PANEL - Abnormal    Glucose 155 (*)     Sodium 132 (*)     Potassium 4.2      Chloride 95 (*)     Bicarbonate 27      Anion Gap 14      Urea Nitrogen 10      Creatinine 0.79      eGFR >90      Calcium 8.7      Albumin 4.1      Alkaline Phosphatase 138 (*)     Total Protein 7.1      AST 37      Bilirubin, Total 0.8      ALT 30     B-TYPE NATRIURETIC PEPTIDE - Abnormal     (*)     Narrative:        <100 pg/mL - Heart failure unlikely  100-299 pg/mL - Intermediate probability of acute heart                  failure exacerbation. Correlate with clinical                  context and patient history.    >=300 pg/mL - Heart Failure likely. Correlate with clinical                  context and patient history.    BNP testing is performed using different testing methodology at [facility] than at [facility]. Direct result comparisons should only be made within the same method.      RSV PCR - Abnormal    RSV PCR Detected (*)     Narrative:     This assay is an FDA-cleared, in vitro diagnostic nucleic acid amplification test for the detection of RSV from nasopharyngeal specimens, and has been validated for use at [facility]. Negative results do not preclude RSV infections, and should not be used as the sole basis for diagnosis, treatment, or other management decisions. If Influenza A/B and RSV PCR results are negative, testing for Parainfluenza virus, Adenovirus and Metapneumovirus is routinely performed for pediatric oncology and intensive care inpatients at [facility], and is available on other patients by placing an add-on request.       PROTIME-INR - Abnormal    Protime 29.9 (*)     INR 2.6 (*)     MAGNESIUM - Normal    Magnesium 1.72     TROPONIN I, HIGH SENSITIVITY - Normal    Troponin I, High Sensitivity 15      Narrative:     Less than 99th percentile of normal range cutoff-  Female and children under 18 years old <14 ng/L; Male <21 ng/L: Negative  Repeat testing should be performed if clinically indicated.     Female and children under 18 years old 14-50 ng/L; Male 21-50 ng/L:  Consistent with possible cardiac damage and possible increased clinical   risk. Serial measurements may help to assess extent of myocardial damage.     >50 ng/L: Consistent with cardiac damage, increased clinical risk and  myocardial infarction. Serial measurements may help assess extent of   myocardial damage.      NOTE: Children less than 1 year old may have higher baseline troponin   levels and results should be interpreted in conjunction with the overall   clinical context.     NOTE: Troponin I testing is performed using a different   testing methodology at [facility] than at [facility]. Direct result comparisons should only   be made within the same method.   INFLUENZA A AND B PCR - Normal    Flu A Result Not Detected      Flu B Result Not Detected      Narrative:     This assay is an in vitro diagnostic multiplex nucleic acid amplification test for the detection and discrimination of Influenza A & B from nasopharyngeal specimens, and has been validated for use at [facility]. Negative results do not preclude Influenza A/B infections, and should not be used as the sole basis for diagnosis, treatment, or other management decisions. If Influenza A/B and RSV PCR results are negative, testing for Parainfluenza virus, Adenovirus and Metapneumovirus is routinely performed for [facility] pediatric oncology and intensive care inpatients, and is available on other patients by placing an add-on request.   SARS-COV-2 PCR - Normal    Coronavirus 2019, PCR Not Detected      Narrative:     This  assay has received FDA Emergency Use Authorization (EUA) and is only authorized for the duration of time that circumstances exist to justify the authorization of the emergency use of in vitro diagnostic tests for the detection of SARS-CoV-2 virus and/or diagnosis of COVID-19 infection under section 564(b)(1) of the Act, 21 U.S.C. 360bbb-3(b)(1). This assay is an in vitro diagnostic nucleic acid amplification test for the qualitative detection of SARS-CoV-2 from nasopharyngeal specimens and has been validated for use at [facility]. Negative results do not preclude COVID-19 infections and should not be used as the sole basis for diagnosis, treatment, or other management decisions.     APTT - Normal    aPTT 35      Narrative:     The APTT is no longer used for monitoring Unfractionated Heparin Therapy. For monitoring Heparin Therapy, use the Heparin Assay.   BLOOD CULTURE   BLOOD CULTURE   GRAY TOP    Extra Tube Hold for add-ons.     TYPE AND SCREEN     ED Medication Administration from 01/05/2025 0148 to 01/05/2025 0447         Date/Time Order Dose Route Action Action by     01/05/2025 0158 EST oxygen (O2) therapy 5 L/min inhalation Start Rolf, A     01/05/2025 0216 EST morphine injection 4 mg 4 mg intravenous Given Rice, C     01/05/2025 0216 EST ondansetron (Zofran) injection 4 mg 4 mg intravenous Given Rice, C     01/05/2025 0217 EST furosemide (Lasix) injection 20 mg 20 mg intravenous Given Rice, C     01/05/2025 0253 EST morphine injection 4 mg 4 mg intravenous Given Rice, C     01/05/2025 0320 EST iohexol (OMNIPaque) 350 mg iodine/mL solution 75 mL 75 mL intravenous Given Ferl-Napierrobiwski, S     01/05/2025 0425 EST azithromycin (Zithromax) injection 500 mg  - Omnicell Override Pull --  Override Pull Rice, C     01/05/2025 0435 EST azithromycin (Zithromax) 500 mg in dextrose 5% 250 mL  mg intravenous New Bag Rice, C     01/05/2025 0436 EST cefTRIAXone (Rocephin) 2 g in dextrose  (iso) IV 50 mL 2 g intravenous New Bag Rice, C     01/05/2025 0437 EST morphine injection 4 mg 4 mg intravenous Given Rice, C          CT thoracic spine wo IV contrast   Final Result   CT CHEST/ABDOMEN/PELVIS:   1. Multifocal consolidative pneumonia within the bilateral lower   lobes and right middle lobe with diffuse airways wall thickening   concerning for endobronchial spread of infection. An aspiration   etiology is not excluded given the bibasilar predominant distribution   given the small amount of layering debris in the trachea and right   mainstem/lower lobe bronchus.   2. Mild diffuse mediastinal and bilateral hilar lymphadenopathy that   is likely at least in part reactive to the pneumonia and could be   chronic in the setting of emphysema as well.   3. Emphysema and a 0.3 cm right upper lobe pulmonary nodule.   Continued annual low-dose lung cancer screening is recommended.   4. Aneurysmal dilatation of the ascending aorta measuring 4.2 x 4.1   cm and mildly dilated main pulmonary artery that is suggestive of   pulmonary hypertension.   5. No acute pulmonary embolism.   6. Cholelithiasis without evidence of acute cholecystitis.   7. No acute traumatic injury in the chest, abdomen, or pelvis.             CT THORACIC AND LUMBAR SPINE:   1. No acute fracture or traumatic malalignment.        MACRO:   None.        Signed by: Reinier Knight 1/5/2025 4:10 AM   Dictation workstation:   [id number]      CT lumbar spine wo IV contrast   Final Result   CT CHEST/ABDOMEN/PELVIS:   1. Multifocal consolidative pneumonia within the bilateral lower   lobes and right middle lobe with diffuse airways wall thickening   concerning for endobronchial spread of infection. An aspiration   etiology is not excluded given the bibasilar predominant distribution   given the small amount of layering debris in the trachea and right   mainstem/lower lobe bronchus.   2. Mild diffuse mediastinal and bilateral hilar lymphadenopathy that    is likely at least in part reactive to the pneumonia and could be   chronic in the setting of emphysema as well.   3. Emphysema and a 0.3 cm right upper lobe pulmonary nodule.   Continued annual low-dose lung cancer screening is recommended.   4. Aneurysmal dilatation of the ascending aorta measuring 4.2 x 4.1   cm and mildly dilated main pulmonary artery that is suggestive of   pulmonary hypertension.   5. No acute pulmonary embolism.   6. Cholelithiasis without evidence of acute cholecystitis.   7. No acute traumatic injury in the chest, abdomen, or pelvis.             CT THORACIC AND LUMBAR SPINE:   1. No acute fracture or traumatic malalignment.        MACRO:   None.        Signed by: Reinier Knight 1/5/2025 4:10 AM   Dictation workstation:   [id number]      CT angio chest for pulmonary embolism   Final Result   CT CHEST/ABDOMEN/PELVIS:   1. Multifocal consolidative pneumonia within the bilateral lower   lobes and right middle lobe with diffuse airways wall thickening   concerning for endobronchial spread of infection. An aspiration   etiology is not excluded given the bibasilar predominant distribution   given the small amount of layering debris in the trachea and right   mainstem/lower lobe bronchus.   2. Mild diffuse mediastinal and bilateral hilar lymphadenopathy that   is likely at least in part reactive to the pneumonia and could be   chronic in the setting of emphysema as well.   3. Emphysema and a 0.3 cm right upper lobe pulmonary nodule.   Continued annual low-dose lung cancer screening is recommended.   4. Aneurysmal dilatation of the ascending aorta measuring 4.2 x 4.1   cm and mildly dilated main pulmonary artery that is suggestive of   pulmonary hypertension.   5. No acute pulmonary embolism.   6. Cholelithiasis without evidence of acute cholecystitis.   7. No acute traumatic injury in the chest, abdomen, or pelvis.             CT THORACIC AND LUMBAR SPINE:   1. No acute fracture or traumatic  malalignment.        MACRO:   None.        Signed by: Reinier Knight 1/5/2025 4:10 AM   Dictation workstation:   [id number]      CT abdomen pelvis w IV contrast   Final Result   CT CHEST/ABDOMEN/PELVIS:   1. Multifocal consolidative pneumonia within the bilateral lower   lobes and right middle lobe with diffuse airways wall thickening   concerning for endobronchial spread of infection. An aspiration   etiology is not excluded given the bibasilar predominant distribution   given the small amount of layering debris in the trachea and right   mainstem/lower lobe bronchus.   2. Mild diffuse mediastinal and bilateral hilar lymphadenopathy that   is likely at least in part reactive to the pneumonia and could be   chronic in the setting of emphysema as well.   3. Emphysema and a 0.3 cm right upper lobe pulmonary nodule.   Continued annual low-dose lung cancer screening is recommended.   4. Aneurysmal dilatation of the ascending aorta measuring 4.2 x 4.1   cm and mildly dilated main pulmonary artery that is suggestive of   pulmonary hypertension.   5. No acute pulmonary embolism.   6. Cholelithiasis without evidence of acute cholecystitis.   7. No acute traumatic injury in the chest, abdomen, or pelvis.             CT THORACIC AND LUMBAR SPINE:   1. No acute fracture or traumatic malalignment.        MACRO:   None.        Signed by: Reinier Knight 1/5/2025 4:10 AM   Dictation workstation:   [id number]      CT head wo IV contrast   Final Result   CT HEAD:   1. No acute intracranial abnormality or calvarial fracture.                  CT CERVICAL SPINE:   1. Large long segment acute epidural hematoma in the cervical and   thoracic spinal canal beginning at the craniocervical junction and   measuring up to 0.8 cm in maximal thickness at C2 posteriorly. Result   in varying degrees of moderate to severe cord compression with   moderate cord compression at C2 and severe cord compression suspected   at C5-C6 due to  combination of the circumferential epidural hematoma   and disc spur complex. MRI of the cervical and thoracic spine is   recommended for further evaluation.        2. Acute nondisplaced fracture of the left C6 lamina extending into   its inferior articulating process.        MACRO:   Reinier Knight discussed the significance and urgency of this   critical finding via [employer] with confirmation of receipt with   DARIEN LARA on 1/5/2025 at 3:45 am.  (**-RCF-**) Findings:  See   findings.        Signed by: Reinier Knight 1/5/2025 3:46 AM   Dictation workstation:   [id number]      CT cervical spine wo IV contrast   Final Result   CT HEAD:   1. No acute intracranial abnormality or calvarial fracture.                  CT CERVICAL SPINE:   1. Large long segment acute epidural hematoma in the cervical and   thoracic spinal canal beginning at the craniocervical junction and   measuring up to 0.8 cm in maximal thickness at C2 posteriorly. Result   in varying degrees of moderate to severe cord compression with   moderate cord compression at C2 and severe cord compression suspected   at C5-C6 due to combination of the circumferential epidural hematoma   and disc spur complex. MRI of the cervical and thoracic spine is   recommended for further evaluation.        2. Acute nondisplaced fracture of the left C6 lamina extending into   its inferior articulating process.        MACRO:   Reinier Knight discussed the significance and urgency of this   critical finding via [employer] with confirmation of receipt with   DARIEN LARA on 1/5/2025 at 3:45 am.  (**-RCF-**) Findings:  See   findings.        Signed by: Reinier Knight 1/5/2025 3:46 AM   Dictation workstation:   [id number]                 No data recorded                         EKG  0158 --twelve-lead EKG was obtained and read by me. This demonstrates normal sinus rhythm with a rate of 74, normal intervals, normal axes, no ectopy, no ischemia, no  pericarditis. There was no change compared to most recent prior EKG. 10/7/24    Diagnoses as of 01/05/25 0455   Fall, initial encounter   Closed head injury, initial encounter   Closed nondisplaced fracture of sixth cervical vertebra, unspecified fracture morphology, initial encounter   RSV (respiratory syncytial virus infection)   Pneumonia of both lower lobes due to infectious organism   Fracture of unspecified parts of lumbosacral spine and pelvis, initial encounter for closed fracture (Multi)       Medical Decision Making  Patient presents emergency department with the above history and physical.  Paramedics had treated the patient with Solu-Medrol and a breathing treatment en route.  Patient's room air sat was only 87% he was placed on nasal cannula oxygen with good saturation.  Clinically patient sounds wet and with his history of cardiac disease concern for possibility of congestive failure.  Differential diagnosis also includes pneumonia, PE, viral infection.  Patient given IV Lasix.  CT chest obtained read by radiology no PE, mild thoracic aortic dilatation without any evidence of traumatic injury.  Dense pneumonia bilateral lower and right middle lobes.  Patient's RSV is positive.  Rocephin and Zithromax ordered.    Regarding the trauma injury on Xarelto CT imaging obtained of brain, cervical spine, thoracic spine, lumbar spine, chest, abdomen, pelvis.  These were read by radiology.  No acute intracranial process.  Nondisplaced C6 fracture with epidural hematoma with areas of cord compression, fracture of L1 osteophyte, cholelithiasis without any acute abdominal injury.    Trauma consult was immediately obtained on receipt of stat CT report.  Patient is excepted for transfer.  Was discussed by phone with Dr. Cortez of the trauma service at [facility] including patient's past medical history, presenting signs and symptoms, current clinical condition and test results.  She agrees with reversing the Xarelto with  Kcentra as Andexxa is not available at this facility.  Case was also discussed with Dr. Gonzalez on call for Spine at [facility]. She is in agreement with the plan.    Procedure  Critical Care    Performed by: Yudi Harrison MD  Authorized by: Yudi Harrison MD    Critical care provider statement:     Critical care time (minutes):  46    Critical care time was exclusive of:  Separately billable procedures and treating other patients    Critical care was necessary to treat or prevent imminent or life-threatening deterioration of the following conditions:  Trauma    Critical care was time spent personally by me on the following activities:  Development of treatment plan with patient or surrogate, discussions with consultants, evaluation of patient's response to treatment, examination of patient, obtaining history from patient or surrogate, review of old charts, re-evaluation of patient's condition, pulse oximetry, ordering and review of radiographic studies, ordering and review of laboratory studies and ordering and performing treatments and interventions    Care discussed with: accepting provider at another facility         Yudi Harrison MD  01/05/25 9365       Yudi Harrison MD  01/05/25 3990

## 2025-01-05 NOTE — CONSULTS
Date of Service:  1/5/2025 Attending Provider:  Hector Maloney MD     Reason for Consultation:  Naveen Arauz is being seen today for a consult requested by Hector Maloney MD for cervical/thoracic epidural hematoma.      Subjective   History of Present Illness:   Naveen is a 79 y.o. male with HTN, HLD, COPD, Afib (on Xarelto), CAD s/p PCI mid RCA (2015, on ASA), gout, GERD, BPH, p/w fall, +LOC, s/p kcentra, CTH neg, CT CTL epidural hematoma extending from craniocervical junction to C6, L C6 nondisplaced laminar fx    Patient fell down 3-4 steps last night around 0200 and currently endorses severe posterior neck pain and BUE numbness and tingling in the hands. He reports that he is normally very active and works in his yard consistently. Since falling he has noticed his RUE is slightly weaker, specifically with his handgrip. Otherwise patient denies any other neurological symptoms. He takes ASA and Xarelto daily for Afib and his cardiac stent, last dose 1/4.     Patient also reports having previous cervical and lumbar surgeries several years ago. He is unsure of the levels operated on but reports his LUE was almost paralyzed due to a probably disc herniation and has regained his strength since surgery. He also reports having abdominal surgery for gastric ulcers.       Review of Systems   10 point ROS is obtained and negative except the ones mentioned in the HPI    Social History  He reports that he quit smoking about 52 years ago. His smoking use included cigarettes. He has never used smokeless tobacco. He reports current alcohol use. He reports that he does not use drugs.    Medical History  Past Medical History:   Diagnosis Date    A-fib (Multi)     Arrhythmia     Coronary artery disease     Gout     Hyperlipidemia     Hypertension     Transient cerebral ischemic attack, unspecified     TIA (transient ischemic attack)       Surgical History  Past Surgical History:   Procedure Laterality Date    CARDIAC  CATHETERIZATION N/A 11/17/2023    Procedure: Left Heart Cath;  Surgeon: Manuel Blackwell MD;  Location: [facility];  Service: Cardiovascular;  Laterality: N/A;    MR HEAD ANGIO WO IV CONTRAST  11/4/2018    MR HEAD ANGIO WO IV CONTRAST 11/4/2018 [facility]    MR NECK ANGIO WO IV CONTRAST  11/4/2018    MR NECK ANGIO WO IV CONTRAST 11/4/2018 [facility]    OTHER SURGICAL HISTORY  11/19/2018    Stomach surgery    OTHER SURGICAL HISTORY  11/19/2018    Knee surgery    OTHER SURGICAL HISTORY  11/19/2018    Back surgery    OTHER SURGICAL HISTORY  11/19/2018    Cardiac catheterization with stent placement        Objective     Vitals:  There were no vitals filed for this visit.      Exam:  Constitutional: No acute distress  Resp: breathing comfortably  Cardio: well perfused  GI: nondistended  MSK: full range of motion  Neuro: Awake, A&Ox3, C-collar in place   Cranial Nerves II-XII: PERRL, EOMI, Face symmetric, Facial SILT, Palate/Tongue midline and symmetric, shoulder shrugs symmetric, hearing intact to finger rubs bilaterally  Motor:   RUE D5, B5, T4, HG4+, IO4 (unable to properly assess IO due to known contractures)   LUE D5, B5, T4+, HG5, IO4 (unable to properly assess IO due to known contractures)   RLE HF 5, KE5, PF5, EHL5, DF5  LLE HF 5, KE5, PF5, EHL5, DF5   Numbness and tingling in BUE hands and forearms   DTRS: 2+ Throughout, No Hoffmans or Clonus  Psych: appropriate  Skin: no obvious lesions      Medications  Current Outpatient Medications   Medication Instructions    aspirin 81 mg EC tablet 1 tablet, oral, Daily    atorvastatin (LIPITOR) 40 mg, oral, Nightly    colchicine 0.6 mg, oral, Daily    dexlansoprazole (Dexilant) 60 mg DR capsule 1 capsule, oral, Daily    metoprolol succinate XL (TOPROL-XL) 50 mg, oral, Daily    morphine (MSIR) 30 mg tablet 1 tablet, oral, 2 times daily    rivaroxaban (Xarelto) 20 mg tablet 1 tablet, Daily        Diagnostic Results:    Lab Results   Component  Value Date    WBC 12.7 (H) 01/05/2025    HGB 15.3 01/05/2025    HCT 45.4 01/05/2025     01/05/2025     (L) 01/05/2025     Lab Results   Component Value Date    CREATININE 0.79 01/05/2025    BUN 10 01/05/2025     (L) 01/05/2025    K 4.2 01/05/2025    CL 95 (L) 01/05/2025    CO2 27 01/05/2025     Lab Results   Component Value Date    INR 2.6 (H) 01/05/2025    INR 1.5 (H) 11/14/2023    INR 1.3 (H) 06/06/2019    PROTIME 29.9 (H) 01/05/2025    PROTIME 16.6 (H) 11/14/2023    PROTIME 14.8 (H) 06/06/2019       Imaging Results:    MR cervical spine wo IV contrast    (Results Pending)   MR thoracic spine wo IV contrast    (Results Pending)             Assessment/Plan   Assessment:  Naveen is a 79 y.o. male with HTN, HLD, COPD, Afib (on Xarelto), CAD s/p PCI mid RCA (2015, on ASA), gout, GERD, BPH, p/w fall, +LOC, s/p kcentra, CTH neg, CT CTL epidural hematoma extending from craniocervical junction to C6, L C6 nondisplaced laminar fx, MRI C/T spine epidural hematoma with severe canal stenosis at C5-6 with moderate stenosis at C3-4, C4-5, C6-7    Patient currently endorses severe posterior neck pain and BUE numbness and tingling. Patient also endorses RUE weakness most pronounced with his handgrip. Imaging significant for cervical epidural hematoma and severe canal stenosis at C5-6. On exam RUE T4 and HG4+ and LUE T4 otherwise was 5/5. Due to patients acute weakness, recommend emergent OR for hematoma evacuation.     Plan:  Trauma primary   - OR today for for Cervical lami/fusion for hematoma evac    - Please obtain preop labs: CBC/RFP/coags/T&S/UA/EKG/CXR  - Hold any AC/AP  - SCDs for DVT ppx, hold SQH    Hieu Yeager MD    Note authored by resident on neurosurgery team, with all questions or to contact team please page at [phone]  Plan not finalized until note signed by attending

## 2025-01-05 NOTE — H&P
[facility]  TRAUMA SERVICE - CONSULT    Patient Name: Naveen Arauz  MRN: [id number]  Admit Date: 105  : 1945  AGE: 79 y.o.   GENDER: male  ==============================================================================  MECHANISM OF INJURY / CHIEF COMPLAINT:   79M on xarelto for afib (last taken last night 5:30 pm) s/p mechanical fall down two steps when talking down them at 2AM this morning. +head strike without LOC. Complained of neck pain, taken to [facility] [place]. Pan scan notable for C6 fx with large spinal EDH. Grossly NVI since, moderate neck pain, no new numbness/tingling. Also started on CAP treatment, has had dyspnea for past few days, new oxygen requirement.     Injuries/problems:  -ND C6 left lamina fx extending to inferior articular facet  - Large spinal EDH with severe cord compression/stenosis  - multifocal community acquired PNA, RSV  -HX HTN, HLD, COPD (no home O2), Afib (on Xarelto), CAD s/p PCI mid RCA and TIA on asa, gout, GERD, BPH, pulm HTN, lumbago, esophageal stricture    INCIDENTAL FINDINGS:  Lung nodule, cholelithiasis, ascending aortic aneurysm 4.2x4.1 cm    ==============================================================================  ADMISSION PLAN OF CARE:    - Trauma ICU admission for close airway, neuro monitoring with concerning C-spine injury  - Neurospine consulted, MRI obtained, Strict C-spine precautions in aspen collar. Urgent to OR for hematoma ángel, lami/fusion  - MRA to rule out BCVI -> negative  - 5 days abx for CAP  - S/p kcentra, follow heme recs regarding vitamin K administration    Seen and discussed with chief [place], Dr. Yoni Huber, [facility]  Trauma surgery  [id number]    ==============================================================================  PAST MEDICAL HISTORY:   PMH:   Past Medical History:   Diagnosis Date    A-fib (Multi)     Arrhythmia     Coronary artery disease     Gout     Hyperlipidemia     Hypertension      Transient cerebral ischemic attack, unspecified     TIA (transient ischemic attack)     Last menstrual period: n/a    PSH:   Past Surgical History:   Procedure Laterality Date    CARDIAC CATHETERIZATION N/A 2023    Procedure: Left Heart Cath;  Surgeon: Manuel Blackwell MD;  Location: [facility];  Service: Cardiovascular;  Laterality: N/A;    MR HEAD ANGIO WO IV CONTRAST  2018    MR HEAD ANGIO WO IV CONTRAST 2018 [facility]    MR NECK ANGIO WO IV CONTRAST  2018    MR NECK ANGIO WO IV CONTRAST 2018 [facility]    OTHER SURGICAL HISTORY  2018    Stomach surgery    OTHER SURGICAL HISTORY  2018    Knee surgery    OTHER SURGICAL HISTORY  2018    Back surgery    OTHER SURGICAL HISTORY  2018    Cardiac catheterization with stent placement     FH:   Family History   Problem Relation Name Age of Onset    Heart attack Mother      Heart attack Father      Heart attack Other Aunt      SOCIAL HISTORY:    Smoking:    Social History     Tobacco Use   Smoking Status Former    Current packs/day: 0.00    Types: Cigarettes    Quit date:     Years since quittin.0   Smokeless Tobacco Never       Alcohol:    Social History     Substance and Sexual Activity   Alcohol Use Yes    Comment: Occasional       Drug use: denies    MEDICATIONS: pending review  Prior to Admission medications    Medication Sig Start Date End Date Taking? Authorizing Provider   aspirin 81 mg EC tablet Take 1 tablet (81 mg) by mouth once daily. 18   Historical Provider, MD   atorvastatin (Lipitor) 40 mg tablet TAKE 1 TABLET BY MOUTH EVERY NIGHT AT BEDTIME 24   SHIRLEY Duffy-CNP   colchicine 0.6 mg tablet Take 1 tablet (0.6 mg) by mouth once daily. 10/7/24 10/7/25  SHIRLEY Duffy-CNP   dexlansoprazole (Dexilant) 60 mg DR capsule Take 1 capsule (60 mg) by mouth once daily. 18   Historical Provider, MD   metoprolol succinate XL (Toprol-XL)  50 mg 24 hr tablet Take 1 tablet (50 mg) by mouth once daily. 10/13/23 10/12/24  Elaine Welch, APRN-CNP   morphine (MSIR) 30 mg tablet Take 1 tablet (30 mg) by mouth 2 times a day. 11/19/18   Historical Provider, MD   rivaroxaban (Xarelto) 20 mg tablet Take 1 tablet (20 mg) by mouth once daily. 11/19/18   Historical Provider, MD     ALLERGIES:   No Known Allergies    REVIEW OF SYSTEMS:  Review of Systems   Constitutional:  Negative for chills and fever.   HENT:  Negative for ear pain and sinus pain.    Eyes:  Negative for pain and redness.   Respiratory:  Positive for shortness of breath. Negative for cough.    Cardiovascular:  Negative for chest pain and palpitations.   Gastrointestinal:  Negative for abdominal pain and vomiting.   Genitourinary:  Negative for dysuria and flank pain.   Musculoskeletal:  Positive for neck pain. Negative for gait problem.   Neurological:  Negative for dizziness and headaches.   Psychiatric/Behavioral:  Negative for behavioral problems and confusion.      PHYSICAL EXAM  Secondary Survey:  NEURO: A&O x3, GCS 15, CN II-XII intact, PLASENCIA equally, muscle strength 5/5 distally, no sensory deficits  HEAD: NC/AT, No lacerations or abrasions, no bony step offs, midface stable.  EENT: PERRL, external ear without laceration. Nasal septum midline, no crepitus or septal hematoma. Oral mucosa and tongue without lacerations, teeth in place.   NECK: aspen collar in place, trachea midline.   RESPIRATORY/CHEST: No abrasions, contusions, crepitus or tenderness to palpation. Non-labored, equal chest. +R scapular abrasion. expansion, CTAB, no W/R/R. 3L NC. Manubrium ecchymosis from field collar  CV: Rate controlled rhythm. Pulses bilateral: 2+ radial, 2+DP, 2+PT. No TTP of chest  ABDOMEN: soft, nontender, nondistended. No abrasions or lacerations.  PELVIS: Stable to compression.  : nml external genitalia, no blood at urethral meatus  BACK/SPINE: No thoracic midline tenderness, step-offs or  deformities. No lumbar midline tenderness, step-offs, or deformities.  No abrasions, hematomas or lacerations noted.  EXTREMITIES: No edema or cyanosis. Nml ROM w/o pain. No deformities, lacerations or contusions    Imaging:  :  (summary of findings, not a copy of dictation)  Pan scan, MRI C-spine with above findings    LABS:  Results from last 7 days   Lab Units 01/05/25  0157   WBC AUTO x10*3/uL 12.7*   HEMOGLOBIN g/dL 15.3   HEMATOCRIT % 45.4   PLATELETS AUTO x10*3/uL 142*   NEUTROS PCT AUTO % 67.2   LYMPHS PCT AUTO % 19.7   MONOS PCT AUTO % 9.2   EOS PCT AUTO % 1.6     Results from last 7 days   Lab Units 01/05/25  1031 01/05/25  0157   APTT seconds 36 35   INR  1.4* 2.6*     Results from last 7 days   Lab Units 01/05/25  0157   SODIUM mmol/L 132*   POTASSIUM mmol/L 4.2   CHLORIDE mmol/L 95*   CO2 mmol/L 27   BUN mg/dL 10   CREATININE mg/dL 0.79   CALCIUM mg/dL 8.7   PROTEIN TOTAL g/dL 7.1   BILIRUBIN TOTAL mg/dL 0.8   ALK PHOS U/L 138*   ALT U/L 30   AST U/L 37   GLUCOSE mg/dL 155*     Results from last 7 days   Lab Units 01/05/25  0157   BILIRUBIN TOTAL mg/dL 0.8           I have reviewed all laboratory and imaging results ordered/pertinent for this encounter.

## 2025-01-05 NOTE — ANESTHESIA PROCEDURE NOTES
Arterial Line:    Date/Time: 1/5/2025 2:48 PM    Staffing  Performed: resident   Authorized by: Jeremie Morris MD    Performed by: CONSTANTINO De Leon    An arterial line was placed. Procedure performed using surface landmarks.in the OR for the following indication(s): continuous blood pressure monitoring and blood sampling needed.    A 20 gauge (size) (length), Angiocath (type) catheter was placed into the Left radial artery, secured by Tegaderm,   Seldinger technique used.  Events:  patient tolerated procedure well with no complications.

## 2025-01-05 NOTE — ANESTHESIA PREPROCEDURE EVALUATION
Patient: Naveen Arauz    Procedure Information       Anesthesia Start Date/Time: 01/05/25 1436    Procedure: C4-6 laminectomy for epidural hematoma    Location: [facility] [place] [facility] 24 / [facility] [place] [facility]    Surgeons: Ramiro Perez MD PhD        Fell at home, large epidural hematoma    PLASENCIA spontaneously but weak    Relevant Problems   Cardiac   (+) Coronary artery disease   (+) Essential hypertension   (+) Paroxysmal atrial fibrillation (Multi)      Pulmonary   (+) Pneumonia of both lower lobes due to infectious organism      GI   (+) Gastroesophageal reflux disease      /Renal   (+) BPH (benign prostatic hyperplasia)      ID   (+) Lyme disease   (+) Pneumonia of both lower lobes due to infectious organism   (+) RSV (respiratory syncytial virus infection)       Clinical information reviewed:                   NPO Detail:  No data recorded     Physical Exam    Airway  Mallampati: II  TM distance: >3 FB  Neck ROM: full     Cardiovascular   Rhythm: regular     Dental   (+) upper dentures, lower dentures     Pulmonary    Abdominal            Anesthesia Plan    History of general anesthesia?: yes  History of complications of general anesthesia?: no    ASA 3 - emergent     general   (Initialy plan for AFOI given C spine epidural hematoma with symptoms. However, given active RSV and not wanting to aersolize this will plan to RSI and asleep with C spine precautions)  The patient is not a current smoker.    intravenous induction   Anesthetic plan and risks discussed with patient.

## 2025-01-05 NOTE — ANESTHESIA PROCEDURE NOTES
Peripheral IV  Date/Time: 1/5/2025 2:49 PM      Placement  Needle size: 16 G  Laterality: right  Location: wrist  Local anesthetic: none  Site prep: alcohol  Technique: anatomical landmarks  Attempts: 1

## 2025-01-05 NOTE — PROGRESS NOTES
[facility]  TRAUMA SERVICE - PROGRESS NOTE    Patient Name: Naveen Arauz  MRN: [id number]  Admit Date: 105  : 1945  AGE: 79 y.o.   GENDER: male  ==============================================================================  MECHANISM OF INJURY:   79M on xarelto for afib (last taken last night 5:30 pm) s/p mechanical fall down two steps when talking down them at 2AM this morning. +head strike without LOC. Complained of neck pain, taken to [facility]. Pan scan notable for C6 fx with large spinal EDH. Grossly NVI since, moderate neck pain, no new numbness/tingling. Also started on CAP treatment, has had dyspnea for past few days, new oxygen requirement.     LOC (yes/no?): No  Anticoagulant / Anti-platelet Rx? (for what dx?): Xarelto  Referring Facility Name (N/A for scene EMR run): [facility]    INJURIES:   Nondisplaced C6 L lamina fx extending to articular facet  Large spinal epidural hematoma w/ severe cord compression/stenosis     OTHER MEDICAL PROBLEMS:  RSV CAP  HTN  HLD  COPD, not on home oxygen  Afib on xarelto  CAD s/p PCI mid RCA  TIA on ASA  Gout  GERD  BPH  Pulmonary HTN  Esophageal stricture  Lumbago    INCIDENTAL FINDINGS:  Lung nodule  Cholelithiasis  AAA    PROCEDURES:  C4-6 Laminectomy for epidural hematoma evacuation    ==============================================================================  TODAY'S ASSESSMENT AND PLAN OF CARE:  NEURO:   # Lumbago  # Hx TIA on ASA  # Nondisplaced C6 L lamina fx extending to articular facet  # Large spinal epidural hematoma w/ severe cord compression/stenosis   - Pain control with Epidural, IV tylenol, PRN dilaudid 0.2/0.4  - q1h neuro checks    HEENT:  # Esophageal stricture  - Formal swallow study     CV:  # HTN  # HLD  # Afib on xarelto  # CAD s/p PCI mid RCA  - Not on pressors  - MAP goal >65    PULM:  # COPD, not on home oxygen  # Pulmonary HTN  # RSV PNA  - ABG pH WNL, pCO2 42, pO2 65  - IS  - DuoNeb  prn    GI:  # GERD  - NPO   - Formal swallow study 1/6    /FEN:  # BPH  # Gout  # Hyponatremic  - Consider starting silodosin, not on flomax at home  - Cr and BUN WNL  - NS @ 75 while NPO  - Lactate 1.3, wnl  - Na 133  - Replete electrolytes to potassium of 4.0, magnesium of 2.0.  - Mejía in place.    HEME:  - F/U post op labs  - Holding DVT ppx and home ASA  - INR post op 1.4  - Hgb post op 12.3, plts 131  - Hematology consulted, appreciate recs  - OK to administer vit K if needed w/ INR recheck in 6 hrs w/ option to repeat Kcentra  - A line in place    ID:  - RSV PNA  - WBC 12.9  - Continue azithromycin and ceftriaxone for 5 days    ENDO:  - SSI    MSK/SKIN:  # C spine precautions  - Ambulate as tolerated  - PT/OT    F:  NS @ 75mL/hr   E: Replete as needed   N: NPO     GI ppx:  Home PPI    DVT ppx:  SCDs      LINES/DRAINS:  - A-line  - Accordion drain  - PIV x3    Dispo: TICU    Patient discussed with attending physician, Dr. Ballesteros.    Lian Schaefer MD, PGY1  TSICU  Phone: [phone]    ==============================================================================  CHIEF COMPLAINT / OVERNIGHT EVENTS:   79M w/ epidural hematoma and C6 L lamina fx now s/p hematoma evacuation w/ NSGY. INR initially 2.6, was given Kcentra at OSH. On arrival, INR 1.4. Patient taken to OR w/ NSGY emergently. Post op, C collar in place. Patient is endorsing neck pain. No CP, SOB, N/V. No paresthesias. A&Ox4.    MEDICAL HISTORY / ROS:  Admission history and ROS reviewed.     PHYSICAL EXAM:  Heart Rate:  [69-82]   Temp:  [36.3 °C (97.4 °F)-36.8 °C (98.2 °F)]   Resp:  [18-28]   BP: (122-169)/(75-96)   Height:  [182.9 cm (6')]   Weight:  [81 kg (178 lb 9.2 oz)-81.6 kg (180 lb)]   SpO2:  [86 %-95 %]   Physical Exam  Vitals reviewed.   Constitutional:       General: He is not in acute distress.     Appearance: Normal appearance.      Interventions: Cervical collar and nasal cannula in place.   HENT:      Head: Normocephalic.      Comments:  Accordion drain to surgical site, minimal sanguinous output  Some blood streaked across L scalp originating from surgical site.  Swelling of R cheek with small bruise.      Mouth/Throat:      Mouth: Mucous membranes are dry.      Pharynx: Oropharynx is clear.   Eyes:      Conjunctiva/sclera: Conjunctivae normal.      Pupils: Pupils are equal, round, and reactive to light.   Neck:      Comments: Aspen collar in place  Cardiovascular:      Rate and Rhythm: Normal rate and regular rhythm.      Pulses:           Radial pulses are 1+ on the right side and 1+ on the left side.   Pulmonary:      Effort: Pulmonary effort is normal. No respiratory distress or retractions.      Comments: Initially saturating at 87% on RA. Improved to 93% on 4L NC. Not oxygen dependent at baseline.  Abdominal:      General: There is no distension.      Palpations: Abdomen is soft.      Tenderness: There is no abdominal tenderness.   Neurological:      General: No focal deficit present.      Mental Status: He is alert and oriented to person, place, and time.      GCS: GCS eye subscore is 4. GCS verbal subscore is 5. GCS motor subscore is 6.      Cranial Nerves: No cranial nerve deficit.      Sensory: No sensory deficit.      Comments: BLE 5/5 strength on plantar/dorsiflexion  RUE 5/5  strength  LUE 4/5  strength  Epidural in place   Psychiatric:         Thought Content: Thought content normal.         Judgment: Judgment normal.     IMAGING SUMMARY:   MRA neck:   Approximately 30-40% stenosis of the proximal right internal carotid  artery by NASCET criteria.  No significant stenosis of the left internal carotid artery.  Redemonstration of asymmetric smaller caliber of the right vertebral  artery, a normal variant. No evidence of flow cut off.  No evidence of dissection or pseudo aneurysm.    MRI C/T spine:  Redemonstration of extra-axial collection in the posterior aspect of  the spinal canal extending from the craniocervical junction to  the  thoracic region. There is associated mass effect which in combination  with degenerative changes in the cervical region results in severe  spinal canal stenosis with cord compression at the level of C5-C6 and  additional moderate spinal canal stenosis at the levels of C3-C4,  C4-C5 and C6-C7. However, no significant changes in the cord signal.  The extra-axial collection does not result in significant spinal  canal stenosis in the thoracic spine. There are extensive signal  abnormalities in the paraspinal soft tissue of the posterior cervical  region with fluid signal intensity in the inter spinous space at the  level of the C5-C6 and C3-C4, which is concerning for ligamentous  injury. Multiple foci of paraspinous intramuscular hematoma are also  present. Additional ligamentous injury can not be excluded.  There is a small amount of fluid in the anterior prevertebral soft  tissue of the cervical region extending to the upper thoracic region.  Partially visualized marrow edema of the L1 vertebral body, likely in  the setting of acute fracture.    CTH: No acute intracranial abnormality or calvarial fracture.     CT C spine:   1. Large long segment acute epidural hematoma in the cervical and  thoracic spinal canal beginning at the craniocervical junction and  measuring up to 0.8 cm in maximal thickness at C2 posteriorly. Result  in varying degrees of moderate to severe cord compression with  moderate cord compression at C2 and severe cord compression suspected  at C5-C6 due to combination of the circumferential epidural hematoma  and disc spur complex. MRI of the cervical and thoracic spine is  recommended for further evaluation.  2. Acute nondisplaced fracture of the left C6 lamina extending into  its inferior articulating process.    CT C/A/P:  1. Multifocal consolidative pneumonia within the bilateral lower  lobes and right middle lobe with diffuse airways wall thickening  concerning for endobronchial spread of  infection. An aspiration  etiology is not excluded given the bibasilar predominant distribution  given the small amount of layering debris in the trachea and right  mainstem/lower lobe bronchus.  2. Mild diffuse mediastinal and bilateral hilar lymphadenopathy that  is likely at least in part reactive to the pneumonia and could be  chronic in the setting of emphysema as well.  3. Emphysema and a 0.3 cm right upper lobe pulmonary nodule.  Continued annual low-dose lung cancer screening is recommended.  4. Aneurysmal dilatation of the ascending aorta measuring 4.2 x 4.1  cm and mildly dilated main pulmonary artery that is suggestive of  pulmonary hypertension.  5. No acute pulmonary embolism.  6. Cholelithiasis without evidence of acute cholecystitis.  7. No acute traumatic injury in the chest, abdomen, or pelvis.    CT T/L spine: No acute fracture or traumatic malalignment     LABS:  Results from last 7 days   Lab Units 01/05/25  0157   WBC AUTO x10*3/uL 12.7*   HEMOGLOBIN g/dL 15.3   HEMATOCRIT % 45.4   PLATELETS AUTO x10*3/uL 142*   NEUTROS PCT AUTO % 67.2   LYMPHS PCT AUTO % 19.7   MONOS PCT AUTO % 9.2   EOS PCT AUTO % 1.6     Results from last 7 days   Lab Units 01/05/25  1031 01/05/25  0157   APTT seconds 36 35   INR  1.4* 2.6*     Results from last 7 days   Lab Units 01/05/25  1045 01/05/25  0157   SODIUM mmol/L 131* 132*   POTASSIUM mmol/L 4.2 4.2   CHLORIDE mmol/L 94* 95*   CO2 mmol/L 27 27   BUN mg/dL 12 10   CREATININE mg/dL 0.78 0.79   CALCIUM mg/dL 8.7 8.7   PROTEIN TOTAL g/dL 6.8 7.1   BILIRUBIN TOTAL mg/dL 0.8 0.8   ALK PHOS U/L 129 138*   ALT U/L 28 30   AST U/L 47* 37   GLUCOSE mg/dL 184* 155*     Results from last 7 days   Lab Units 01/05/25  1045 01/05/25  0157   BILIRUBIN TOTAL mg/dL 0.8 0.8     Results from last 7 days   Lab Units 01/05/25  1538   POCT PH, ARTERIAL pH 7.33*   POCT PCO2, ARTERIAL mm Hg 52*   POCT PO2, ARTERIAL mm Hg 174*   POCT HCO3 CALCULATED, ARTERIAL mmol/L 27.4*   POCT BASE  EXCESS, ARTERIAL mmol/L 0.6       I have reviewed all medications, laboratory results, and imaging pertinent for today's encounter.

## 2025-01-06 ENCOUNTER — APPOINTMENT (OUTPATIENT)
Dept: RADIOLOGY | Facility: HOSPITAL | Age: 80
End: 2025-01-06
Payer: MEDICARE

## 2025-01-06 VITALS
HEIGHT: 72 IN | RESPIRATION RATE: 19 BRPM | WEIGHT: 178.57 LBS | TEMPERATURE: 98.6 F | OXYGEN SATURATION: 94 % | DIASTOLIC BLOOD PRESSURE: 86 MMHG | HEART RATE: 84 BPM | BODY MASS INDEX: 24.19 KG/M2 | SYSTOLIC BLOOD PRESSURE: 163 MMHG

## 2025-01-06 LAB
ALBUMIN SERPL BCP-MCNC: 3.5 G/DL (ref 3.4–5)
ANION GAP BLDA CALCULATED.4IONS-SCNC: 8 MMO/L (ref 10–25)
ANION GAP SERPL CALC-SCNC: 14 MMOL/L (ref 10–20)
APTT PPP: 31 SECONDS (ref 27–38)
ATRIAL RATE: 77 BPM
BASE EXCESS BLDA CALC-SCNC: 2.8 MMOL/L (ref -2–3)
BODY TEMPERATURE: 37 DEGREES CELSIUS
BUN SERPL-MCNC: 17 MG/DL (ref 6–23)
CA-I BLD-SCNC: 1.18 MMOL/L (ref 1.1–1.33)
CA-I BLDA-SCNC: 1.19 MMOL/L (ref 1.1–1.33)
CALCIUM SERPL-MCNC: 8.7 MG/DL (ref 8.6–10.6)
CHLORIDE BLDA-SCNC: 98 MMOL/L (ref 98–107)
CHLORIDE SERPL-SCNC: 97 MMOL/L (ref 98–107)
CO2 SERPL-SCNC: 28 MMOL/L (ref 21–32)
CREAT SERPL-MCNC: 0.73 MG/DL (ref 0.5–1.3)
EGFRCR SERPLBLD CKD-EPI 2021: >90 ML/MIN/1.73M*2
ERYTHROCYTE [DISTWIDTH] IN BLOOD BY AUTOMATED COUNT: 13 % (ref 11.5–14.5)
GLUCOSE BLD MANUAL STRIP-MCNC: 159 MG/DL (ref 74–99)
GLUCOSE BLD MANUAL STRIP-MCNC: 161 MG/DL (ref 74–99)
GLUCOSE BLD MANUAL STRIP-MCNC: 191 MG/DL (ref 74–99)
GLUCOSE BLDA-MCNC: 177 MG/DL (ref 74–99)
GLUCOSE SERPL-MCNC: 175 MG/DL (ref 74–99)
HCO3 BLDA-SCNC: 27.9 MMOL/L (ref 22–26)
HCT VFR BLD AUTO: 34.1 % (ref 41–52)
HCT VFR BLD EST: 31 % (ref 41–52)
HGB BLD-MCNC: 11.6 G/DL (ref 13.5–17.5)
HGB BLDA-MCNC: 10.4 G/DL (ref 13.5–17.5)
INHALED O2 CONCENTRATION: 40 %
INR PPP: 1.3 (ref 0.9–1.1)
LACTATE BLDA-SCNC: 1.1 MMOL/L (ref 0.4–2)
MAGNESIUM SERPL-MCNC: 2.22 MG/DL (ref 1.6–2.4)
MCH RBC QN AUTO: 33 PG (ref 26–34)
MCHC RBC AUTO-ENTMCNC: 34 G/DL (ref 32–36)
MCV RBC AUTO: 97 FL (ref 80–100)
NRBC BLD-RTO: 0 /100 WBCS (ref 0–0)
OXYHGB MFR BLDA: 95.6 % (ref 94–98)
P AXIS: 83 DEGREES
P OFFSET: 165 MS
P ONSET: 121 MS
PCO2 BLDA: 44 MM HG (ref 38–42)
PH BLDA: 7.41 PH (ref 7.38–7.42)
PHOSPHATE SERPL-MCNC: 3.4 MG/DL (ref 2.5–4.9)
PLATELET # BLD AUTO: 132 X10*3/UL (ref 150–450)
PO2 BLDA: 81 MM HG (ref 85–95)
POTASSIUM BLDA-SCNC: 4.1 MMOL/L (ref 3.5–5.3)
POTASSIUM SERPL-SCNC: 4.1 MMOL/L (ref 3.5–5.3)
PR INTERVAL: 196 MS
PROTHROMBIN TIME: 15.2 SECONDS (ref 9.8–12.8)
Q ONSET: 219 MS
QRS COUNT: 13 BEATS
QRS DURATION: 90 MS
QT INTERVAL: 394 MS
QTC CALCULATION(BAZETT): 445 MS
QTC FREDERICIA: 428 MS
R AXIS: 36 DEGREES
RBC # BLD AUTO: 3.51 X10*6/UL (ref 4.5–5.9)
SAO2 % BLDA: 97 % (ref 94–100)
SODIUM BLDA-SCNC: 130 MMOL/L (ref 136–145)
SODIUM SERPL-SCNC: 135 MMOL/L (ref 136–145)
T AXIS: 57 DEGREES
T OFFSET: 416 MS
VENTRICULAR RATE: 77 BPM
WBC # BLD AUTO: 13.3 X10*3/UL (ref 4.4–11.3)

## 2025-01-06 PROCEDURE — 99223 1ST HOSP IP/OBS HIGH 75: CPT | Performed by: STUDENT IN AN ORGANIZED HEALTH CARE EDUCATION/TRAINING PROGRAM

## 2025-01-06 PROCEDURE — 92526 ORAL FUNCTION THERAPY: CPT | Mod: GN

## 2025-01-06 PROCEDURE — 82330 ASSAY OF CALCIUM: CPT

## 2025-01-06 PROCEDURE — 85027 COMPLETE CBC AUTOMATED: CPT

## 2025-01-06 PROCEDURE — 85610 PROTHROMBIN TIME: CPT

## 2025-01-06 PROCEDURE — 2500000002 HC RX 250 W HCPCS SELF ADMINISTERED DRUGS (ALT 637 FOR MEDICARE OP, ALT 636 FOR OP/ED)

## 2025-01-06 PROCEDURE — 84132 ASSAY OF SERUM POTASSIUM: CPT

## 2025-01-06 PROCEDURE — 97535 SELF CARE MNGMENT TRAINING: CPT | Mod: GO

## 2025-01-06 PROCEDURE — 92610 EVALUATE SWALLOWING FUNCTION: CPT | Mod: GN

## 2025-01-06 PROCEDURE — 83735 ASSAY OF MAGNESIUM: CPT

## 2025-01-06 PROCEDURE — 99232 SBSQ HOSP IP/OBS MODERATE 35: CPT | Performed by: SURGERY

## 2025-01-06 PROCEDURE — 2500000001 HC RX 250 WO HCPCS SELF ADMINISTERED DRUGS (ALT 637 FOR MEDICARE OP): Performed by: STUDENT IN AN ORGANIZED HEALTH CARE EDUCATION/TRAINING PROGRAM

## 2025-01-06 PROCEDURE — 74230 X-RAY XM SWLNG FUNCJ C+: CPT

## 2025-01-06 PROCEDURE — 2500000004 HC RX 250 GENERAL PHARMACY W/ HCPCS (ALT 636 FOR OP/ED)

## 2025-01-06 PROCEDURE — 97162 PT EVAL MOD COMPLEX 30 MIN: CPT | Mod: GP

## 2025-01-06 PROCEDURE — 2500000005 HC RX 250 GENERAL PHARMACY W/O HCPCS: Performed by: SURGERY

## 2025-01-06 PROCEDURE — 2500000004 HC RX 250 GENERAL PHARMACY W/ HCPCS (ALT 636 FOR OP/ED): Performed by: STUDENT IN AN ORGANIZED HEALTH CARE EDUCATION/TRAINING PROGRAM

## 2025-01-06 PROCEDURE — 74230 X-RAY XM SWLNG FUNCJ C+: CPT | Performed by: RADIOLOGY

## 2025-01-06 PROCEDURE — 2020000001 HC ICU ROOM DAILY

## 2025-01-06 PROCEDURE — 92611 MOTION FLUOROSCOPY/SWALLOW: CPT | Mod: GN

## 2025-01-06 PROCEDURE — 37799 UNLISTED PX VASCULAR SURGERY: CPT

## 2025-01-06 PROCEDURE — 97530 THERAPEUTIC ACTIVITIES: CPT | Mod: GO

## 2025-01-06 PROCEDURE — 82947 ASSAY GLUCOSE BLOOD QUANT: CPT

## 2025-01-06 PROCEDURE — 97165 OT EVAL LOW COMPLEX 30 MIN: CPT | Mod: GO

## 2025-01-06 RX ORDER — CEFTRIAXONE 2 G/50ML
2 INJECTION, SOLUTION INTRAVENOUS EVERY 24 HOURS
Status: COMPLETED | OUTPATIENT
Start: 2025-01-07 | End: 2025-01-10

## 2025-01-06 RX ORDER — HYDROMORPHONE HYDROCHLORIDE 0.2 MG/ML
0.2 INJECTION INTRAMUSCULAR; INTRAVENOUS; SUBCUTANEOUS
Status: DISCONTINUED | OUTPATIENT
Start: 2025-01-06 | End: 2025-01-06

## 2025-01-06 RX ORDER — OXYCODONE HYDROCHLORIDE 5 MG/1
5 TABLET ORAL EVERY 6 HOURS PRN
Status: DISCONTINUED | OUTPATIENT
Start: 2025-01-06 | End: 2025-01-07

## 2025-01-06 RX ORDER — POLYETHYLENE GLYCOL 3350 17 G/17G
17 POWDER, FOR SOLUTION ORAL DAILY
Status: DISCONTINUED | OUTPATIENT
Start: 2025-01-06 | End: 2025-01-12 | Stop reason: HOSPADM

## 2025-01-06 RX ORDER — HEPARIN SODIUM 5000 [USP'U]/ML
5000 INJECTION, SOLUTION INTRAVENOUS; SUBCUTANEOUS EVERY 8 HOURS
Status: DISCONTINUED | OUTPATIENT
Start: 2025-01-06 | End: 2025-01-07

## 2025-01-06 RX ORDER — ATORVASTATIN CALCIUM 40 MG/1
40 TABLET, FILM COATED ORAL NIGHTLY
Status: DISCONTINUED | OUTPATIENT
Start: 2025-01-06 | End: 2025-01-12 | Stop reason: HOSPADM

## 2025-01-06 RX ORDER — DOCUSATE SODIUM 100 MG/1
100 CAPSULE, LIQUID FILLED ORAL 2 TIMES DAILY
Status: DISCONTINUED | OUTPATIENT
Start: 2025-01-06 | End: 2025-01-12 | Stop reason: HOSPADM

## 2025-01-06 RX ORDER — ACETAMINOPHEN 325 MG/1
650 TABLET ORAL EVERY 6 HOURS PRN
Status: DISCONTINUED | OUTPATIENT
Start: 2025-01-06 | End: 2025-01-07

## 2025-01-06 RX ORDER — MORPHINE SULFATE 4 MG/ML
4 INJECTION INTRAVENOUS EVERY 4 HOURS PRN
Status: DISCONTINUED | OUTPATIENT
Start: 2025-01-06 | End: 2025-01-07

## 2025-01-06 RX ORDER — PANTOPRAZOLE SODIUM 40 MG/1
40 TABLET, DELAYED RELEASE ORAL
Status: DISCONTINUED | OUTPATIENT
Start: 2025-01-07 | End: 2025-01-12 | Stop reason: HOSPADM

## 2025-01-06 RX ORDER — METOPROLOL TARTRATE 25 MG/1
12.5 TABLET, FILM COATED ORAL 2 TIMES DAILY
Status: DISCONTINUED | OUTPATIENT
Start: 2025-01-06 | End: 2025-01-06

## 2025-01-06 RX ORDER — AZITHROMYCIN 500 MG/1
500 TABLET, FILM COATED ORAL ONCE
Status: COMPLETED | OUTPATIENT
Start: 2025-01-07 | End: 2025-01-07

## 2025-01-06 RX ORDER — HYDROMORPHONE HYDROCHLORIDE 0.2 MG/ML
0.2 INJECTION INTRAMUSCULAR; INTRAVENOUS; SUBCUTANEOUS EVERY 4 HOURS PRN
Status: DISCONTINUED | OUTPATIENT
Start: 2025-01-06 | End: 2025-01-07

## 2025-01-06 RX ADMIN — MORPHINE SULFATE 4 MG: 4 INJECTION INTRAVENOUS at 10:11

## 2025-01-06 RX ADMIN — BARIUM SULFATE 20 ML: 400 SUSPENSION ORAL at 12:26

## 2025-01-06 RX ADMIN — HEPARIN SODIUM 5000 UNITS: 5000 INJECTION, SOLUTION INTRAVENOUS; SUBCUTANEOUS at 16:44

## 2025-01-06 RX ADMIN — BARIUM SULFATE 20 ML: 400 PASTE ORAL at 12:30

## 2025-01-06 RX ADMIN — ATORVASTATIN CALCIUM 40 MG: 40 TABLET, FILM COATED ORAL at 20:12

## 2025-01-06 RX ADMIN — AZITHROMYCIN 500 MG: 500 INJECTION, POWDER, LYOPHILIZED, FOR SOLUTION INTRAVENOUS at 05:23

## 2025-01-06 RX ADMIN — CEFTRIAXONE SODIUM 1 G: 1 INJECTION, SOLUTION INTRAVENOUS at 12:35

## 2025-01-06 RX ADMIN — INSULIN LISPRO 1 UNITS: 100 INJECTION, SOLUTION INTRAVENOUS; SUBCUTANEOUS at 08:07

## 2025-01-06 RX ADMIN — ACETAMINOPHEN 1000 MG: 10 INJECTION, SOLUTION INTRAVENOUS at 05:19

## 2025-01-06 RX ADMIN — ACETAMINOPHEN 1000 MG: 10 INJECTION, SOLUTION INTRAVENOUS at 00:20

## 2025-01-06 RX ADMIN — HYDROMORPHONE HYDROCHLORIDE 0.2 MG: 0.2 INJECTION, SOLUTION INTRAMUSCULAR; INTRAVENOUS; SUBCUTANEOUS at 20:12

## 2025-01-06 RX ADMIN — HYDROMORPHONE HYDROCHLORIDE 0.2 MG: 0.2 INJECTION, SOLUTION INTRAMUSCULAR; INTRAVENOUS; SUBCUTANEOUS at 12:36

## 2025-01-06 RX ADMIN — SODIUM CHLORIDE 75 ML/HR: 9 INJECTION, SOLUTION INTRAVENOUS at 05:19

## 2025-01-06 RX ADMIN — MORPHINE SULFATE 4 MG: 4 INJECTION INTRAVENOUS at 05:19

## 2025-01-06 RX ADMIN — MORPHINE SULFATE 4 MG: 4 INJECTION INTRAVENOUS at 15:06

## 2025-01-06 RX ADMIN — ACETAMINOPHEN 1000 MG: 10 INJECTION, SOLUTION INTRAVENOUS at 12:35

## 2025-01-06 RX ADMIN — INSULIN LISPRO 1 UNITS: 100 INJECTION, SOLUTION INTRAVENOUS; SUBCUTANEOUS at 12:36

## 2025-01-06 RX ADMIN — MORPHINE SULFATE 4 MG: 4 INJECTION INTRAVENOUS at 21:09

## 2025-01-06 RX ADMIN — DOCUSATE SODIUM 100 MG: 100 CAPSULE, LIQUID FILLED ORAL at 20:12

## 2025-01-06 RX ADMIN — INSULIN LISPRO 1 UNITS: 100 INJECTION, SOLUTION INTRAVENOUS; SUBCUTANEOUS at 16:44

## 2025-01-06 RX ADMIN — PANTOPRAZOLE SODIUM 40 MG: 40 INJECTION, POWDER, FOR SOLUTION INTRAVENOUS at 08:07

## 2025-01-06 RX ADMIN — BARIUM SULFATE 60 ML: 0.81 POWDER, FOR SUSPENSION ORAL at 12:25

## 2025-01-06 RX ADMIN — BARIUM SULFATE 30 ML: 400 SUSPENSION ORAL at 12:25

## 2025-01-06 ASSESSMENT — COGNITIVE AND FUNCTIONAL STATUS - GENERAL
TURNING FROM BACK TO SIDE WHILE IN FLAT BAD: A LITTLE
TOILETING: A LOT
STANDING UP FROM CHAIR USING ARMS: A LITTLE
DAILY ACTIVITIY SCORE: 20
MOBILITY SCORE: 17
WALKING IN HOSPITAL ROOM: A LITTLE
MOVING FROM LYING ON BACK TO SITTING ON SIDE OF FLAT BED WITH BEDRAILS: A LITTLE
HELP NEEDED FOR BATHING: A LITTLE
MOVING TO AND FROM BED TO CHAIR: A LITTLE
CLIMB 3 TO 5 STEPS WITH RAILING: A LOT
DRESSING REGULAR LOWER BODY CLOTHING: A LITTLE

## 2025-01-06 ASSESSMENT — PAIN - FUNCTIONAL ASSESSMENT

## 2025-01-06 ASSESSMENT — ACTIVITIES OF DAILY LIVING (ADL)
HOME_MANAGEMENT_TIME_ENTRY: 10
BATHING_ASSISTANCE: MINIMAL
ADL_ASSISTANCE: INDEPENDENT
ADL_ASSISTANCE: INDEPENDENT

## 2025-01-06 ASSESSMENT — PAIN SCALES - GENERAL
PAINLEVEL_OUTOF10: 6
PAINLEVEL_OUTOF10: 7
PAINLEVEL_OUTOF10: 6
PAINLEVEL_OUTOF10: 5 - MODERATE PAIN
PAINLEVEL_OUTOF10: 6
PAINLEVEL_OUTOF10: 3
PAINLEVEL_OUTOF10: 7
PAINLEVEL_OUTOF10: 6
PAINLEVEL_OUTOF10: 0 - NO PAIN
PAINLEVEL_OUTOF10: 8
PAINLEVEL_OUTOF10: 0 - NO PAIN
PAINLEVEL_OUTOF10: 9
PAINLEVEL_OUTOF10: 2
PAINLEVEL_OUTOF10: 9
PAINLEVEL_OUTOF10: 4

## 2025-01-06 NOTE — HOSPITAL COURSE
79M on xarelto for afib s/p mechanical fall down two steps when walking down them at 2AM. +head strike without LOC. Complained of neck pain, taken to [facility]. Pan scan notable for C6 fx with large spinal EDH. Grossly NVI since, moderate neck pain, no new numbness/tingling. Also started on CAP treatment, had dyspnea for past few days, new oxygen requirement. He was found to have RSV PNA. He was started empirically on ceftriaxone and azithromycin. Patient underwent C4-C6 laminectomy and epidural hematoma evacuation with neurosurgery spine team. Post-operatively patient was transferred to the ICU for MAP goals >75 and q1h neurochecks. Patient did well maintaining MAP goals. He continued to have stable q1 neurochecks. He advanced diet after formal swallow eval and MBS. Patient stable for transfer to [facility] 1/7. Spinal drain removed by NSGY 1/8. Patient to maintain c-collar at all times, will follow up outpatient in 2 and 6 weeks.  Xarelto held until postop day 14.  Seen by PT/OT who rec'd low intensity.

## 2025-01-06 NOTE — PROCEDURES
"Speech-Language Pathology      Inpatient Modified Barium Swallow Study    Patient Name: Naveen Arauz  MRN: [id number]  : 1945  Today's Date: 25  Time Calculation  Start Time: 1150  Stop Time: 1210  Time Calculation (min): 20 min        Modified Barium Swallow Study completed. Informed verbal consent obtained prior to completion of exam. Trials of thin, nectar/mildly thick liquid, honey/moderately thick liquid, puree, regular solids.       SLP: Nissa Lopez   Contact info: [employer] secure chat      History and Physical:    79M w/ epidural hematoma and C6 L lamina fx now s/p hematoma evacuation w/ NSGY. INR initially 2.6, was given Kcentra at OSH. On arrival, INR 1.4. Patient taken to OR w/ NSGY emergently. Post op, C collar in place. Patient is endorsing neck pain. No CP, SOB, N/V. No paresthesias. A&Ox4.       Relevant SLP Hx:  Pt w/ hx of oropharyngeal dysphagia per MBSS 2016 and swallow eval in 2018. Per chart, he completed SLP swallow therapy and advanced to regular texture diet. Pt also w/ hx of esophageal dysfunction and GERD per his report.       Subjective Presentation:  Pt received upright and alert in chair, w/ c-collar in place. Oriented x4. Willing to participate. Breathing on room air.      Pt reports having to \"go slow and careful w/ small pieces of food or \"thick\" food. Denies overt aspiration of liquids.     Pain:  Pt endorsing pain in neck and shoulders      DIET RECOMMENDATIONS:   - Regular (IDDSI Level 7)  - Mildly/nectar thick liquids (IDDSI Level 2)      STRATEGIES:  - Small, single sips  - Alternate consistencies  - Double swallow w/ solids      SLP PLAN:  Skilled SLP Services: Skilled SLP intervention for dysphagia is warranted.  SLP Frequency: 2x per week  Duration: 30 days  Treatment/Interventions:   - Oropharyngeal exercises  - Bolus trials  - Compensatory strategy training  - Diet tolerance/advancement  - Patient/caregiver education    Discussed POC: Patient  Discussed " Risks/Benefits: Yes  Patient/Caregiver Agreeable: Yes    Goals:  Patient/Family will verbalize/demonstrate comprehension of dysphagia education, strategies, recommendations and POC with 80% accuracy independently.              Start: 01/06/25, End: 02/06/25     Pt will accurately utilize/recall recommended swallow strategies/guidelines independently in >90% opportunities.    Start: 01/06/25, End: 02/06/25     Patient will complete respiratory muscle strength training (RMST) adhering to progressive overload protocol with cues as needed for accurate completion.   Start: 01/06/25, End: 02/06/25     Treatment:   Time in: 1340  Time out: 1358  Time calculation: 18 minutes    SLP provided extensive education and training to pt regarding physiology of swallow function, risk factors of aspiration/aspiration pna & how to mitigate factors, diet modifications, and the use of compensatory swallow strategies to promote pt safety upon PO intake. Written handout provided outlining recommendations.     Pt consumed trials of thickened liquids and regular solids, independently following recommended strategies. No overt difficulty or s/s aspiraiton noted w/ trials.       Pt agreeable to trial modified diet but expressed strong disinterest in thickened liquids from previous experiences.    Repeat Study: 1 week     Mechanics of the Swallow Summary:  ORAL PHASE:  Lip Closure - No labial escape/anterior loss of bolus   Tongue Control During Bolus Hold - Cohesive bolus between tongue to palatal seal   Bolus prep/mastication - Slow prolonged mastication with complete re-collection necessary   Bolus transport/lingual motion - Brisk tongue motion for A-P movement of the bolus   Oral residue - Complete oral clearance     PHARYNGEAL PHASE:  Initiation of pharyngeal swallow - Bolus head at posterior laryngeal surface of epiglottis   Soft palate elevation - No bolus between soft palate/pharyngeal wall   Laryngeal elevation - Partial superior  movement of thyroid cartilage and/or partial approximation of arytenoids to epiglottic petiole   Anterior hyoid excursion - Partial anterior movement   Epiglottic movement - Complete inversion    Laryngeal vestibule closure - Incomplete - narrow column of air/contrast in laryngeal vestibule   Pharyngeal stripping wave - Present, however, diminished   Pharyngeal contraction (A/P view) - Not tested       Pharyngoesophageal segment opening - Partial distension/partial duration with partial obstruction of flow of bolus   Tongue base retraction - Trace column of contrast or air between tongue base and pharyngeal wall   Pharyngeal residue - Collection of residue within or on the pharyngeal structures , moderate vallecular residues w/ puree/solids, mild-mod pyriform residues w/ solids 2/2 retrograde flow from proximal esophagus    ESOPHAGEAL PHASE:  Esophageal clearance - Esophageal retention with retrograde flow through the pharyngoesophageal segment , no significant residue in esophagus upon sweep after the test    Strategies attempted- Double swallow reduced pharyngeal residues w/ solids, cued small sips reduced amount of liquid penetrated during the swallow, cued throat clear reduced but did not eliminate residue from laryngeal vestibule     SLP Impressions with Severity Rating:   Pt presents with moderate pharyngeal dysphagia, suspect acute on chronic given hx of dysphagia. Swallowing physiology is detailed above. Impairments most impacting swallowing safety and efficiency include reduced hyolaryngeal excursion and UES opening. As a result pt at risk for aspiration of thin liquids, improved with thickened liquids. Pt is mobile and cognitively intact reducing risks of aspirated related complications, however given he is acutely compromised w/ PNA and new O2 requirements would recommend conservative management at this time. Recommend mildly thick liquids/regular solids w/ use of strategies to reduce risk of aspiration +  ongoing therapy targeting strengthening. Plan to repeat testing in 1-2 weeks.     OUTCOME MEASURES:  Functional Oral Intake Scale  Functional Oral Intake Scale: Level 5        total oral diet with multiple consistencies, but requires special preparations and compensations         Rosenbek's Penetration Aspiration Scale  Thin Liquids: 6. ASPIRATION that CLEARS with the swallow - contrast passes glottis, no subglottic residue  Nectar Thick Liquids: 3. PENETRATION with LOW ASPIRATION risk - contrast remains above vocal cords, visible residue]   Honey Thick Liquids: 3. PENETRATION with LOW ASPIRATION risk - contrast remains above vocal cords, visible residue]   Puree: 2. PENETRATION that CLEARS - contrast enter airway, above vocal cords, no residue  Solids: 2. PENETRATION that CLEARS - contrast enter airway, above vocal cords, no residue

## 2025-01-06 NOTE — PROGRESS NOTES
[facility]  TRAUMA SERVICE - PROGRESS NOTE    Patient Name: Naveen Arauz  MRN: [id number]  Admit Date: 105  : 1945  AGE: 79 y.o.   GENDER: male  ==============================================================================  MECHANISM OF INJURY:   79M on xarelto for afib (last taken last night 5:30 pm) s/p mechanical fall down two steps when talking down them at 2AM this morning. +head strike without LOC. Complained of neck pain, taken to [facility]. Pan scan notable for C6 fx with large spinal EDH. Grossly NVI since, moderate neck pain, no new numbness/tingling. Also started on CAP treatment, has had dyspnea for past few days, new oxygen requirement.     LOC (yes/no?): No  Anticoagulant / Anti-platelet Rx? (for what dx?): Xarelto  Referring Facility Name (N/A for scene EMR run): [facility]    INJURIES:   Nondisplaced C6 L lamina fx extending to articular facet  Large spinal epidural hematoma w/ severe cord compression/stenosis     OTHER MEDICAL PROBLEMS:  RSV CAP  HTN  HLD  COPD, not on home oxygen  Afib on xarelto  CAD s/p PCI mid RCA  TIA on ASA  Gout  GERD  BPH  Pulmonary HTN  Esophageal stricture  Lumbago    INCIDENTAL FINDINGS:  Lung nodule  Cholelithiasis  AAA    PROCEDURES:  C4-6 Laminectomy for epidural hematoma evacuation    ==============================================================================  TODAY'S ASSESSMENT AND PLAN OF CARE:   Large spinal EDH - s/p decompression  RSV PNA - supportive pulmonary care, azithro/ctx CAP  Swallow eval  AC restart plan per spine  C-spine precautions  SCDs, home ppi    Rob Govea MD  General Surgery, pgy4  Trauma [id number]    Patient discussed with attending.     ==============================================================================  CHIEF COMPLAINT / OVERNIGHT EVENTS:   naeo    MEDICAL HISTORY / ROS:  Admission history and ROS reviewed.     PHYSICAL EXAM:  Heart Rate:  []   Temp:  [36.2 °C (97.2 °F)-37.2  "°C (99 °F)]   Resp:  [15-25]   BP: (136-179)/(53-98)   Height:  [182 cm (5' 11.65\")]   Weight:  [81 kg (178 lb 9.2 oz)]   SpO2:  [90 %-99 %]   Nad  Sitting up in chair  Supplemental o2 via nc  IMAGING SUMMARY:   MRA neck:   Approximately 30-40% stenosis of the proximal right internal carotid  artery by NASCET criteria.  No significant stenosis of the left internal carotid artery.  Redemonstration of asymmetric smaller caliber of the right vertebral  artery, a normal variant. No evidence of flow cut off.  No evidence of dissection or pseudo aneurysm.    MRI C/T spine:  Redemonstration of extra-axial collection in the posterior aspect of  the spinal canal extending from the craniocervical junction to the  thoracic region. There is associated mass effect which in combination  with degenerative changes in the cervical region results in severe  spinal canal stenosis with cord compression at the level of C5-C6 and  additional moderate spinal canal stenosis at the levels of C3-C4,  C4-C5 and C6-C7. However, no significant changes in the cord signal.  The extra-axial collection does not result in significant spinal  canal stenosis in the thoracic spine. There are extensive signal  abnormalities in the paraspinal soft tissue of the posterior cervical  region with fluid signal intensity in the inter spinous space at the  level of the C5-C6 and C3-C4, which is concerning for ligamentous  injury. Multiple foci of paraspinous intramuscular hematoma are also  present. Additional ligamentous injury can not be excluded.  There is a small amount of fluid in the anterior prevertebral soft  tissue of the cervical region extending to the upper thoracic region.  Partially visualized marrow edema of the L1 vertebral body, likely in  the setting of acute fracture.    CTH: No acute intracranial abnormality or calvarial fracture.     CT C spine:   1. Large long segment acute epidural hematoma in the cervical and  thoracic spinal canal " beginning at the craniocervical junction and  measuring up to 0.8 cm in maximal thickness at C2 posteriorly. Result  in varying degrees of moderate to severe cord compression with  moderate cord compression at C2 and severe cord compression suspected  at C5-C6 due to combination of the circumferential epidural hematoma  and disc spur complex. MRI of the cervical and thoracic spine is  recommended for further evaluation.  2. Acute nondisplaced fracture of the left C6 lamina extending into  its inferior articulating process.    CT C/A/P:  1. Multifocal consolidative pneumonia within the bilateral lower  lobes and right middle lobe with diffuse airways wall thickening  concerning for endobronchial spread of infection. An aspiration  etiology is not excluded given the bibasilar predominant distribution  given the small amount of layering debris in the trachea and right  mainstem/lower lobe bronchus.  2. Mild diffuse mediastinal and bilateral hilar lymphadenopathy that  is likely at least in part reactive to the pneumonia and could be  chronic in the setting of emphysema as well.  3. Emphysema and a 0.3 cm right upper lobe pulmonary nodule.  Continued annual low-dose lung cancer screening is recommended.  4. Aneurysmal dilatation of the ascending aorta measuring 4.2 x 4.1  cm and mildly dilated main pulmonary artery that is suggestive of  pulmonary hypertension.  5. No acute pulmonary embolism.  6. Cholelithiasis without evidence of acute cholecystitis.  7. No acute traumatic injury in the chest, abdomen, or pelvis.    CT T/L spine: No acute fracture or traumatic malalignment     LABS:  Results from last 7 days   Lab Units 01/06/25  0205 01/05/25  1759 01/05/25  0157   WBC AUTO x10*3/uL 13.3* 12.9* 12.7*   HEMOGLOBIN g/dL 11.6* 12.3* 15.3   HEMATOCRIT % 34.1* 36.4* 45.4   PLATELETS AUTO x10*3/uL 132* 131* 142*   NEUTROS PCT AUTO %  --   --  67.2   LYMPHS PCT AUTO %  --   --  19.7   MONOS PCT AUTO %  --   --  9.2   EOS  PCT AUTO %  --   --  1.6     Results from last 7 days   Lab Units 01/06/25  0205 01/05/25  1759 01/05/25  1031   APTT seconds 31 33 36   INR  1.3* 1.4* 1.4*     Results from last 7 days   Lab Units 01/06/25  0205 01/05/25  1759 01/05/25  1045 01/05/25  0157   SODIUM mmol/L 135* 133* 131* 132*   POTASSIUM mmol/L 4.1 4.2 4.2 4.2   CHLORIDE mmol/L 97* 95* 94* 95*   CO2 mmol/L 28 26 27 27   BUN mg/dL 17 16 12 10   CREATININE mg/dL 0.73 0.82 0.78 0.79   CALCIUM mg/dL 8.7 8.5* 8.7 8.7   PROTEIN TOTAL g/dL  --   --  6.8 7.1   BILIRUBIN TOTAL mg/dL  --   --  0.8 0.8   ALK PHOS U/L  --   --  129 138*   ALT U/L  --   --  28 30   AST U/L  --   --  47* 37   GLUCOSE mg/dL 175* 191* 184* 155*     Results from last 7 days   Lab Units 01/05/25  1045 01/05/25  0157   BILIRUBIN TOTAL mg/dL 0.8 0.8     Results from last 7 days   Lab Units 01/06/25  0205 01/05/25  1800 01/05/25  1538   POCT PH, ARTERIAL pH 7.41 7.40 7.33*   POCT PCO2, ARTERIAL mm Hg 44* 42 52*   POCT PO2, ARTERIAL mm Hg 81* 65* 174*   POCT HCO3 CALCULATED, ARTERIAL mmol/L 27.9* 26.0 27.4*   POCT BASE EXCESS, ARTERIAL mmol/L 2.8 1.0 0.6       I have reviewed all medications, laboratory results, and imaging pertinent for today's encounter.

## 2025-01-06 NOTE — PROGRESS NOTES
"Naveen Arauz is a 79 y.o. male on day 1 of admission presenting with Epidural hematoma (Multi).    Subjective   Subjective improvement in strength and states numbness is better, does have worsening L hand burning paresthesias       Objective     Physical Exam  RUE D5, B5, T4, HG4+, IO4 (unable to properly assess IO due to known contractures)   LUE D5, B5, T4+, HG5, IO4 (unable to properly assess IO due to known contractures)   RLE HF 5, KE5, PF5, EHL5, DF5  LLE HF 5, KE5, PF5, EHL5, DF5   L hand burning paresthesias ow imprvd numbness in BUE    Last Recorded Vitals  Blood pressure (!) 159/91, pulse 74, temperature 37.2 °C (99 °F), temperature source Temporal, resp. rate 20, height 1.82 m (5' 11.65\"), weight 81 kg (178 lb 9.2 oz), SpO2 97%.  Intake/Output last 3 Shifts:  I/O last 3 completed shifts:  In: 1000 (12.3 mL/kg) [I.V.:1000 (12.3 mL/kg)]  Out: 250 (3.1 mL/kg) [Urine:250 (0.1 mL/kg/hr)]  Weight: 81 kg     Relevant Results                              Assessment/Plan   Assessment & Plan  Epidural hematoma (Multi)    Naveen is a 79 y.o. male with HTN, HLD, COPD, Afib (on Xarelto), CAD s/p PCI mid RCA (2015, on ASA), gout, GERD, BPH, p/w fall, +LOC, s/p kcentra, CTH neg, CT CTL epidural hematoma extending from craniocervical junction to C6, L C6 nondisplaced laminar fx, MRI C/T spine epidural hematoma with severe canal stenosis at C5-6 with moderate stenosis at C3-4, C4-5, C6-7     Trauma primary  Will discuss floor downgrade today  Maintain drain  Maintain collar  Encouraged to work with PTOT in collar  Okay for SQH  Will discuss ASA/xarelto restart plan           Guy Cuenca MD      "

## 2025-01-06 NOTE — PROGRESS NOTES
[facility]  TRAUMA SERVICE - PROGRESS NOTE    Patient Name: Naveen Arauz  MRN: [id number]  Admit Date: 105  : 1945  AGE: 79 y.o.   GENDER: male  ==============================================================================  MECHANISM OF INJURY:   79M on xarelto for afib (last taken last night 5:30 pm) s/p mechanical fall down two steps when talking down them at 2AM this morning. +head strike without LOC. Complained of neck pain, taken to [facility]. Pan scan notable for C6 fx with large spinal EDH. Grossly NVI since, moderate neck pain, no new numbness/tingling. Also started on CAP treatment, has had dyspnea for past few days, new oxygen requirement.     LOC (yes/no?): No  Anticoagulant / Anti-platelet Rx? (for what dx?): Xarelto  Referring Facility Name (N/A for scene EMR run): [facility]    INJURIES:   Nondisplaced C6 L lamina fx extending to articular facet  Large spinal epidural hematoma w/ severe cord compression/stenosis     OTHER MEDICAL PROBLEMS:  RSV CAP  HTN  HLD  COPD, not on home oxygen  Afib on xarelto  CAD s/p PCI mid RCA  TIA on ASA  Gout  GERD  BPH  Pulmonary HTN  Esophageal stricture  Lumbago    INCIDENTAL FINDINGS:  Lung nodule  Cholelithiasis  AAA    PROCEDURES:  C4-6 Laminectomy for epidural hematoma evacuation    ==============================================================================  TODAY'S ASSESSMENT AND PLAN OF CARE:  NEURO:   # Lumbago  # Hx TIA on ASA  # Nondisplaced C6 L lamina fx extending to articular facet  # Large spinal epidural hematoma w/ severe cord compression/stenosis   - Pain control with PO tylenol, PRN dilaudid 0.2, PRN morphine 4 mg (takes home oxycontin)  - q2h neuro checks  - PT/OT  - maintain collar  - maintain drain  - floor downgrade?   - Goal MAP>75    HEENT:  # Esophageal stricture  - Formal swallow study & MBS , passed OK for diet (regular diet and thickened liquids)     CV: HR , -174   # HTN  #  "HLD  # Afib on xarelto  # CAD s/p PCI mid RCA  - Not on pressors  - MAP goal >65  - hold metoprolol 50 mg qdaily   - restart atorvastatin  - hold home xarelto     PULM: 94-97, NC 5L   # COPD, not on home oxygen  # Pulmonary HTN  # RSV PNA  - IS  - DuoNeb prn  - CXR daily    GI: Bmx 1  # GERD  - Regular diet, thickened liquids per SLP   - Formal swallow study 1/6  - continue home pantoprazole (dextrapanprazole)     /FEN: UOP 1.05 L, Cr 0.73   # BPH  # Gout  # Hyponatremic 133->135 (improving)   - Replete electrolytes to potassium of 4.0, magnesium of 2.0.  - Remove mayo, TOV  - Hold home colchicine     HEME:  - F/U post op labs  - Holding DVT ppx and home ASA  - INR post op 1.4-> 1.3 (1/6)  - Hgb post op 12.3-> 11.6, plts 131-> 132  - Hematology consulted, appreciate recs  - OK to administer vit K if needed w/ INR recheck in 6 hrs w/ option to repeat Kcentra  - A line in place    ID:  - RSV PNA  - Bcx 1/5 [id number]  - WBC 13.3 from 12.9, Tm 37.2   - Continue azithromycin and ceftriaxone for 5 days (end 1/10)    ENDO:  - SSI    MSK/SKIN:  # C spine precautions  - Ambulate as tolerated  - PT/OT    F: Discontinued  E: Replete as needed   N: Regular diet, thickened liquids    GI ppx:  Home PPI    DVT ppx:  SCDs      LINES/DRAINS:  - A-line (remove)  - Accordion drain  - PIV x3  - Mayo (remove)     Dispo: TICU for q2h neurochecks    Patient discussed with attending physician, Dr. Viola Garay MD  PGY-1 General Surgery  TICU [id number]  ==============================================================================  CHIEF COMPLAINT / OVERNIGHT EVENTS:   Doing well this morning. States he has some pain in his L hand, able to mobilize with 5/5 strength. No other major complaints.     MEDICAL HISTORY / ROS:  Admission history and ROS reviewed.     PHYSICAL EXAM:  Heart Rate:  []   Temp:  [36.4 °C (97.5 °F)-37.2 °C (99 °F)]   Resp:  [16-26]   BP: (136-174)/(53-96)   Height:  [182 cm (5' 11.65\")]   Weight:  [81 kg " (178 lb 9.2 oz)]   SpO2:  [90 %-97 %]     Physical Exam  Vitals reviewed.   Constitutional:       General: He is not in acute distress.     Appearance: Normal appearance.      Interventions: Cervical collar and nasal cannula in place.   HENT:      Head: Normocephalic.      Comments: Accordion drain to surgical site, minimal sanguinous output  Some blood streaked across L scalp originating from surgical site.  Swelling of R cheek with small bruise.      Mouth/Throat:      Mouth: Mucous membranes are dry.      Pharynx: Oropharynx is clear.   Eyes:      Conjunctiva/sclera: Conjunctivae normal.      Pupils: Pupils are equal, round, and reactive to light.   Neck:      Comments: Aspen collar in place  Cardiovascular:      Rate and Rhythm: Normal rate and regular rhythm.      Pulses:           Radial pulses are 1+ on the right side and 1+ on the left side.   Pulmonary:      Effort: Pulmonary effort is normal. No respiratory distress or retractions.      Comments: 5L of NC   Abdominal:      General: There is no distension.      Palpations: Abdomen is soft.      Tenderness: There is no abdominal tenderness.   Neurological:      General: No focal deficit present.      Mental Status: He is alert and oriented to person, place, and time.      GCS: GCS eye subscore is 4. GCS verbal subscore is 5. GCS motor subscore is 6.      Cranial Nerves: No cranial nerve deficit.      Sensory: No sensory deficit.      Comments: BLE 5/5 strength on plantar/dorsiflexion  RUE 5/5  strength  LUE 4+/5  strength  Epidural in place   Psychiatric:         Thought Content: Thought content normal.         Judgment: Judgment normal.     IMAGING SUMMARY:   No new imaging    LABS:  Results from last 7 days   Lab Units 01/06/25  0205 01/05/25  1759 01/05/25  0157   WBC AUTO x10*3/uL 13.3* 12.9* 12.7*   HEMOGLOBIN g/dL 11.6* 12.3* 15.3   HEMATOCRIT % 34.1* 36.4* 45.4   PLATELETS AUTO x10*3/uL 132* 131* 142*   NEUTROS PCT AUTO %  --   --  67.2    LYMPHS PCT AUTO %  --   --  19.7   MONOS PCT AUTO %  --   --  9.2   EOS PCT AUTO %  --   --  1.6     Results from last 7 days   Lab Units 01/06/25  0205 01/05/25  1759 01/05/25  1031   APTT seconds 31 33 36   INR  1.3* 1.4* 1.4*     Results from last 7 days   Lab Units 01/06/25  0205 01/05/25  1759 01/05/25  1045 01/05/25  0157   SODIUM mmol/L 135* 133* 131* 132*   POTASSIUM mmol/L 4.1 4.2 4.2 4.2   CHLORIDE mmol/L 97* 95* 94* 95*   CO2 mmol/L 28 26 27 27   BUN mg/dL 17 16 12 10   CREATININE mg/dL 0.73 0.82 0.78 0.79   CALCIUM mg/dL 8.7 8.5* 8.7 8.7   PROTEIN TOTAL g/dL  --   --  6.8 7.1   BILIRUBIN TOTAL mg/dL  --   --  0.8 0.8   ALK PHOS U/L  --   --  129 138*   ALT U/L  --   --  28 30   AST U/L  --   --  47* 37   GLUCOSE mg/dL 175* 191* 184* 155*     Results from last 7 days   Lab Units 01/05/25  1045 01/05/25  0157   BILIRUBIN TOTAL mg/dL 0.8 0.8     Results from last 7 days   Lab Units 01/06/25  0205 01/05/25  1800 01/05/25  1538   POCT PH, ARTERIAL pH 7.41 7.40 7.33*   POCT PCO2, ARTERIAL mm Hg 44* 42 52*   POCT PO2, ARTERIAL mm Hg 81* 65* 174*   POCT HCO3 CALCULATED, ARTERIAL mmol/L 27.9* 26.0 27.4*   POCT BASE EXCESS, ARTERIAL mmol/L 2.8 1.0 0.6       I have reviewed all medications, laboratory results, and imaging pertinent for today's encounter.

## 2025-01-06 NOTE — PROGRESS NOTES
Occupational Therapy    Evaluation and Treatment    Patient Name: Naveen Arauz  MRN: [id number]  Today's Date: 1/6/2025  Room: 20/20  Time Calculation  Start Time: 1020  Stop Time: 1110  Time Calculation (min): 50 min    Assessment  IP OT Assessment  OT Assessment: Pt is a 79 year old male who demonstrates decreased strength, balance, activity tolerance, and coordination, which impedes occupational performance.  Prognosis: Good  Barriers to Discharge Home: No anticipated barriers  Evaluation/Treatment Tolerance: Patient tolerated treatment well  Medical Staff Made Aware: Yes  End of Session Communication: Bedside nurse  End of Session Patient Position: Up in chair, Alarm on    Plan:  Inpatient Plan  Treatment Interventions: ADL retraining, Functional transfer training, Endurance training, UE strengthening/ROM, Patient/family training, Equipment evaluation/education, Neuromuscular reeducation, Fine motor coordination activities, Compensatory technique education  OT Frequency: 4 times per week  OT Discharge Recommendations: Low intensity level of continued care  Equipment Recommended upon Discharge:  (TBD)  OT Recommended Transfer Status: Assist of 1, Minimal assist  OT - OK to Discharge: Yes  OT Assessment  OT Assessment Results: Decreased ADL status, Decreased upper extremity strength, Decreased fine motor control, Decreased endurance, Decreased functional mobility, Decreased sensation, Decreased IADLs  Prognosis: Good  Evaluation/Treatment Tolerance: Patient tolerated treatment well  Medical Staff Made Aware: Yes    Subjective   Current Problem:  1. Epidural hematoma (Multi)  Case Request Operating Room: C4-6 laminectomy for epidural hematoma    Case Request Operating Room: C4-6 laminectomy for epidural hematoma    BLOOD GAS ARTERIAL FULL PANEL    XR cervical spine 2-3 views      2. Fracture of unspecified parts of lumbosacral spine and pelvis, initial encounter for closed fracture (Multi)           General:  Reason for Referral: GLF down 2 steps, +head strike. INJURIES: Nondisplaced C6 L lamina fx extending to articular facet and Large spinal epidural hematoma w/ severe cord compression/stenosis. Now S/p C4-6 Laminectomy for epidural hematoma evacuation  Past Medical History Relevant to Rehab: 1. RSV CAP  2. HTN  3. HLD  4. COPD, not on home oxygen  5. Afib on xarelto  6. CAD s/p PCI mid RCA  7. TIA on ASA  8. Gout  9. GERD  10. BPH  11. Pulmonary HTN  12. Esophageal stricture  13. Lumbago  Prior to Session Communication: Bedside nurse  Patient Position Received: Up in chair, Alarm on  Family/Caregiver Present: No  General Comment: Pt seated in chair on arrival, pleasant and agreeable to participate. On 5 L NC.     Precautions:  Medical Precautions: Fall precautions  Post-Surgical Precautions: Spinal precautions  Braces Applied: C-collar at all times  Precautions Comment: droplet precautions, contact precautions, MAP >65    Vital Signs:   01/06/25 1020 01/06/25 1110   Vital Signs   Vitals Session Pre OT Post OT   Heart Rate 73 76   Resp  --  21   SpO2 96 % 97 %   /75 166/74   MAP (mmHg) 110 108   Vital Signs Comment  --  DURING: SBP ranged from 140s-high 170s with activity. Otherwise VSS         Pain:  Pain Assessment  Pain Assessment: 0-10  0-10 (Numeric) Pain Score: 7  Pain Type: Surgical pain  Pain Location: Neck  Pain Interventions: Repositioned, Therapeutic presence, Distraction, Ambulation/increased activity    Lines/Tubes/Drains:  Arterial Line 01/05/25 Left Radial (Active)   Number of days: 0       Closed/Suction Drain 1 Superior;Medial Back Accordion 10 Fr. (Active)   Number of days: 0       External Urinary Catheter (Active)   Number of days: 0         Objective   Cognition:  Overall Cognitive Status: Within Functional Limits  Orientation Level: Oriented X4     Confusion Assessment Method (CAM)  Acute Onset and Fluctuating Course (1A): No     Home Living:  Type of Home: House  Lives With:  Spouse  Home Adaptive Equipment: Cane  Home Layout: Two level, Bed/bath upstairs, Full bath main level  Home Access: Ramped entrance  Bathroom Shower/Tub: Walk-in shower (WIS 2nd floor, tub shower with handheld shower chair 1st floor.)     Prior Function:  Level of [place]: Independent with ADLs and functional transfers  ADL Assistance: Independent  Homemaking Assistance: Independent  Ambulatory Assistance: Independent  Vocational: Retired  Leisure: hunting, canoeing, chopping wood  Hand Dominance: Right  Prior Function Comments: (-) falls, drives     ADL:  Eating Assistance: Modified independent (Device)  Eating Deficit: Increased time to complete  Grooming Assistance: Modified independent (Device)  Grooming Deficit: Increased time to complete  Bathing Assistance: Minimal  UE Dressing Assistance: Modified independent (Device)  LE Dressing Assistance: Minimal  LE Dressing Deficit: Don/doff R sock, Don/doff L sock (with figure four technique)  Toileting Assistance with Device: Moderate  Toileting Deficit: Perineal hygiene    Activity Tolerance:  Endurance: Tolerates 10 - 20 min exercise with multiple rests  Early Mobility/Exercise Safety Screen: Proceed with mobilization - No exclusion criteria met    Bed Mobility/Transfers: Bed Mobility  Bed Mobility: No   and Transfers  Transfer: Yes  Transfer 1  Transfer From 1: Sit to  Transfer to 1: Stand  Transfer Level of Assistance 1: Contact guard  Trials/Comments 1: x4 trials total; 3 from chair and 1 from roilet with unilateral UE support on grab bar    Sensation:  Sensation Comment: Paresthesia left hand that pt reports has been steadily improving.    Strength:  Strength Comments: Right hand  4/5, left hand  5/5, otherwise UEs >/=3+/5 (resistance not applied).     Coordination:  Movements are Fluid and Coordinated: No  Upper Body Coordination: L hand bradykinesia, finger to thumb opposition mildly impaired bilaterally.      Extremities:   RUE   RUE :   (shoulder ROM limited at baseline ~90 degreees; reports need for rotator cuff surgery; shoulder internal rotation also limited; otherwise WFL),   LUE   LUE:  (shoulder ROM limited at baseline to ~120 degrees, reports need for rotator cuff surgery. Shoulder internal rotation also slightly limited.),      Treatment Completed on Evaluation    Activities of Daily Living:      Toileting  Toileting Level of Assistance: Moderate assistance  Where Assessed: Toilet  Toileting Comments: able to manage clothing with CGA, full assist for perineal hygiene. Mod A overall for task.    Therapy/Activity: Therapeutic Exercise  Therapeutic Exercise Performed: Yes  Therapeutic Exercise Activity 1: Pt provided with yellow theraputty and educated on bilateral hand HEP including composite grasp, thumb flexion, and finger to thumb opposition.   Therapeutic Activity  Therapeutic Activity Performed: Yes  Therapeutic Activity 1: Additional mobility exceeding OT evaluation performed in order to maximize therapeutic gains. Pt performed functional mobility from chair to door with CGA-occasional min A for small LOB and additional mobility to bathroom with CGA-occasional min A for LOB/unsteadiness. Tolerated ~1 minute static standing without UE support with CGA for assist with perineal hygiene prior to returning to chair.  Therapeutic Activity 2: Increased time educating pt on spinal precautions and compensatory strategies.    Outcome Measures: [facility] Daily Activity  Putting on and taking off regular lower body clothing: A little  Bathing (including washing, rinsing, drying): A little  Putting on and taking off regular upper body clothing: None  Toileting, which includes using toilet, bedpan or urinal: A lot  Taking care of personal grooming such as brushing teeth: None  Eating Meals: None  Daily Activity - Total Score: 20        ICU Mobility Screen  Early Mobility/Exercise Safety Screen: Proceed with mobilization - No exclusion criteria met  ICU  Mobility Scale: Walking with assistance of 1 person,          Education Documentation  Body Mechanics, taught by Yuliana Wong OT at 1/6/2025  2:11 PM.  Learner: Patient  Readiness: Acceptance  Method: Explanation, Demonstration  Response: Verbalizes Understanding, Demonstrated Understanding  Comment: OT goals/POC, precautions, compensatory ADL strategies, bilateral hand HEP    Precautions, taught by Yuliana Wong OT at 1/6/2025  2:11 PM.  Learner: Patient  Readiness: Acceptance  Method: Explanation, Demonstration  Response: Verbalizes Understanding, Demonstrated Understanding  Comment: OT goals/POC, precautions, compensatory ADL strategies, bilateral hand HEP    Home Exercise Program, taught by Yuliana Wong OT at 1/6/2025  2:11 PM.  Learner: Patient  Readiness: Acceptance  Method: Explanation, Demonstration  Response: Verbalizes Understanding, Demonstrated Understanding  Comment: OT goals/POC, precautions, compensatory ADL strategies, bilateral hand HEP    ADL Training, taught by Yuliana Wong OT at 1/6/2025  2:11 PM.  Learner: Patient  Readiness: Acceptance  Method: Explanation, Demonstration  Response: Verbalizes Understanding, Demonstrated Understanding  Comment: OT goals/POC, precautions, compensatory ADL strategies, bilateral hand HEP    Education Comments  No comments found.        Goals:   Encounter Problems       Encounter Problems (Active)       ADLs       Patient will perform UB and LB bathing with modified independent level of assistance.       Start:  01/06/25    Expected End:  01/20/25            Patient with complete lower body dressing with modified independent level of assistance donning and doffing all LE clothes  with PRN adaptive equipment       Start:  01/06/25    Expected End:  01/20/25            Patient will complete toileting including hygiene clothing management/hygiene with modified independent level of assistance.       Start:  01/06/25     Expected End:  01/20/25            Pt will complete simulated homemaking tasks, including laundry, cleaning, medication management, and simple meal prep, including item retrieval/transport tasks mod I without cueing to allow for safe return home to prior living environment        Start:  01/06/25    Expected End:  01/20/25               BALANCE       Pt will increase dynamic standing tolerance to >10 min with MI using LRAD during functional mobility/ADLs without LOB in order to improve activity tolerance and balance for self-care tasks.        Start:  01/06/25    Expected End:  01/20/25               COGNITION/SAFETY       Pt will maintain spinal precautions with 100% carryover during functional tasks, ADLs, and mobility without cueing.        Start:  01/06/25    Expected End:  01/20/25               MOBILITY       Patient will perform Functional mobility max Household distances with modified independent level of assistance and least restrictive device in order to improve safety and functional mobility.       Start:  01/06/25    Expected End:  01/20/25 01/06/25 at 2:13 PM   Yuliana Wong, OT   Rehab Office: [phone]

## 2025-01-06 NOTE — PROGRESS NOTES
Physical Therapy    Physical Therapy Evaluation    Patient Name: Naveen Arauz  MRN: [id number]  [place] [facility]  Room: 20/20-A  Today's Date: 1/6/2025   Time Calculation  Start Time: 0908  Stop Time: 0936  Time Calculation (min): 28 min    Assessment/Plan   PT Assessment  PT Assessment Results: Decreased strength, Decreased endurance, Impaired balance, Decreased mobility, Decreased coordination, Impaired sensation, Pain, Orthopedic restrictions  Rehab Prognosis: Excellent  Barriers to Discharge Home: No anticipated barriers  Evaluation/Treatment Tolerance: Patient limited by pain  Medical Staff Made Aware: Yes  Strengths: Coping skills, Premorbid level of function, Physical health, Housing layout, Ability to acquire knowledge  Barriers to Participation: Comorbidities  End of Session Communication: Bedside nurse  Assessment Comment: Pt primarily limited by pain and hypertension during PT evaluation. Pt required SBA-CGA for bed mobility, transfers, and short distance ambulation this session. Pt will benefit from continued skilled PT to address ongoing deficits.  End of Session Patient Position: Alarm on, Up in chair  IP OR SWING BED PT PLAN  Inpatient or Swing Bed: Inpatient  PT Plan  Treatment/Interventions: Bed mobility, Transfer training, Gait training, Stair training, Balance training, Strengthening, Endurance training, Range of motion, Therapeutic exercise, Therapeutic activity, Neuromuscular re-education, Positioning, Postural re-education, Home exercise program  PT Plan: Ongoing PT  PT Frequency: 5 times per week  PT Discharge Recommendations: Low intensity level of continued care  Equipment Recommended upon Discharge:  (TBD)  PT Recommended Transfer Status: Assist x1  PT - OK to Discharge: Yes    Subjective   General Visit Information:  General  Reason for Referral: GLF down 2 steps, +head strike. INJURIES: Nondisplaced C6 L lamina fx extending to articular facet and Large spinal epidural hematoma w/  severe cord compression/stenosis. Now S/p C4-6 Laminectomy for epidural hematoma evacuation  Past Medical History Relevant to Rehab: 1. RSV CAP  2. HTN  3. HLD  4. COPD, not on home oxygen  5. Afib on xarelto  6. CAD s/p PCI mid RCA  7. TIA on ASA  8. Gout  9. GERD  10. BPH  11. Pulmonary HTN  12. Esophageal stricture  13. Lumbago  Prior to Session Communication: Bedside nurse  Patient Position Received: Bed, 3 rail up, Alarm on  General Comment: Pt agreeable to therapy, supine in bed.  Home Living:  Home Living  Type of Home: House  Lives With: Spouse  Home Adaptive Equipment: Cane  Home Layout: Two level, Bed/bath upstairs, Full bath main level  Home Access: Ramped entrance  Bathroom Shower/Tub: Walk-in shower  Prior Level of Function:  Prior Function Per Pt/Caregiver Report  Level of [place]: Independent with ADLs and functional transfers  ADL Assistance: Independent  Homemaking Assistance: Independent  Ambulatory Assistance: Independent  Vocational: Retired  Leisure: hunting, canoeing, chopping wood  Hand Dominance: Right  Prior Function Comments: (-) falls, drives  Precautions:  Precautions  Post-Surgical Precautions: Spinal precautions  Splinting: C-collar donned  Precautions Comment: MAP >65       01/06/25 0908 01/06/25 0936   Vital Signs   Vitals Session Pre PT Post PT   Heart Rate 73 70   Resp 23 23   SpO2 97 % 93 %   /67 (!) 179/91   MAP (mmHg) 100 118        Objective   Pain:  Pain Assessment  Pain Assessment: 0-10  0-10 (Numeric) Pain Score: 9  Pain Type: Acute pain  Pain Location: Neck  Pain Orientation: Posterior  Pain Interventions: Repositioned  Cognition:  Cognition  Overall Cognitive Status: Within Functional Limits  Orientation Level: Oriented X4    General Assessments:    Activity Tolerance  Endurance: Tolerates 10 - 20 min exercise with multiple rests  Early Mobility/Exercise Safety Screen: Proceed with mobilization - No exclusion criteria met  Activity Tolerance Comments: pain  limiting, hypertensive with mobilty    Sensation  Light Touch:  (L hand paresthesias)    Strength  Strength Comments: BLE WFL  Strength  Strength Comments: BLE WFL    Perception  Inattention/Neglect: Appears intact      Coordination  Movements are Fluid and Coordinated: No  Upper Body Coordination: L hand bradykinesia    Postural Control  Postural Control: Within Functional Limits    Static Sitting Balance  Static Sitting-Balance Support: Feet supported  Static Sitting-Level of Assistance: Close supervision  Static Sitting-Comment/Number of Minutes: 5 min    Static Standing Balance  Static Standing-Balance Support: Bilateral upper extremity supported  Static Standing-Level of Assistance: Contact guard  Static Standing-Comment/Number of Minutes: 1 min  Functional Assessments:  Bed Mobility  Bed Mobility: Yes  Bed Mobility 1  Bed Mobility 1: Supine to sitting  Level of Assistance 1: Contact guard  Bed Mobility Comments 1: HOB elevated, cues for log roll    Transfers  Transfer: Yes  Transfer 1  Transfer From 1: Bed to, Stand to  Transfer to 1: Stand, Bed  Technique 1: Sit to stand, Stand to sit  Transfer Level of Assistance 1: Contact guard  Trials/Comments 1: x1    Ambulation/Gait Training  Ambulation/Gait Training Performed: Yes  Ambulation/Gait Training 1  Surface 1: Level tile  Device 1: No device  Assistance 1: Contact guard  Quality of Gait 1: Wide base of support  Comments/Distance (ft) 1: lateral pivoting steps bed to chair, further distance deferred 2/2 hypertension and pain    Stairs  Stairs: No  Extremity/Trunk Assessments:  RUE   RUE : Within Functional Limits  LUE   LUE: Exceptions to WFL (L hand/wrist deficits)  RLE   RLE : Within Functional Limits  LLE   LLE : Exceptions to WFL  Outcome Measures:  [facility] Basic Mobility  Turning from your back to your side while in a flat bed without using bedrails: A little  Moving from lying on your back to sitting on the side of a flat bed without using bedrails: A  little  Moving to and from bed to chair (including a wheelchair): A little  Standing up from a chair using your arms (e.g. wheelchair or bedside chair): A little  To walk in hospital room: A little  Climbing 3-5 steps with railing: A lot  Basic Mobility - Total Score: 17    FSS-ICU  Ambulation: Walks <50 feet with any assistance x1 or walks any distance with assistance x2 people  Rolling: Supervision or set-up only  Sitting: Supervision or set-up only  Transfer Sit-to-Stand: Supervision or set-up only  Transfer Supine-to-Sit: Supervision or set-up only  Total Score: 21      Early Mobility/Exercise Safety Screen: Proceed with mobilization - No exclusion criteria met  ICU Mobility Scale: Walking with assistance of 1 person [8]    Encounter Problems       Encounter Problems (Active)       Balance       Pt will score >24/28 on Tinetti indicating low fall risk        Start:  01/06/25    Expected End:  01/20/25               Mobility       STG - Patient will ambulate >150ft using LRAD Theresa       Start:  01/06/25    Expected End:  01/20/25            STG - Patient will ascend and descend a flight of stairs Theresa       Start:  01/06/25    Expected End:  01/20/25               PT Transfers       Pt will complete all functional transfers independently        Start:  01/06/25    Expected End:  01/20/25               Pain - Adult              Education Documentation  Precautions, taught by Keira Mike PT at 1/6/2025 11:40 AM.  Learner: Patient  Readiness: Acceptance  Method: Explanation  Response: Verbalizes Understanding  Comment: PT POC, D/C planning, spinal precautions    Body Mechanics, taught by Keira Mike PT at 1/6/2025 11:40 AM.  Learner: Patient  Readiness: Acceptance  Method: Explanation  Response: Verbalizes Understanding  Comment: PT POC, D/C planning, spinal precautions    Mobility Training, taught by Keira Mike PT at 1/6/2025 11:40 AM.  Learner: Patient  Readiness: Acceptance  Method:  Explanation  Response: Verbalizes Understanding  Comment: PT POC, D/C planning, spinal precautions    Education Comments  No comments found.      Keira Mike, PT

## 2025-01-06 NOTE — ED PROCEDURE NOTE
Procedure  Critical Care    Performed by: Hector Maloney MD  Authorized by: Hector Maloney MD    Critical care provider statement:     Critical care time (minutes):  33    Critical care time was exclusive of:  Separately billable procedures and treating other patients and teaching time    Critical care was necessary to treat or prevent imminent or life-threatening deterioration of the following conditions:  CNS failure or compromise and trauma    Critical care was time spent personally by me on the following activities:  Evaluation of patient's response to treatment, examination of patient, obtaining history from patient or surrogate, ordering and performing treatments and interventions, ordering and review of laboratory studies, ordering and review of radiographic studies, pulse oximetry, re-evaluation of patient's condition, discussions with consultants and review of old charts    Care discussed with: admitting provider                 Hector Maloney MD  01/06/25 104

## 2025-01-06 NOTE — PROGRESS NOTES
"    Adult SLP Clinical Swallow Evaluation    Patient Name: Naveen Arauz  MRN: [id number]  Today's Date: 1/6/2025   Time Calculation  Start Time: 0945  Stop Time: 1003  Time Calculation (min): 18 min       Recommendations:   NPO  Frequent oral care  May allow ice chips   Modified barium swallow study    Assessment:  Clinical swallow evaluation completed revealing c/f acute exacerbation of baseline dysphagia in setting of current hospitalization including risk for aspiration. Would recommend further eval and screen of oropharyngeal and esophageal swallow in order to determine least restrictive diet.      Plan:  SLP Plan: Skilled SLP  Discussed POC: Patient, Nursing, Physician  SLP - OK to Discharge: Yes      Goals:   Patient/Family will verbalize/demonstrate comprehension of dysphagia education, strategies, recommendations and POC with 80% accuracy independently.   Start: 01/06/25, End: 02/06/25       Subjective     History and Physical:    79M w/ epidural hematoma and C6 L lamina fx now s/p hematoma evacuation w/ NSGY. INR initially 2.6, was given Kcentra at OSH. On arrival, INR 1.4. Patient taken to OR w/ NSGY emergently. Post op, C collar in place. Patient is endorsing neck pain. No CP, SOB, N/V. No paresthesias. A&Ox4.      Relevant SLP Hx:  Pt w/ hx of oropharyngeal dysphagia per MBSS 2016 and swallow eval in 2018. Per chart, he completed SLP swallow therapy and advanced to regular texture diet. Pt also w/ hx of esophageal dysfunction and GERD per his report.      Subjective Presentation:  Pt received upright and alert in chair, w/ c-collar in place. Oriented x4. Willing to participate. Breathing on room air.     Pt reports having to \"go slow and careful w/ small pieces of food or \"thick\" food. Denies overt aspiration of liquids.     Objective     Oral/Motor Assessment:  Oral Hygiene: WFL  Dentition: Dentures (Lower Full), Dentures (Upper Full)  Oral Motor: Within Functional Limits  Facial Sensation: Within " Functional Limits  Facial Symmetry: Within Functional Limits  Intelligibility: Intelligible      Clinical Observations:  Textures Trialed: Thin Liquid   3oz Water Protocol Utilized: yes, fail    Pt consumed trials of ice chips, tsps water, and straw sips of water without anterior spillage and no s/s aspiration. Proceed to 3oz water assessment via straw sips which pt completed however had immediate cough after completion of sips, c/f aspiration. Trials ceased.      Inpatient Education:  Pt educated on result and recommendations. Pt indicated understanding.

## 2025-01-06 NOTE — CONSULTS
Name: Naveen Arauz  MRN: [id number]  Encounter Date: 1/6/2025  PCP: Armen Spangler DO  Heme-Onc: None    Reason for consult: Xarelto reversal   Attending Provider: Dr. Vallejo     Hematology/ Oncology Consult Note      History of Present Illness   79-year-old male with a history of paroxysmal atrial fibrillation (on Xarelto), CAD s/p PCI, chronic back pain, hypertension, and hyperlipidemia, presenting with a fall and significant trauma resulting in a large epidural hematoma (spanning from craniocervical junction to thoracic spine), a C6 lamina fracture, and severe cord compression. The patient’s INR was elevated at 2.6 on admission, and Kcentra was administered at 5am / dose of 4400 (50u/kg) as adnexxa was not available. Vitamin K 10mg IV was administered. [place] [facility] [place] 1/5/25.     Patient seen at bedside with family. He states he feels cold but otherwise feels well. He denies any bleeding.     Past Medical history     Past Medical History:   Diagnosis Date    A-fib (Multi)     Arrhythmia     Coronary artery disease     Gout     Hyperlipidemia     Hypertension     Transient cerebral ischemic attack, unspecified     TIA (transient ischemic attack)         Past Surgical History     Past Surgical History:   Procedure Laterality Date    CARDIAC CATHETERIZATION N/A 11/17/2023    Procedure: Left Heart Cath;  Surgeon: Manuel Blackwell MD;  Location: [facility] Cardiac Cath Lab;  Service: Cardiovascular;  Laterality: N/A;    MR HEAD ANGIO WO IV CONTRAST  11/4/2018    MR HEAD ANGIO WO IV CONTRAST 11/4/2018 [facility]    MR NECK ANGIO WO IV CONTRAST  11/4/2018    MR NECK ANGIO WO IV CONTRAST 11/4/2018 [facility]    OTHER SURGICAL HISTORY  11/19/2018    Stomach surgery    OTHER SURGICAL HISTORY  11/19/2018    Knee surgery    OTHER SURGICAL HISTORY  11/19/2018    Back surgery    OTHER SURGICAL HISTORY  11/19/2018    Cardiac catheterization with stent placement         Family  History      Family History   Problem Relation Name Age of Onset    Heart attack Mother      Heart attack Father      Heart attack Other Aunt          Social History     Social History     Socioeconomic History    Marital status:    Tobacco Use    Smoking status: Former     Current packs/day: 0.00     Types: Cigarettes     Quit date:      Years since quittin.0    Smokeless tobacco: Never   Substance and Sexual Activity    Alcohol use: Yes     Comment: Occasional    Drug use: Never    Sexual activity: Defer     Social Drivers of Health     Financial Resource Strain: Medium Risk (2025)    Overall Financial Resource Strain (CARDIA)     Difficulty of Paying Living Expenses: Somewhat hard   Food Insecurity: No Food Insecurity (2025)    Hunger Vital Sign     Worried About Running Out of Food in the Last Year: Never true     Ran Out of Food in the Last Year: Never true   Transportation Needs: No Transportation Needs (2025)    PRAPARE - Transportation     Lack of Transportation (Medical): No     Lack of Transportation (Non-Medical): No   Physical Activity: Sufficiently Active (2025)    Exercise Vital Sign     Days of Exercise per Week: 5 days     Minutes of Exercise per Session: 30 min   Intimate Partner Violence: Not At Risk (2025)    Humiliation, Afraid, Rape, and Kick questionnaire     Fear of Current or Ex-Partner: No     Emotionally Abused: No     Physically Abused: No     Sexually Abused: No   Housing Stability: Low Risk  (2025)    Housing Stability Vital Sign     Unable to Pay for Housing in the Last Year: No     Number of Times Moved in the Last Year: 0     Homeless in the Last Year: No         Allergies     Allergies   Allergen Reactions    Nsaids (Non-Steroidal Anti-Inflammatory Drug) GI Upset and Nausea/vomiting     Due to previous GI procedures no NSAID or Tylenol       Medications   acetaminophen, 1,000 mg, q6h VIET  atorvastatin, 40 mg, Nightly  [START ON 2025]  "azithromycin, 500 mg, Once  [START ON 1/7/2025] cefTRIAXone, 2 g, q24h  docusate sodium, 100 mg, BID  heparin, 5,000 Units, q8h  insulin lispro, 0-5 Units, TID AC  [START ON 1/7/2025] pantoprazole, 40 mg, Daily before breakfast  polyethylene glycol, 17 g, Daily      sodium chloride 0.9%, Last Rate: 75 mL/hr (01/06/25 0519)      acetaminophen, 650 mg, q6h PRN  dextrose, 12.5 g, q15 min PRN  dextrose, 25 g, q15 min PRN  glucagon, 1 mg, q15 min PRN  glucagon, 1 mg, q15 min PRN  HYDROmorphone, 0.2 mg, q4h PRN  ipratropium-albuteroL, 3 mL, q4h PRN  morphine, 4 mg, q4h PRN  oxyCODONE, 5 mg, q6h PRN        Review of Systems   Review of Systems   10 pt ROS reviewed and negative aside from above    Physical Exam   Blood pressure (!) 162/95, pulse 83, temperature 36.2 °C (97.2 °F), temperature source Temporal, resp. rate 24, height 1.82 m (5' 11.65\"), weight 81 kg (178 lb 9.2 oz), SpO2 95%.    Gen: awake, alert, in no acute distress  HEENT: C collar in place   CV: RRR, no m/r/g  Pulm: CTAB, without w/r/r  Abd: soft, NT/ND  Ext: no LE edema  Skin: warm and dry  Neuro: A&Ox3    Labs     Lab Results   Component Value Date    GLUCOSE 175 (H) 01/06/2025    CALCIUM 8.7 01/06/2025     (L) 01/06/2025    K 4.1 01/06/2025    CO2 28 01/06/2025    CL 97 (L) 01/06/2025    BUN 17 01/06/2025    CREATININE 0.73 01/06/2025       Lab Results   Component Value Date    WBC 13.3 (H) 01/06/2025    HGB 11.6 (L) 01/06/2025    HCT 34.1 (L) 01/06/2025    MCV 97 01/06/2025     (L) 01/06/2025       Lab Results   Component Value Date    ALT 28 01/05/2025    AST 47 (H) 01/05/2025    ALKPHOS 129 01/05/2025    BILITOT 0.8 01/05/2025       Imaging   Reviewed     Assessment/Plan     79-year-old male with a history of paroxysmal atrial fibrillation (on Xarelto), CAD s/p PCI, chronic back pain, hypertension, and hyperlipidemia, presenting with a fall and significant trauma resulting in a large epidural hematoma (spanning from craniocervical " junction to thoracic spine), a C6 lamina fracture, and severe cord compression. The patient’s INR was elevated at 2.6 on admission, and Kcentra was administered at 5am / dose of 4400 (50u/kg) as adnexxa was not available. Vitamin K 10mg IV was administered. Patient was transferred to [facility] [place] and underwent surgery 1/5/25.     Recommendations:   - would not give further reversal agents as bleeding has stopped/been controlled and due to risk of thrombosis     Thank you for this consult, we will sign-off. Patient seen and discussed with attending physician, Dr. Barker, who agrees with the above.     Wero Garay MD  Hematology-Oncology Fellow, PGY5  Hematology Consult Pager: [phone]  Oncology Consult Pager: [phone]

## 2025-01-07 ENCOUNTER — APPOINTMENT (OUTPATIENT)
Dept: CARDIOLOGY | Facility: HOSPITAL | Age: 80
End: 2025-01-07
Payer: MEDICARE

## 2025-01-07 LAB
ALBUMIN SERPL BCP-MCNC: 3.2 G/DL (ref 3.4–5)
ANION GAP SERPL CALC-SCNC: 14 MMOL/L (ref 10–20)
APTT PPP: 29 SECONDS (ref 27–38)
ATRIAL RATE: 74 BPM
BUN SERPL-MCNC: 13 MG/DL (ref 6–23)
CA-I BLD-SCNC: 1.14 MMOL/L (ref 1.1–1.33)
CALCIUM SERPL-MCNC: 8.6 MG/DL (ref 8.6–10.6)
CHLORIDE SERPL-SCNC: 100 MMOL/L (ref 98–107)
CO2 SERPL-SCNC: 28 MMOL/L (ref 21–32)
CREAT SERPL-MCNC: 0.62 MG/DL (ref 0.5–1.3)
EGFRCR SERPLBLD CKD-EPI 2021: >90 ML/MIN/1.73M*2
ERYTHROCYTE [DISTWIDTH] IN BLOOD BY AUTOMATED COUNT: 12.9 % (ref 11.5–14.5)
GLUCOSE BLD MANUAL STRIP-MCNC: 123 MG/DL (ref 74–99)
GLUCOSE BLD MANUAL STRIP-MCNC: 141 MG/DL (ref 74–99)
GLUCOSE BLD MANUAL STRIP-MCNC: 177 MG/DL (ref 74–99)
GLUCOSE SERPL-MCNC: 133 MG/DL (ref 74–99)
HCT VFR BLD AUTO: 34.8 % (ref 41–52)
HGB BLD-MCNC: 11.8 G/DL (ref 13.5–17.5)
INR PPP: 1.3 (ref 0.9–1.1)
MAGNESIUM SERPL-MCNC: 2.14 MG/DL (ref 1.6–2.4)
MCH RBC QN AUTO: 33.5 PG (ref 26–34)
MCHC RBC AUTO-ENTMCNC: 33.9 G/DL (ref 32–36)
MCV RBC AUTO: 99 FL (ref 80–100)
NRBC BLD-RTO: 0 /100 WBCS (ref 0–0)
P AXIS: 83 DEGREES
P OFFSET: 169 MS
P ONSET: 119 MS
PHOSPHATE SERPL-MCNC: 2.8 MG/DL (ref 2.5–4.9)
PLATELET # BLD AUTO: 125 X10*3/UL (ref 150–450)
POTASSIUM SERPL-SCNC: 4.2 MMOL/L (ref 3.5–5.3)
PR INTERVAL: 204 MS
PROTHROMBIN TIME: 14.9 SECONDS (ref 9.8–12.8)
Q ONSET: 221 MS
QRS COUNT: 12 BEATS
QRS DURATION: 80 MS
QT INTERVAL: 392 MS
QTC CALCULATION(BAZETT): 435 MS
QTC FREDERICIA: 420 MS
R AXIS: 67 DEGREES
RBC # BLD AUTO: 3.52 X10*6/UL (ref 4.5–5.9)
SODIUM SERPL-SCNC: 138 MMOL/L (ref 136–145)
T AXIS: 50 DEGREES
T OFFSET: 417 MS
VENTRICULAR RATE: 74 BPM
WBC # BLD AUTO: 12.1 X10*3/UL (ref 4.4–11.3)

## 2025-01-07 PROCEDURE — 82330 ASSAY OF CALCIUM: CPT

## 2025-01-07 PROCEDURE — 80069 RENAL FUNCTION PANEL: CPT

## 2025-01-07 PROCEDURE — 85610 PROTHROMBIN TIME: CPT

## 2025-01-07 PROCEDURE — 2500000001 HC RX 250 WO HCPCS SELF ADMINISTERED DRUGS (ALT 637 FOR MEDICARE OP): Performed by: STUDENT IN AN ORGANIZED HEALTH CARE EDUCATION/TRAINING PROGRAM

## 2025-01-07 PROCEDURE — 92526 ORAL FUNCTION THERAPY: CPT | Mod: GN

## 2025-01-07 PROCEDURE — 97116 GAIT TRAINING THERAPY: CPT | Mod: GP

## 2025-01-07 PROCEDURE — 97530 THERAPEUTIC ACTIVITIES: CPT | Mod: GP

## 2025-01-07 PROCEDURE — 1100000001 HC PRIVATE ROOM DAILY

## 2025-01-07 PROCEDURE — 2500000004 HC RX 250 GENERAL PHARMACY W/ HCPCS (ALT 636 FOR OP/ED)

## 2025-01-07 PROCEDURE — 2500000001 HC RX 250 WO HCPCS SELF ADMINISTERED DRUGS (ALT 637 FOR MEDICARE OP)

## 2025-01-07 PROCEDURE — 93005 ELECTROCARDIOGRAM TRACING: CPT

## 2025-01-07 PROCEDURE — 36415 COLL VENOUS BLD VENIPUNCTURE: CPT

## 2025-01-07 PROCEDURE — 85027 COMPLETE CBC AUTOMATED: CPT

## 2025-01-07 PROCEDURE — 2500000004 HC RX 250 GENERAL PHARMACY W/ HCPCS (ALT 636 FOR OP/ED): Performed by: STUDENT IN AN ORGANIZED HEALTH CARE EDUCATION/TRAINING PROGRAM

## 2025-01-07 PROCEDURE — 99291 CRITICAL CARE FIRST HOUR: CPT | Performed by: SURGERY

## 2025-01-07 PROCEDURE — 83735 ASSAY OF MAGNESIUM: CPT

## 2025-01-07 PROCEDURE — 82947 ASSAY GLUCOSE BLOOD QUANT: CPT

## 2025-01-07 RX ORDER — OXYCODONE HYDROCHLORIDE 10 MG/1
10 TABLET ORAL EVERY 6 HOURS PRN
Status: DISCONTINUED | OUTPATIENT
Start: 2025-01-07 | End: 2025-01-08

## 2025-01-07 RX ORDER — ACETAMINOPHEN 325 MG/1
650 TABLET ORAL EVERY 6 HOURS
Status: DISCONTINUED | OUTPATIENT
Start: 2025-01-07 | End: 2025-01-12 | Stop reason: HOSPADM

## 2025-01-07 RX ORDER — HYDROMORPHONE HYDROCHLORIDE 0.2 MG/ML
0.2 INJECTION INTRAMUSCULAR; INTRAVENOUS; SUBCUTANEOUS EVERY 4 HOURS PRN
Status: DISCONTINUED | OUTPATIENT
Start: 2025-01-07 | End: 2025-01-07

## 2025-01-07 RX ORDER — ENOXAPARIN SODIUM 100 MG/ML
30 INJECTION SUBCUTANEOUS 2 TIMES DAILY
Status: DISCONTINUED | OUTPATIENT
Start: 2025-01-07 | End: 2025-01-12 | Stop reason: HOSPADM

## 2025-01-07 RX ORDER — OXYCODONE HYDROCHLORIDE 5 MG/1
5 TABLET ORAL EVERY 4 HOURS PRN
Status: DISCONTINUED | OUTPATIENT
Start: 2025-01-07 | End: 2025-01-08

## 2025-01-07 RX ADMIN — ATORVASTATIN CALCIUM 40 MG: 40 TABLET, FILM COATED ORAL at 20:46

## 2025-01-07 RX ADMIN — HYDROMORPHONE HYDROCHLORIDE 0.2 MG: 0.2 INJECTION, SOLUTION INTRAMUSCULAR; INTRAVENOUS; SUBCUTANEOUS at 04:55

## 2025-01-07 RX ADMIN — HEPARIN SODIUM 5000 UNITS: 5000 INJECTION, SOLUTION INTRAVENOUS; SUBCUTANEOUS at 00:20

## 2025-01-07 RX ADMIN — PANTOPRAZOLE SODIUM 40 MG: 40 TABLET, DELAYED RELEASE ORAL at 06:26

## 2025-01-07 RX ADMIN — OXYCODONE HYDROCHLORIDE 10 MG: 10 TABLET ORAL at 15:51

## 2025-01-07 RX ADMIN — ENOXAPARIN SODIUM 30 MG: 100 INJECTION SUBCUTANEOUS at 20:46

## 2025-01-07 RX ADMIN — CEFTRIAXONE SODIUM 2 G: 2 INJECTION, SOLUTION INTRAVENOUS at 10:54

## 2025-01-07 RX ADMIN — MORPHINE SULFATE 4 MG: 4 INJECTION INTRAVENOUS at 02:09

## 2025-01-07 RX ADMIN — ACETAMINOPHEN 650 MG: 325 TABLET ORAL at 15:51

## 2025-01-07 RX ADMIN — HYDROMORPHONE HYDROCHLORIDE 0.2 MG: 0.2 INJECTION, SOLUTION INTRAMUSCULAR; INTRAVENOUS; SUBCUTANEOUS at 00:20

## 2025-01-07 RX ADMIN — AZITHROMYCIN DIHYDRATE 500 MG: 500 TABLET ORAL at 09:48

## 2025-01-07 RX ADMIN — OXYCODONE HYDROCHLORIDE 10 MG: 10 TABLET ORAL at 23:53

## 2025-01-07 RX ADMIN — POLYETHYLENE GLYCOL 3350 17 G: 17 POWDER, FOR SOLUTION ORAL at 08:41

## 2025-01-07 RX ADMIN — DOCUSATE SODIUM 100 MG: 100 CAPSULE, LIQUID FILLED ORAL at 08:41

## 2025-01-07 RX ADMIN — HEPARIN SODIUM 5000 UNITS: 5000 INJECTION, SOLUTION INTRAVENOUS; SUBCUTANEOUS at 06:30

## 2025-01-07 RX ADMIN — HYDROMORPHONE HYDROCHLORIDE 0.4 MG: 0.5 INJECTION, SOLUTION INTRAMUSCULAR; INTRAVENOUS; SUBCUTANEOUS at 18:05

## 2025-01-07 RX ADMIN — ACETAMINOPHEN 650 MG: 325 TABLET ORAL at 21:11

## 2025-01-07 RX ADMIN — HYDROMORPHONE HYDROCHLORIDE 0.2 MG: 0.2 INJECTION, SOLUTION INTRAMUSCULAR; INTRAVENOUS; SUBCUTANEOUS at 09:50

## 2025-01-07 RX ADMIN — MORPHINE SULFATE 4 MG: 4 INJECTION INTRAVENOUS at 08:51

## 2025-01-07 ASSESSMENT — PAIN - FUNCTIONAL ASSESSMENT
PAIN_FUNCTIONAL_ASSESSMENT: 0-10

## 2025-01-07 ASSESSMENT — PAIN SCALES - GENERAL
PAINLEVEL_OUTOF10: 3
PAINLEVEL_OUTOF10: 3
PAINLEVEL_OUTOF10: 8
PAINLEVEL_OUTOF10: 5 - MODERATE PAIN
PAINLEVEL_OUTOF10: 6
PAINLEVEL_OUTOF10: 9
PAINLEVEL_OUTOF10: 6
PAINLEVEL_OUTOF10: 9
PAINLEVEL_OUTOF10: 9
PAINLEVEL_OUTOF10: 7
PAINLEVEL_OUTOF10: 0 - NO PAIN
PAINLEVEL_OUTOF10: 7
PAINLEVEL_OUTOF10: 4
PAINLEVEL_OUTOF10: 8
PAINLEVEL_OUTOF10: 5 - MODERATE PAIN
PAINLEVEL_OUTOF10: 7
PAINLEVEL_OUTOF10: 5 - MODERATE PAIN
PAINLEVEL_OUTOF10: 8

## 2025-01-07 ASSESSMENT — COGNITIVE AND FUNCTIONAL STATUS - GENERAL
MOVING TO AND FROM BED TO CHAIR: A LITTLE
MOVING FROM LYING ON BACK TO SITTING ON SIDE OF FLAT BED WITH BEDRAILS: A LITTLE
MOBILITY SCORE: 17
STANDING UP FROM CHAIR USING ARMS: A LITTLE
WALKING IN HOSPITAL ROOM: A LITTLE
TURNING FROM BACK TO SIDE WHILE IN FLAT BAD: A LITTLE
CLIMB 3 TO 5 STEPS WITH RAILING: A LOT

## 2025-01-07 ASSESSMENT — PAIN SCALES - WONG BAKER: WONGBAKER_NUMERICALRESPONSE: HURTS LITTLE MORE

## 2025-01-07 ASSESSMENT — PAIN DESCRIPTION - ORIENTATION: ORIENTATION: POSTERIOR

## 2025-01-07 ASSESSMENT — PAIN DESCRIPTION - LOCATION: LOCATION: NECK

## 2025-01-07 NOTE — PROGRESS NOTES
"Naveen Arauz is a 79 y.o. male on day 2 of admission presenting with Epidural hematoma (Multi).    Subjective   Subjective improvement in strength and states numbness is better, does have worsening L hand burning paresthesias    Objective     Physical Exam  AOx3  RUE D5, B5, T4, HG4+, IO4 (unable to properly assess IO due to known contractures)   LUE D5, B5, T4, HG5, IO4 (unable to properly assess IO due to known contractures)   RLE HF 5, KE5, PF5, EHL5, DF5  LLE HF 5, KE5, PF5, EHL5, DF5   L hand burning paresthesias ow imprvd numbness in BUE  Wearing cervical collar     Last Recorded Vitals  Blood pressure 165/72, pulse 73, temperature 36.5 °C (97.7 °F), temperature source Temporal, resp. rate 18, height 1.82 m (5' 11.65\"), weight 81 kg (178 lb 9.2 oz), SpO2 99%.  Intake/Output last 3 Shifts:  I/O last 3 completed shifts:  In: 3535 (43.6 mL/kg) [I.V.:2785 (34.4 mL/kg); IV Piggyback:750]  Out: 1320 (16.3 mL/kg) [Urine:1300 (0.4 mL/kg/hr); Drains:20]  Weight: 81 kg     Relevant Results                              Assessment/Plan   Assessment & Plan  Epidural hematoma (Multi)    Naveen is a 79 y.o. male with HTN, HLD, COPD, Afib (on Xarelto), CAD s/p PCI mid RCA (2015, on ASA), gout, GERD, BPH, p/w fall, +LOC, s/p kcentra, CTH neg, CT CTL epidural hematoma extending from craniocervical junction to C6, L C6 nondisplaced laminar fx, MRI C/T spine epidural hematoma with severe canal stenosis at C5-6 with moderate stenosis at C3-4, C4-5, C6-7     Trauma primary  Ok to downgrade to floor and q2 neurochecks  MAP >65  Maintain drain, monitor output  Maintain collar  Encouraged to work with PTOT in collar  Ok to con't SQH, SCDs  Hold ASA/xarelto. Ok to restart ASA POD7 and ok to restart xarelto POD14  Will arrange 2 and 6 week outpatient follow ups            Rajan Garcia MD      "

## 2025-01-07 NOTE — PROGRESS NOTES
Physical Therapy    Physical Therapy Treatment    Patient Name: Naveen Arauz  MRN: [id number]  Department: [place] [id number]  Room: [facility]  Today's Date: 1/7/2025  Time Calculation  Start Time: 1144  Stop Time: 1210  Time Calculation (min): 26 min         Assessment/Plan   PT Assessment  End of Session Communication: Bedside nurse  Assessment Comment: Pt progressed to ambulating 35ft with CGA, no over LOB. Pt reporting increased neck pain this session, continues to benefit from skilled PT to address deficits.  End of Session Patient Position: Up in chair, Alarm on     PT Plan  Treatment/Interventions: Bed mobility, Transfer training, Gait training, Stair training, Balance training, Strengthening, Endurance training, Range of motion, Therapeutic exercise, Therapeutic activity, Neuromuscular re-education, Positioning, Postural re-education, Home exercise program  PT Plan: Ongoing PT  PT Frequency: 5 times per week  PT Discharge Recommendations: Low intensity level of continued care  Equipment Recommended upon Discharge:  (TBD)  PT Recommended Transfer Status: Assist x1  PT - OK to Discharge: Yes      General Visit Information:   PT  Visit  PT Received On: 01/07/25  General  Prior to Session Communication: Bedside nurse  Patient Position Received: Alarm off, not on at start of session, Bed, 3 rail up  General Comment: Pt agreeable to PT, on 4L NC    Subjective   Precautions:  Precautions  Medical Precautions: Fall precautions  Post-Surgical Precautions: Spinal precautions  Braces Applied: C-collar at all times  Precautions Comment: droplet precautions, contact precautions, MAP >65    Vital Signs (Past 2hrs)        Date/Time Vitals Session Patient Position Pulse Resp SpO2 BP MAP (mmHg)    01/07/25 1100 --  --  89  22  94 %  155/92  101     01/07/25 1144 Pre PT  --  75  24  96 %  168/92  111     01/07/25 1200 --  --  83  20  97 %  168/92  111                         Objective   Pain:  Pain Assessment  Pain Assessment:  0-10  0-10 (Numeric) Pain Score: 7  Pain Type: Surgical pain  Pain Location: Neck  Pain Orientation: Posterior  Pain Interventions: Medication (See MAR)  Response to Interventions: Decrease in pain  Cognition:  Cognition  Overall Cognitive Status: Within Functional Limits  Orientation Level: Oriented X4    Activity Tolerance:  Activity Tolerance  Endurance: Tolerates 10 - 20 min exercise with multiple rests  Early Mobility/Exercise Safety Screen: Proceed with mobilization - No exclusion criteria met  Treatments:      Balance/Neuromuscular Re-Education  Balance/Neuromuscular Re-Education Activity Performed: Yes  Balance/Neuromuscular Re-Education Activity 1: Standing balance: 30 seconds ea NBOS, eyes closed, nudges, tandem stance with CGA    Bed Mobility  Bed Mobility: Yes  Bed Mobility 1  Bed Mobility 1: Supine to sitting  Level of Assistance 1: Contact guard  Bed Mobility Comments 1: HOB elevated, log roll cueing    Ambulation/Gait Training  Ambulation/Gait Training Performed: Yes  Ambulation/Gait Training 1  Surface 1: Level tile  Device 1: No device  Assistance 1: Contact guard  Quality of Gait 1: Wide base of support  Comments/Distance (ft) 1: 35ft x 1, 5ft x 1  Transfers  Transfer: Yes  Transfer 1  Transfer From 1: Sit to  Transfer to 1: Stand  Technique 1: Sit to stand, Stand to sit  Transfer Level of Assistance 1: Contact guard  Trials/Comments 1: x3 trials from chair         Outcome Measures:  [facility] Basic Mobility  Turning from your back to your side while in a flat bed without using bedrails: A little  Moving from lying on your back to sitting on the side of a flat bed without using bedrails: A little  Moving to and from bed to chair (including a wheelchair): A little  Standing up from a chair using your arms (e.g. wheelchair or bedside chair): A little  To walk in hospital room: A little  Climbing 3-5 steps with railing: A lot  Basic Mobility - Total Score: 17           Early Mobility/Exercise Safety  Screen: Proceed with mobilization - No exclusion criteria met       Education Documentation  Precautions, taught by Keira Mike PT at 1/7/2025 12:45 PM.  Learner: Patient  Readiness: Acceptance  Method: Explanation  Response: Verbalizes Understanding  Comment: PT POC    Body Mechanics, taught by Keira Mike PT at 1/7/2025 12:45 PM.  Learner: Patient  Readiness: Acceptance  Method: Explanation  Response: Verbalizes Understanding  Comment: PT POC    Mobility Training, taught by Keira Mike PT at 1/7/2025 12:45 PM.  Learner: Patient  Readiness: Acceptance  Method: Explanation  Response: Verbalizes Understanding  Comment: PT POC    Education Comments  No comments found.        OP EDUCATION:       Encounter Problems       Encounter Problems (Active)       Balance       Pt will score >24/28 on Tinetti indicating low fall risk  (Progressing)       Start:  01/06/25    Expected End:  01/20/25               Mobility       STG - Patient will ambulate >150ft using LRAD Theresa (Progressing)       Start:  01/06/25    Expected End:  01/20/25            STG - Patient will ascend and descend a flight of stairs Theresa (Progressing)       Start:  01/06/25    Expected End:  01/20/25               PT Transfers       Pt will complete all functional transfers independently  (Progressing)       Start:  01/06/25    Expected End:  01/20/25               Pain - Adult            Keira Mike PT

## 2025-01-07 NOTE — PROGRESS NOTES
Inpatient SLP Swallow Treatment     Patient Name: Naveen Arauz  MRN: [id number]  Today's Date: 1/7/2025  Time Calculation  Start Time: 1100  Stop Time: 1113  Time Calculation (min): 13 min         Recommendations:   Regular (IDDSI Level 7)  Mildly/nectar thick liquids (IDDSI Level 2)    - Small, single sips  - Alternate consistencies  - Double swallow w/ solids   -Frequent oral care    SLP Assessment:  Pt following recommended strategies in context of PO trials, demonstrating no overt difficulty or s/s aspiration w/ recommended textures. Continue plan of care, will continue to assess readiness for repeat assessment as clinical status continues to improve.      Plan:  Skilled SLP Services: Skilled SLP intervention for dysphagia is warranted.  SLP Frequency: 2x per week  Duration: 30 days  Discussed POC: Patient, Nursing, Physician  SLP - OK to Discharge: Yes    Goals:   Patient/Family will verbalize/demonstrate comprehension of dysphagia education, strategies, recommendations and POC with 80% accuracy independently.              Start: 01/06/25, End: 02/06/25      Pt will accurately utilize/recall recommended swallow strategies/guidelines independently in >90% opportunities.               Start: 01/06/25, End: 02/06/25      Patient will complete respiratory muscle strength training (RMST) adhering to progressive overload protocol with cues as needed for accurate completion.              Start: 01/06/25, End: 02/06/25       Subjective     Pt upright and alert, willing to participate in therapy. Aspen collar in place. Breathing on supplemental O2.     Pt reports that he has difficulty chewing w/ the hard collar bracing his jaw, but this improves when he slightly tugs it downward to allow space for mandibular ROM. Pt denies difficulty otherwise.       Objective       Therapeutic Swallow Therapy:     Briefly re-educated pt on recommended strategies which he stated he has been utilizing.     Pt then independently  utilized strategies in context of trials of regular solid (omelette) and mildly thick juice via straw. Pt endorsed improved ease of mastication when positioned upright. No overt difficulty or s/s aspiration noted. Pt denied perceived difficulty.     Inpatient Education:  Pt educated on result and recommendations. Pt indicated understanding.

## 2025-01-07 NOTE — PROGRESS NOTES
[facility]  TRAUMA SERVICE - PROGRESS NOTE    Patient Name: Naveen Arauz  MRN: [id number]  Admit Date: 105  : 1945  AGE: 79 y.o.   GENDER: male  ==============================================================================  MECHANISM OF INJURY:   79M on xarelto for afib (last taken last night 5:30 pm) s/p mechanical fall down two steps when talking down them at 2AM this morning. +head strike without LOC. Complained of neck pain, taken to [facility]. Pan scan notable for C6 fx with large spinal EDH. Grossly NVI since, moderate neck pain, no new numbness/tingling. Also started on CAP treatment, has had dyspnea for past few days, new oxygen requirement.     LOC (yes/no?): No  Anticoagulant / Anti-platelet Rx? (for what dx?): Xarelto  Referring Facility Name (N/A for scene EMR run): [facility]    INJURIES:   Nondisplaced C6 L lamina fx extending to articular facet  Large spinal epidural hematoma w/ severe cord compression/stenosis     OTHER MEDICAL PROBLEMS:  RSV CAP  HTN  HLD  COPD, not on home oxygen  Afib on xarelto  CAD s/p PCI mid RCA  TIA on ASA  Gout  GERD  BPH  Pulmonary HTN  Esophageal stricture  Lumbago    INCIDENTAL FINDINGS:  Lung nodule  Cholelithiasis  AAA    PROCEDURES:  C4-6 Laminectomy for epidural hematoma evacuation    ==============================================================================  TODAY'S ASSESSMENT AND PLAN OF CARE:  NEURO:   - pain less well controlled today, not getting oxy at scheduled times  - drain w/ 0 out, not likely saw some sero sang out put, just not a lot  # Lumbago  # Hx TIA on ASA  # Nondisplaced C6 L lamina fx extending to articular facet  # Large spinal epidural hematoma w/ severe cord compression/stenosis   - Pain control with PO tylenol, oxy PO 5/10 prn, PRN dilaudid 0.2,   - Stop morphine, home oxycontin has not been filled recently  - NSGY recs:  - Maintain C-collar  - Maintain epidural drain  - 2 and 6 wk outpatient w/  NSGY    HEENT:  # Esophageal stricture  - Formal swallow study & MBS 1/6, passed OK for diet (regular diet and thickened liquids)     CV:   # HTN  # HLD  # Afib on xarelto  # CAD s/p PCI mid RCA  - MAP goal >65  - Holding home metoprolol. Does not have hydral prn.   - Atorvastatin  - Hold home xarelto and ASA  - Restart ASA POD7 (1/12)  - Restart xarelto POD 14 (1/19)    PULM:  # COPD, not on home oxygen  # Pulmonary HTN  # RSV PNA  - Saturating >92% on 5L NC  - IS  - DuoNeb prn  - CXR daily    GI: Bmx 1  # GERD  # Acute on chronic pharyngeal dysphagia  - Regular diet, thickened liquids per SLP    - Repeat testing w/ SLP in 1-2 wks  - Formal swallow study 1/6  - Continue PPI (home dextrapanprazole)   - Bowel regimen    /FEN: UOP 1.05 L, Cr 0.73   # Hyponatremia, resolved  # BPH  # Gout  # Hyponatremic, resolved  - , down from 1L. Found to be retaining w/ PVR 400cc this afternoon. Able to urinate on second try w/ 300cc out. Straight cath not required.  - Cr and BUN WNL  - Replete electrolytes to potassium of 4.0, magnesium of 2.0.  - Hold home colchicine     HEME:  - INR 1.3, Hgb stable, plts drifting down  - Hematology s/o  - SQH for DVT ppx per NSGY    ID:  # RSV PNA  - Afebrile, leukocytosis improving  - RSV PNA  - Bcx 1/5 NGTDx1  - WBC 13.3 from 12.9, Tm 37.2   - Continue azithromycin and ceftriaxone for 5 days (end 1/10)    ENDO:  - SSI    MSK/SKIN:  # C spine precautions  - Ambulate as tolerated  - PT/OT rec'd home w/ home care    F: PO intake  E: Replete as needed   N: Regular diet, thickened liquids    GI ppx:  Home PPI    DVT ppx:  SCDs      LINES/DRAINS:  - Accordion drain  - PIV x3  - External catheter     Dispo: TICU for q2h neurochecks    Patient discussed with attending physician, Dr. Rod.    Lian Schaefer MD  PGY-1 General Surgery  TICU [id number]  ==============================================================================  CHIEF COMPLAINT / OVERNIGHT EVENTS:   NAEO. This morning endorses  neck pain and difficulty sleeping/eating with C collar in place. Otherwise doing well, working with PT, tolerating diet. No CP, SOB, N/V.    MEDICAL HISTORY / ROS:  Admission history and ROS reviewed.     PHYSICAL EXAM:  Heart Rate:  [70-83]   Temp:  [36.2 °C (97.2 °F)-36.8 °C (98.2 °F)]   Resp:  [15-25]   BP: (136-179)/(67-98)   SpO2:  [93 %-100 %]     Physical Exam  Vitals reviewed.   Constitutional:       General: He is not in acute distress.     Appearance: Normal appearance.      Interventions: Cervical collar and nasal cannula in place.   HENT:      Head: Normocephalic.      Comments: Accordion drain with minimal sanguinous output  Swelling of R cheek improved, small bruise.      Mouth/Throat:      Mouth: Mucous membranes are dry.      Pharynx: Oropharynx is clear.   Eyes:      Conjunctiva/sclera: Conjunctivae normal.      Pupils: Pupils are equal, round, and reactive to light.   Neck:      Comments: Aspen collar in place  Cardiovascular:      Rate and Rhythm: Normal rate and regular rhythm.      Pulses:           Radial pulses are 1+ on the right side and 1+ on the left side.   Pulmonary:      Effort: Pulmonary effort is normal. No respiratory distress or retractions.      Comments: 5L of NC   Abdominal:      General: There is no distension.      Palpations: Abdomen is soft.      Tenderness: There is no abdominal tenderness.   Neurological:      General: No focal deficit present.      Mental Status: He is alert and oriented to person, place, and time.      GCS: GCS eye subscore is 4. GCS verbal subscore is 5. GCS motor subscore is 6.      Cranial Nerves: No cranial nerve deficit.      Sensory: No sensory deficit.      Comments: BLE 5/5 strength on plantar/dorsiflexion  RUE 5/5  strength  LUE 5/5  strength   Psychiatric:         Thought Content: Thought content normal.         Judgment: Judgment normal.     IMAGING SUMMARY:   No new imaging    LABS:  Results from last 7 days   Lab Units 01/07/25  0029  01/06/25 0205 01/05/25 1759 01/05/25  0157   WBC AUTO x10*3/uL 12.1* 13.3* 12.9* 12.7*   HEMOGLOBIN g/dL 11.8* 11.6* 12.3* 15.3   HEMATOCRIT % 34.8* 34.1* 36.4* 45.4   PLATELETS AUTO x10*3/uL 125* 132* 131* 142*   NEUTROS PCT AUTO %  --   --   --  67.2   LYMPHS PCT AUTO %  --   --   --  19.7   MONOS PCT AUTO %  --   --   --  9.2   EOS PCT AUTO %  --   --   --  1.6     Results from last 7 days   Lab Units 01/07/25 0029 01/06/25 0205 01/05/25  1759   APTT seconds 29 31 33   INR  1.3* 1.3* 1.4*     Results from last 7 days   Lab Units 01/07/25 0029 01/06/25 0205 01/05/25 1759 01/05/25 1045 01/05/25  0157   SODIUM mmol/L 138 135* 133* 131* 132*   POTASSIUM mmol/L 4.2 4.1 4.2 4.2 4.2   CHLORIDE mmol/L 100 97* 95* 94* 95*   CO2 mmol/L 28 28 26 27 27   BUN mg/dL 13 17 16 12 10   CREATININE mg/dL 0.62 0.73 0.82 0.78 0.79   CALCIUM mg/dL 8.6 8.7 8.5* 8.7 8.7   PROTEIN TOTAL g/dL  --   --   --  6.8 7.1   BILIRUBIN TOTAL mg/dL  --   --   --  0.8 0.8   ALK PHOS U/L  --   --   --  129 138*   ALT U/L  --   --   --  28 30   AST U/L  --   --   --  47* 37   GLUCOSE mg/dL 133* 175* 191* 184* 155*     Results from last 7 days   Lab Units 01/05/25 1045 01/05/25  0157   BILIRUBIN TOTAL mg/dL 0.8 0.8     Results from last 7 days   Lab Units 01/06/25 0205 01/05/25  1800 01/05/25  1538   POCT PH, ARTERIAL pH 7.41 7.40 7.33*   POCT PCO2, ARTERIAL mm Hg 44* 42 52*   POCT PO2, ARTERIAL mm Hg 81* 65* 174*   POCT HCO3 CALCULATED, ARTERIAL mmol/L 27.9* 26.0 27.4*   POCT BASE EXCESS, ARTERIAL mmol/L 2.8 1.0 0.6     I have reviewed all medications, laboratory results, and imaging pertinent for today's encounter.    REASON FOR ADMISSION    TODAY'S EVENTS        This critically ill patient continues no longer continues  to be at-risk for clinically significant deterioration / failure due to the above mentioned dysfunctional, unstable organ systems.  I have personally identified and managed all complex critical care issues to prevent  aforementioned clinical deterioration.  Critical care time is spent at bedside and/or the immediate area and has included, but is not limited to, the review of diagnostic tests, labs, radiographs, serial assessments of hemodynamics, respiratory status, ventilatory management, and family updates.  Time spent in procedures and teaching are reported separately.     CRITICAL CARE TIME: 35 minutes TR to RAMIN Rod MD

## 2025-01-08 ENCOUNTER — APPOINTMENT (OUTPATIENT)
Dept: RADIOLOGY | Facility: HOSPITAL | Age: 80
DRG: 028 | End: 2025-01-08
Payer: MEDICARE

## 2025-01-08 LAB
ABO GROUP (TYPE) IN BLOOD: NORMAL
ALBUMIN SERPL BCP-MCNC: 3.3 G/DL (ref 3.4–5)
ANION GAP SERPL CALC-SCNC: 10 MMOL/L (ref 10–20)
ANTIBODY SCREEN: NORMAL
BUN SERPL-MCNC: 10 MG/DL (ref 6–23)
CALCIUM SERPL-MCNC: 8.8 MG/DL (ref 8.6–10.6)
CHLORIDE SERPL-SCNC: 99 MMOL/L (ref 98–107)
CO2 SERPL-SCNC: 29 MMOL/L (ref 21–32)
CREAT SERPL-MCNC: 0.6 MG/DL (ref 0.5–1.3)
EGFRCR SERPLBLD CKD-EPI 2021: >90 ML/MIN/1.73M*2
ERYTHROCYTE [DISTWIDTH] IN BLOOD BY AUTOMATED COUNT: 13 % (ref 11.5–14.5)
GLUCOSE BLD MANUAL STRIP-MCNC: 121 MG/DL (ref 74–99)
GLUCOSE BLD MANUAL STRIP-MCNC: 147 MG/DL (ref 74–99)
GLUCOSE BLD MANUAL STRIP-MCNC: 176 MG/DL (ref 74–99)
GLUCOSE BLD MANUAL STRIP-MCNC: 259 MG/DL (ref 74–99)
GLUCOSE SERPL-MCNC: 126 MG/DL (ref 74–99)
HCT VFR BLD AUTO: 37.4 % (ref 41–52)
HGB BLD-MCNC: 12.4 G/DL (ref 13.5–17.5)
MAGNESIUM SERPL-MCNC: 1.99 MG/DL (ref 1.6–2.4)
MCH RBC QN AUTO: 32.9 PG (ref 26–34)
MCHC RBC AUTO-ENTMCNC: 33.2 G/DL (ref 32–36)
MCV RBC AUTO: 99 FL (ref 80–100)
NRBC BLD-RTO: 0 /100 WBCS (ref 0–0)
PHOSPHATE SERPL-MCNC: 2.5 MG/DL (ref 2.5–4.9)
PLATELET # BLD AUTO: 126 X10*3/UL (ref 150–450)
POTASSIUM SERPL-SCNC: 3.9 MMOL/L (ref 3.5–5.3)
RBC # BLD AUTO: 3.77 X10*6/UL (ref 4.5–5.9)
RH FACTOR (ANTIGEN D): NORMAL
SODIUM SERPL-SCNC: 134 MMOL/L (ref 136–145)
WBC # BLD AUTO: 9.1 X10*3/UL (ref 4.4–11.3)

## 2025-01-08 PROCEDURE — 2500000001 HC RX 250 WO HCPCS SELF ADMINISTERED DRUGS (ALT 637 FOR MEDICARE OP)

## 2025-01-08 PROCEDURE — 99232 SBSQ HOSP IP/OBS MODERATE 35: CPT | Performed by: SURGERY

## 2025-01-08 PROCEDURE — 2500000004 HC RX 250 GENERAL PHARMACY W/ HCPCS (ALT 636 FOR OP/ED): Performed by: STUDENT IN AN ORGANIZED HEALTH CARE EDUCATION/TRAINING PROGRAM

## 2025-01-08 PROCEDURE — 2500000004 HC RX 250 GENERAL PHARMACY W/ HCPCS (ALT 636 FOR OP/ED)

## 2025-01-08 PROCEDURE — 85027 COMPLETE CBC AUTOMATED: CPT

## 2025-01-08 PROCEDURE — 36415 COLL VENOUS BLD VENIPUNCTURE: CPT

## 2025-01-08 PROCEDURE — 82947 ASSAY GLUCOSE BLOOD QUANT: CPT

## 2025-01-08 PROCEDURE — 71045 X-RAY EXAM CHEST 1 VIEW: CPT | Performed by: RADIOLOGY

## 2025-01-08 PROCEDURE — 86901 BLOOD TYPING SEROLOGIC RH(D): CPT

## 2025-01-08 PROCEDURE — 1100000001 HC PRIVATE ROOM DAILY

## 2025-01-08 PROCEDURE — 2500000002 HC RX 250 W HCPCS SELF ADMINISTERED DRUGS (ALT 637 FOR MEDICARE OP, ALT 636 FOR OP/ED)

## 2025-01-08 PROCEDURE — 2500000001 HC RX 250 WO HCPCS SELF ADMINISTERED DRUGS (ALT 637 FOR MEDICARE OP): Performed by: STUDENT IN AN ORGANIZED HEALTH CARE EDUCATION/TRAINING PROGRAM

## 2025-01-08 PROCEDURE — 97530 THERAPEUTIC ACTIVITIES: CPT | Mod: GP,CQ

## 2025-01-08 PROCEDURE — 71045 X-RAY EXAM CHEST 1 VIEW: CPT

## 2025-01-08 PROCEDURE — 80069 RENAL FUNCTION PANEL: CPT

## 2025-01-08 PROCEDURE — 83735 ASSAY OF MAGNESIUM: CPT

## 2025-01-08 RX ORDER — AZITHROMYCIN 500 MG/1
500 TABLET, FILM COATED ORAL
Status: COMPLETED | OUTPATIENT
Start: 2025-01-08 | End: 2025-01-10

## 2025-01-08 RX ORDER — METHOCARBAMOL 500 MG/1
500 TABLET, FILM COATED ORAL EVERY 8 HOURS SCHEDULED
Status: DISCONTINUED | OUTPATIENT
Start: 2025-01-08 | End: 2025-01-12 | Stop reason: HOSPADM

## 2025-01-08 RX ORDER — HYDROMORPHONE HYDROCHLORIDE 1 MG/ML
0.6 INJECTION, SOLUTION INTRAMUSCULAR; INTRAVENOUS; SUBCUTANEOUS EVERY 4 HOURS PRN
Status: DISCONTINUED | OUTPATIENT
Start: 2025-01-08 | End: 2025-01-12 | Stop reason: HOSPADM

## 2025-01-08 RX ORDER — OXYCODONE HYDROCHLORIDE 10 MG/1
10 TABLET ORAL EVERY 4 HOURS PRN
Status: DISCONTINUED | OUTPATIENT
Start: 2025-01-08 | End: 2025-01-09

## 2025-01-08 RX ADMIN — ENOXAPARIN SODIUM 30 MG: 100 INJECTION SUBCUTANEOUS at 09:32

## 2025-01-08 RX ADMIN — OXYCODONE HYDROCHLORIDE 10 MG: 10 TABLET ORAL at 18:22

## 2025-01-08 RX ADMIN — METHOCARBAMOL TABLETS 500 MG: 500 TABLET, COATED ORAL at 13:09

## 2025-01-08 RX ADMIN — PANTOPRAZOLE SODIUM 40 MG: 40 TABLET, DELAYED RELEASE ORAL at 09:32

## 2025-01-08 RX ADMIN — POLYETHYLENE GLYCOL 3350 17 G: 17 POWDER, FOR SOLUTION ORAL at 09:33

## 2025-01-08 RX ADMIN — ACETAMINOPHEN 650 MG: 325 TABLET ORAL at 15:48

## 2025-01-08 RX ADMIN — OXYCODONE HYDROCHLORIDE 10 MG: 10 TABLET ORAL at 09:32

## 2025-01-08 RX ADMIN — CEFTRIAXONE SODIUM 2 G: 2 INJECTION, SOLUTION INTRAVENOUS at 11:07

## 2025-01-08 RX ADMIN — HYDROMORPHONE HYDROCHLORIDE 0.6 MG: 1 INJECTION, SOLUTION INTRAMUSCULAR; INTRAVENOUS; SUBCUTANEOUS at 11:02

## 2025-01-08 RX ADMIN — METHOCARBAMOL TABLETS 500 MG: 500 TABLET, COATED ORAL at 20:18

## 2025-01-08 RX ADMIN — OXYCODONE HYDROCHLORIDE 10 MG: 10 TABLET ORAL at 14:13

## 2025-01-08 RX ADMIN — ATORVASTATIN CALCIUM 40 MG: 40 TABLET, FILM COATED ORAL at 20:18

## 2025-01-08 RX ADMIN — ACETAMINOPHEN 650 MG: 325 TABLET ORAL at 20:18

## 2025-01-08 RX ADMIN — DOCUSATE SODIUM 100 MG: 100 CAPSULE, LIQUID FILLED ORAL at 20:18

## 2025-01-08 RX ADMIN — DOCUSATE SODIUM 100 MG: 100 CAPSULE, LIQUID FILLED ORAL at 09:32

## 2025-01-08 RX ADMIN — HYDROMORPHONE HYDROCHLORIDE 0.6 MG: 1 INJECTION, SOLUTION INTRAMUSCULAR; INTRAVENOUS; SUBCUTANEOUS at 15:48

## 2025-01-08 RX ADMIN — AZITHROMYCIN DIHYDRATE 500 MG: 500 TABLET ORAL at 11:07

## 2025-01-08 RX ADMIN — ACETAMINOPHEN 650 MG: 325 TABLET ORAL at 09:32

## 2025-01-08 RX ADMIN — ENOXAPARIN SODIUM 30 MG: 100 INJECTION SUBCUTANEOUS at 20:18

## 2025-01-08 RX ADMIN — INSULIN LISPRO 3 UNITS: 100 INJECTION, SOLUTION INTRAVENOUS; SUBCUTANEOUS at 13:07

## 2025-01-08 ASSESSMENT — COGNITIVE AND FUNCTIONAL STATUS - GENERAL
HELP NEEDED FOR BATHING: A LITTLE
DRESSING REGULAR LOWER BODY CLOTHING: A LITTLE
DRESSING REGULAR LOWER BODY CLOTHING: A LITTLE
CLIMB 3 TO 5 STEPS WITH RAILING: A LOT
WALKING IN HOSPITAL ROOM: A LITTLE
DRESSING REGULAR UPPER BODY CLOTHING: A LITTLE
MOVING TO AND FROM BED TO CHAIR: A LITTLE
MOVING FROM LYING ON BACK TO SITTING ON SIDE OF FLAT BED WITH BEDRAILS: A LITTLE
DAILY ACTIVITIY SCORE: 18
TOILETING: A LITTLE
TURNING FROM BACK TO SIDE WHILE IN FLAT BAD: A LOT
EATING MEALS: A LITTLE
DAILY ACTIVITIY SCORE: 18
EATING MEALS: A LITTLE
PERSONAL GROOMING: A LITTLE
TOILETING: A LITTLE
MOVING FROM LYING ON BACK TO SITTING ON SIDE OF FLAT BED WITH BEDRAILS: A LITTLE
MOVING TO AND FROM BED TO CHAIR: A LITTLE
STANDING UP FROM CHAIR USING ARMS: A LITTLE
PERSONAL GROOMING: A LITTLE
DRESSING REGULAR UPPER BODY CLOTHING: A LITTLE
TURNING FROM BACK TO SIDE WHILE IN FLAT BAD: A LOT
MOBILITY SCORE: 17
STANDING UP FROM CHAIR USING ARMS: A LITTLE
STANDING UP FROM CHAIR USING ARMS: A LITTLE
MOVING TO AND FROM BED TO CHAIR: A LITTLE
CLIMB 3 TO 5 STEPS WITH RAILING: A LITTLE
HELP NEEDED FOR BATHING: A LITTLE
WALKING IN HOSPITAL ROOM: A LITTLE
MOVING FROM LYING ON BACK TO SITTING ON SIDE OF FLAT BED WITH BEDRAILS: A LITTLE
WALKING IN HOSPITAL ROOM: A LITTLE
CLIMB 3 TO 5 STEPS WITH RAILING: A LITTLE
TURNING FROM BACK TO SIDE WHILE IN FLAT BAD: A LITTLE

## 2025-01-08 ASSESSMENT — PAIN SCALES - GENERAL
PAINLEVEL_OUTOF10: 0 - NO PAIN
PAINLEVEL_OUTOF10: 9

## 2025-01-08 ASSESSMENT — PAIN - FUNCTIONAL ASSESSMENT
PAIN_FUNCTIONAL_ASSESSMENT: 0-10
PAIN_FUNCTIONAL_ASSESSMENT: 0-10

## 2025-01-08 NOTE — PROGRESS NOTES
This patient presented to [facility] ED as a limited trauma activation, after mechanical falling down steps. Patient did hit his head, but did not lose consciousness.  Patient +RSV, received imaging in the ED that revealed a spinal injury, etc. He admitted to the TSICU for airway protection and neuro monitoring for his c-spine injury.     Patient transferred to the floor (T8) today. He was evaluated by PT/OT and was recommended low intensity. Patient is not medically ready at this time, but will discharge home once medically cleared. SW will continue to follow to assist with the discharge plan, as needed.     MONICO Damico

## 2025-01-08 NOTE — CARE PLAN
The patient's goals for the shift include      The clinical goals for the shift include ambulate with PT    Over the shift, the patient did not make progress toward the following goals. Barriers to progression include . Recommendations to address these barriers include .    Problem: Fall/Injury  Goal: Not fall by end of shift  Outcome: Progressing  Goal: Be free from injury by end of the shift  Outcome: Progressing  Goal: Verbalize understanding of personal risk factors for fall in the hospital  Outcome: Progressing  Goal: Verbalize understanding of risk factor reduction measures to prevent injury from fall in the home  Outcome: Progressing  Goal: Use assistive devices by end of the shift  Outcome: Progressing  Goal: Pace activities to prevent fatigue by end of the shift  Outcome: Progressing     Problem: Skin  Goal: Decreased wound size/increased tissue granulation at next dressing change  Outcome: Progressing  Goal: Participates in plan/prevention/treatment measures  Outcome: Progressing  Goal: Prevent/manage excess moisture  Outcome: Progressing  Goal: Prevent/minimize sheer/friction injuries  Outcome: Progressing  Goal: Promote/optimize nutrition  Outcome: Progressing  Goal: Promote skin healing  Outcome: Progressing

## 2025-01-08 NOTE — PROGRESS NOTES
[facility]  TRAUMA SERVICE - PROGRESS NOTE    Patient Name: Naveen Arauz  MRN: [id number]  Admit Date: 105  : 1945  AGE: 79 y.o.   GENDER: male  ==============================================================================  MECHANISM OF INJURY:   79M on xarelto for afib (last taken last night 5:30 pm) s/p mechanical fall down two steps when talking down them at 2AM this morning. +head strike without LOC. Complained of neck pain, taken to [facility]. Pan scan notable for C6 fx with large spinal EDH. Grossly NVI since, moderate neck pain, no new numbness/tingling. Also started on CAP treatment, has had dyspnea for past few days, new oxygen requirement.      LOC (yes/no?): No  Anticoagulant / Anti-platelet Rx? (for what dx?): Xarelto  Referring Facility Name (N/A for scene EMR run): [facility]     INJURIES:   Nondisplaced C6 L lamina fx extending to articular facet  Large spinal epidural hematoma w/ severe cord compression/stenosis      OTHER MEDICAL PROBLEMS:  RSV CAP  HTN  HLD  COPD, not on home oxygen  Afib on xarelto  CAD s/p PCI mid RCA  TIA on ASA  Gout  GERD  BPH  Pulmonary HTN  Esophageal stricture  Lumbago     INCIDENTAL FINDINGS:  Lung nodule  Cholelithiasis  AAA     PROCEDURES:  C4-6 Laminectomy for epidural hematoma evacuation    ==============================================================================  TODAY'S ASSESSMENT AND PLAN OF CARE:    #C6 lamina fracture #Spinal epidural hematoma  - s/p 1.5 leminectomy with hematoma evacuation  - Epidural drain in place. Possible removal by NSGY   - Maintain cervical collar for 6 weeks     #Analgesia  - Scheduled tylenol, Oxy 5/10 prn, Dilaudid breakthrough  - Adding Robaxin  and increasing analgesic dosages    #GI #Esophageal stricture #GERD  - Formal swallow study & MBS , passed OK for diet (regular diet and thickened liquids). Repeat testing w/ SLP in 1-2 wks  - Pantoprazole  - Bowel regimen    #CV #HLD  #Afib #CAD s/p PCI mid RCA #h/o TIA  - Restart Aspirin POD#7, Restart Xarelto POD#14  - MAP goal >65  - Holding home metoprolol  - Atorvastatin    #COPD (not on home O2) #Pulmonary HTN #RSV PNA  - >92% on 5L NC  - DuoNeb prn  - CXR daily  - Bcx 1/5 NGTDx1  - Continue azithromycin and ceftriaxone for 5 days (end 1/10)    # #Hyponatremia, resolved #BPH  - , down from 1L. Found to be retaining w/ PVR 400cc this afternoon. Able to urinate on second try w/ 300cc out. Straight cath not required.  - Cr and BUN WNL  - Replete electrolytes to potassium of 4.0, magnesium of 2.0.    # Gout  - Hold home colchicine     #Heme  - INR 1.3, Hgb stable, plts drifting down  - Hematology s/o    #Endocrine  - Sliding scale insulin    #PT/OT  - Home care recommended    #DVTppx  - SCDs  - Enoxaparin     LINES/DRAINS:  - Accordion drain  - PIV x3  - External catheter      #Dispo  - 2 and 6 wk outpatient w/ NSGY    ==============================================================================  CHIEF COMPLAINT / OVERNIGHT EVENTS:   No overnight events on signout. Patient reports poor pain control, complaining about nursing not seeing him frequently enough with pain medication.     MEDICAL HISTORY / ROS:  Admission history and ROS reviewed.    PHYSICAL EXAM:  Heart Rate:  [75-97]   Temp:  [36.2 °C (97.2 °F)-36.8 °C (98.2 °F)]   Resp:  [18-29]   BP: (146-177)/(84-99)   SpO2:  [92 %-97 %]     Physical Exam  Vitals reviewed.   Constitutional:       General: He is not in acute distress.     Appearance: Normal appearance. He is not ill-appearing, toxic-appearing or diaphoretic.   HENT:      Head: Normocephalic and atraumatic.      Right Ear: External ear normal.      Left Ear: External ear normal.      Nose: No rhinorrhea.   Eyes:      General:         Right eye: No discharge.         Left eye: No discharge.      Extraocular Movements: Extraocular movements intact.      Conjunctiva/sclera: Conjunctivae normal.   Neck:      Comments: In  cervical collar  Cardiovascular:      Rate and Rhythm: Normal rate and regular rhythm.      Heart sounds: Normal heart sounds. No murmur heard.  Pulmonary:      Effort: Pulmonary effort is normal. No respiratory distress.      Breath sounds: No stridor. Rhonchi present.   Chest:      Chest wall: No tenderness.   Abdominal:      General: There is no distension.      Palpations: Abdomen is soft.      Tenderness: There is no guarding.   Musculoskeletal:      Cervical back: Neck supple. No tenderness.      Right lower leg: No edema.      Left lower leg: No edema.   Skin:     General: Skin is warm and dry.      Capillary Refill: Capillary refill takes less than 2 seconds.      Findings: No erythema or rash.   Neurological:      General: No focal deficit present.      Mental Status: He is alert.   Psychiatric:         Mood and Affect: Mood normal.         Behavior: Behavior normal.         IMAGING SUMMARY:  no new imaging    LABS:  Results from last 7 days   Lab Units 01/07/25 0029 01/06/25 0205 01/05/25 1759 01/05/25  0157   WBC AUTO x10*3/uL 12.1* 13.3* 12.9* 12.7*   HEMOGLOBIN g/dL 11.8* 11.6* 12.3* 15.3   HEMATOCRIT % 34.8* 34.1* 36.4* 45.4   PLATELETS AUTO x10*3/uL 125* 132* 131* 142*   NEUTROS PCT AUTO %  --   --   --  67.2   LYMPHS PCT AUTO %  --   --   --  19.7   MONOS PCT AUTO %  --   --   --  9.2   EOS PCT AUTO %  --   --   --  1.6     Results from last 7 days   Lab Units 01/07/25 0029 01/06/25 0205 01/05/25  1759   APTT seconds 29 31 33   INR  1.3* 1.3* 1.4*     Results from last 7 days   Lab Units 01/07/25  0029 01/06/25  0205 01/05/25 1759 01/05/25  1045 01/05/25  0157   SODIUM mmol/L 138 135* 133* 131* 132*   POTASSIUM mmol/L 4.2 4.1 4.2 4.2 4.2   CHLORIDE mmol/L 100 97* 95* 94* 95*   CO2 mmol/L 28 28 26 27 27   BUN mg/dL 13 17 16 12 10   CREATININE mg/dL 0.62 0.73 0.82 0.78 0.79   CALCIUM mg/dL 8.6 8.7 8.5* 8.7 8.7   PROTEIN TOTAL g/dL  --   --   --  6.8 7.1   BILIRUBIN TOTAL mg/dL  --   --   --  0.8  0.8   ALK PHOS U/L  --   --   --  129 138*   ALT U/L  --   --   --  28 30   AST U/L  --   --   --  47* 37   GLUCOSE mg/dL 133* 175* 191* 184* 155*     Results from last 7 days   Lab Units 01/05/25  1045 01/05/25  0157   BILIRUBIN TOTAL mg/dL 0.8 0.8     Results from last 7 days   Lab Units 01/06/25  0205 01/05/25  1800 01/05/25  1538   POCT PH, ARTERIAL pH 7.41 7.40 7.33*   POCT PCO2, ARTERIAL mm Hg 44* 42 52*   POCT PO2, ARTERIAL mm Hg 81* 65* 174*   POCT HCO3 CALCULATED, ARTERIAL mmol/L 27.9* 26.0 27.4*   POCT BASE EXCESS, ARTERIAL mmol/L 2.8 1.0 0.6       I have reviewed all medications, laboratory results, and imaging pertinent for today's encounter.

## 2025-01-08 NOTE — PROGRESS NOTES
"Naveen Arauz is a 79 y.o. male on day 3 of admission presenting with Epidural hematoma (Multi).    Subjective   No acute events    Objective     Physical Exam  AOx3  RUE D5, B5, T4, HG4+, IO4 (unable to properly assess IO due to known contractures)   LUE D5, B5, T4, HG5, IO4 (unable to properly assess IO due to known contractures)   RLE HF 5, KE5, PF5, EHL5, DF5  LLE HF 5, KE5, PF5, EHL5, DF5   L hand burning paresthesias ow imprvd numbness in BUE  Wearing cervical collar     Last Recorded Vitals  Blood pressure (!) 177/99, pulse 78, temperature 36.7 °C (98.1 °F), temperature source Temporal, resp. rate 18, height 1.82 m (5' 11.65\"), weight 81 kg (178 lb 9.2 oz), SpO2 94%.  Intake/Output last 3 Shifts:  I/O last 3 completed shifts:  In: 1251.3 (15.4 mL/kg) [I.V.:1051.3 (13 mL/kg); IV Piggyback:200]  Out: 1500 (18.5 mL/kg) [Urine:1500 (0.5 mL/kg/hr)]  Weight: 81 kg     Relevant Results                              Assessment/Plan   Assessment & Plan  Epidural hematoma (Multi)    Naveen is a 79 y.o. male with HTN, HLD, COPD, Afib (on Xarelto), CAD s/p PCI mid RCA (2015, on ASA), gout, GERD, BPH, p/w fall, +LOC, s/p kcentra, CTH neg, CT CTL epidural hematoma extending from craniocervical junction to C6, L C6 nondisplaced laminar fx, MRI C/T spine epidural hematoma with severe canal stenosis at C5-6 with moderate stenosis at C3-4, C4-5, C6-7     Trauma primary  q2 neurochecks  MAP >65  Will like remove drain today  Maintain collar for 6wks  Encouraged to work with PTOT in collar  Ok to con't SQH, SCDs  Ok to restart ASA POD7 and ok to restart xarelto POD14  Will arrange 2 and 6 week outpatient follow ups        Danielle Vázquez MD      "

## 2025-01-08 NOTE — CARE PLAN
The patient's goals for the shift include      The clinical goals for the shift include ambulate with PT    Over the shift, the patient did not make progress toward the following goals. Barriers to progression include . Recommendations to address these barriers include .    Problem: Fall/Injury  Goal: Not fall by end of shift  1/8/2025 1131 by Es Juan RN  Outcome: Progressing  1/8/2025 1131 by Es Juan RN  Outcome: Progressing  Goal: Be free from injury by end of the shift  1/8/2025 1131 by Es Juan RN  Outcome: Progressing  1/8/2025 1131 by Es Juan RN  Outcome: Progressing  Goal: Verbalize understanding of personal risk factors for fall in the hospital  1/8/2025 1131 by Es Juan RN  Outcome: Progressing  1/8/2025 1131 by Es Juan RN  Outcome: Progressing  Goal: Verbalize understanding of risk factor reduction measures to prevent injury from fall in the home  1/8/2025 1131 by Es Juan RN  Outcome: Progressing  1/8/2025 1131 by Es Juan RN  Outcome: Progressing  Goal: Use assistive devices by end of the shift  1/8/2025 1131 by Es Juan RN  Outcome: Progressing  1/8/2025 1131 by Es Juan RN  Outcome: Progressing  Goal: Pace activities to prevent fatigue by end of the shift  1/8/2025 1131 by Es Juan RN  Outcome: Progressing  1/8/2025 1131 by Es Juan RN  Outcome: Progressing

## 2025-01-08 NOTE — PROGRESS NOTES
Physical Therapy Treatment    Patient Name: Naveen Arauz  MRN: [id number]  Today's Date: 1/8/2025  Room: [facility]8076-A  Time Calculation  Start Time: 0908  Stop Time: 0951  Time Calculation (min): 43 min       Assessment/Plan   PT Assessment  PT Assessment Results: Decreased strength, Decreased endurance, Impaired balance, Decreased mobility, Decreased coordination, Impaired sensation, Pain, Orthopedic restrictions  Rehab Prognosis: Good  Barriers to Discharge Home: Physical needs  Physical Needs: Intermittent mobility assistance needed, High falls risk due to function or environment, Intermittent ADL assistance needed  Evaluation/Treatment Tolerance: Patient limited by pain  Medical Staff Made Aware: Yes  Strengths: Ability to acquire knowledge, Attitude of self  Barriers to Participation: Coping skills  End of Session Communication: Bedside nurse  End of Session Patient Position: Up in chair     PT Plan  Treatment/Interventions: Bed mobility, Transfer training, Gait training, Stair training, Balance training, Strengthening, Endurance training, Range of motion, Therapeutic exercise, Therapeutic activity, Neuromuscular re-education, Positioning, Postural re-education, Home exercise program  PT Plan: Ongoing PT  PT Frequency: 5 times per week  PT Discharge Recommendations: Low intensity level of continued care  Equipment Recommended upon Discharge:  (TBD)  PT Recommended Transfer Status: Assist x1  PT - OK to Discharge: Yes  Assessment: Patient is progressing Well with therapy this date. Pt is unsteady on feet requiring Tial with fww for transfers and gait. Would continue to benefit from continued skilled PT to address all mobility deficits; Patient remains appropriate for LOW intensity therapy when medically ready for discharge from acute stay.  Will continue to follow.     General Visit Information:   PT  Visit  PT Received On: 01/08/25        Subjective   Subjective: Pt upset about pain control, reports he is not  getting adequate pain management. Reports frustration with nursing care with limited response from call light.   Precautions:  Precautions  Medical Precautions: Fall precautions, Oxygen therapy device and L/min  Post-Surgical Precautions: Spinal precautions  Braces Applied: C-collar at all times  Precautions Comment: droplet precautions, contact precautions, MAP >65    Objective   Pain:  Pain Assessment  Pain Assessment: 0-10  0-10 (Numeric) Pain Score: 9  Pain Type: Acute pain  Pain Location: Shoulder  Pain Orientation: Right, Left  Multiple Pain Sites: Two  Pain 2  Pain Score 2: 9  Pain Type 2: Acute pain  Pain Location 2: Neck  Cognition:  Cognition  Overall Cognitive Status: Within Functional Limits  Orientation Level: Oriented X4  Insight: Within function limits  Impulsive: Within functional limits     Static Sitting balance:  Static Sitting Balance  Static Sitting-Balance Support: Feet supported  Static Sitting-Level of Assistance: Distant supervision  Static Standing Balance:  Static Standing Balance  Static Standing-Balance Support: Bilateral upper extremity supported  Static Standing-Level of Assistance: Contact guard  Dynamic Sitting Balance:  Dynamic Sitting Balance  Dynamic Sitting-Balance Support: Feet supported  Dynamic Sitting-Level of Assistance: Close supervision    Lines/Tubes/Drains:  Closed/Suction Drain 1 Superior;Medial Back Accordion 10 Fr. (Active)   Number of days: 2       External Urinary Catheter (Active)   Number of days: 2     PT Treatments:           Bed Mobility 1  Bed Mobility 1: Supine to sitting  Level of Assistance 1: Close supervision  Bed Mobility Comments 1: vc for log roll  Ambulation/Gait Training 1  Surface 1: Level tile  Device 1: Rolling walker  Assistance 1: Minimum assistance  Quality of Gait 1: Decreased step length, Forward flexed posture (unsteady)  Comments/Distance (ft) 1: 16'x2 minor LOB during turns unsteady  Transfer 1  Transfer From 1: Sit to  Transfer to 1:  Stand  Technique 1: Sit to stand, Stand to sit  Transfer Device 1: Walker  Transfer Level of Assistance 1: Contact guard, Minimal verbal cues  Trials/Comments 1: increased difficulty from lower surfaces, vc for handplacement  Transfers 2  Transfer From 2: Stand to  Transfer to 2: Toilet, Chair with arms  Technique 2: Stand pivot  Transfer Device 2: Walker  Transfer Level of Assistance 2: Minimum assistance  Trials/Comments 2: minor instability and LOB             Activity tolerance:  Activity Tolerance  Endurance: Tolerates 10 - 20 min exercise with multiple rests    Outcome Measures:  [facility] Basic Mobility  Turning from your back to your side while in a flat bed without using bedrails: A little  Moving from lying on your back to sitting on the side of a flat bed without using bedrails: A little  Moving to and from bed to chair (including a wheelchair): A little  Standing up from a chair using your arms (e.g. wheelchair or bedside chair): A little  To walk in hospital room: A little  Climbing 3-5 steps with railing: A lot  Basic Mobility - Total Score: 17       Education Documentation  Handouts, taught by Tiffanie Slaughter PTA at 1/8/2025 11:59 AM.  Learner: Patient  Readiness: Acceptance  Method: Explanation  Response: Verbalizes Understanding    Body Mechanics, taught by Tiffanie Slaughter PTA at 1/8/2025 11:59 AM.  Learner: Patient  Readiness: Acceptance  Method: Explanation  Response: Verbalizes Understanding    Precautions, taught by Tiffanie Slaughter PTA at 1/8/2025 11:59 AM.  Learner: Patient  Readiness: Acceptance  Method: Explanation  Response: Verbalizes Understanding    Home Exercise Program, taught by Tiffanie Slaughter PTA at 1/8/2025 11:59 AM.  Learner: Patient  Readiness: Acceptance  Method: Explanation  Response: Verbalizes Understanding    ADL Training, taught by Tiffanie Slaughter PTA at 1/8/2025 11:59 AM.  Learner: Patient  Readiness: Acceptance  Method: Explanation  Response: Verbalizes Understanding    Precautions, taught  by Tiffanie Slaughter PTA at 1/8/2025 11:59 AM.  Learner: Patient  Readiness: Acceptance  Method: Explanation  Response: Verbalizes Understanding    Body Mechanics, taught by Tiffanie Slaguhter PTA at 1/8/2025 11:59 AM.  Learner: Patient  Readiness: Acceptance  Method: Explanation  Response: Verbalizes Understanding    Mobility Training, taught by Tiffanie Slaughter PTA at 1/8/2025 11:59 AM.  Learner: Patient  Readiness: Acceptance  Method: Explanation  Response: Verbalizes Understanding    Education Comments  No comments found.          OP EDUCATION:       Encounter Problems       Encounter Problems (Active)       Balance       Pt will score >24/28 on Tinetti indicating low fall risk  (Progressing)       Start:  01/06/25    Expected End:  01/20/25               Mobility       STG - Patient will ambulate >150ft using LRAD Theresa (Progressing)       Start:  01/06/25    Expected End:  01/20/25            STG - Patient will ascend and descend a flight of stairs Theresa (Progressing)       Start:  01/06/25    Expected End:  01/20/25               PT Transfers       Pt will complete all functional transfers independently  (Progressing)       Start:  01/06/25    Expected End:  01/20/25               Pain - Adult

## 2025-01-09 ENCOUNTER — APPOINTMENT (OUTPATIENT)
Dept: RADIOLOGY | Facility: HOSPITAL | Age: 80
DRG: 028 | End: 2025-01-09
Payer: MEDICARE

## 2025-01-09 LAB
ALBUMIN SERPL BCP-MCNC: 3.4 G/DL (ref 3.4–5)
ANION GAP SERPL CALC-SCNC: 13 MMOL/L (ref 10–20)
BACTERIA BLD CULT: NORMAL
BACTERIA BLD CULT: NORMAL
BUN SERPL-MCNC: 10 MG/DL (ref 6–23)
CALCIUM SERPL-MCNC: 8.8 MG/DL (ref 8.6–10.6)
CHLORIDE SERPL-SCNC: 100 MMOL/L (ref 98–107)
CO2 SERPL-SCNC: 25 MMOL/L (ref 21–32)
CREAT SERPL-MCNC: 0.55 MG/DL (ref 0.5–1.3)
EGFRCR SERPLBLD CKD-EPI 2021: >90 ML/MIN/1.73M*2
ERYTHROCYTE [DISTWIDTH] IN BLOOD BY AUTOMATED COUNT: 13.1 % (ref 11.5–14.5)
GLUCOSE BLD MANUAL STRIP-MCNC: 118 MG/DL (ref 74–99)
GLUCOSE BLD MANUAL STRIP-MCNC: 128 MG/DL (ref 74–99)
GLUCOSE BLD MANUAL STRIP-MCNC: 152 MG/DL (ref 74–99)
GLUCOSE SERPL-MCNC: 119 MG/DL (ref 74–99)
HCT VFR BLD AUTO: 37.6 % (ref 41–52)
HGB BLD-MCNC: 12.6 G/DL (ref 13.5–17.5)
MAGNESIUM SERPL-MCNC: 2.1 MG/DL (ref 1.6–2.4)
MCH RBC QN AUTO: 33.2 PG (ref 26–34)
MCHC RBC AUTO-ENTMCNC: 33.5 G/DL (ref 32–36)
MCV RBC AUTO: 99 FL (ref 80–100)
NRBC BLD-RTO: 0 /100 WBCS (ref 0–0)
PHOSPHATE SERPL-MCNC: 3.3 MG/DL (ref 2.5–4.9)
PLATELET # BLD AUTO: 148 X10*3/UL (ref 150–450)
POTASSIUM SERPL-SCNC: 4.3 MMOL/L (ref 3.5–5.3)
RBC # BLD AUTO: 3.8 X10*6/UL (ref 4.5–5.9)
SODIUM SERPL-SCNC: 134 MMOL/L (ref 136–145)
WBC # BLD AUTO: 9.5 X10*3/UL (ref 4.4–11.3)

## 2025-01-09 PROCEDURE — 1100000001 HC PRIVATE ROOM DAILY

## 2025-01-09 PROCEDURE — 2500000004 HC RX 250 GENERAL PHARMACY W/ HCPCS (ALT 636 FOR OP/ED): Performed by: STUDENT IN AN ORGANIZED HEALTH CARE EDUCATION/TRAINING PROGRAM

## 2025-01-09 PROCEDURE — 2500000004 HC RX 250 GENERAL PHARMACY W/ HCPCS (ALT 636 FOR OP/ED)

## 2025-01-09 PROCEDURE — 2500000001 HC RX 250 WO HCPCS SELF ADMINISTERED DRUGS (ALT 637 FOR MEDICARE OP): Performed by: STUDENT IN AN ORGANIZED HEALTH CARE EDUCATION/TRAINING PROGRAM

## 2025-01-09 PROCEDURE — 82947 ASSAY GLUCOSE BLOOD QUANT: CPT

## 2025-01-09 PROCEDURE — 71045 X-RAY EXAM CHEST 1 VIEW: CPT | Performed by: RADIOLOGY

## 2025-01-09 PROCEDURE — 83735 ASSAY OF MAGNESIUM: CPT

## 2025-01-09 PROCEDURE — 85027 COMPLETE CBC AUTOMATED: CPT

## 2025-01-09 PROCEDURE — 71045 X-RAY EXAM CHEST 1 VIEW: CPT

## 2025-01-09 PROCEDURE — 2500000001 HC RX 250 WO HCPCS SELF ADMINISTERED DRUGS (ALT 637 FOR MEDICARE OP)

## 2025-01-09 PROCEDURE — 2500000002 HC RX 250 W HCPCS SELF ADMINISTERED DRUGS (ALT 637 FOR MEDICARE OP, ALT 636 FOR OP/ED)

## 2025-01-09 PROCEDURE — 36415 COLL VENOUS BLD VENIPUNCTURE: CPT

## 2025-01-09 PROCEDURE — 80069 RENAL FUNCTION PANEL: CPT

## 2025-01-09 RX ORDER — OXYCODONE HYDROCHLORIDE 10 MG/1
10 TABLET ORAL EVERY 4 HOURS
Status: DISCONTINUED | OUTPATIENT
Start: 2025-01-09 | End: 2025-01-10

## 2025-01-09 RX ADMIN — HYDROMORPHONE HYDROCHLORIDE 0.6 MG: 1 INJECTION, SOLUTION INTRAMUSCULAR; INTRAVENOUS; SUBCUTANEOUS at 08:26

## 2025-01-09 RX ADMIN — METHOCARBAMOL TABLETS 500 MG: 500 TABLET, COATED ORAL at 14:12

## 2025-01-09 RX ADMIN — POLYETHYLENE GLYCOL 3350 17 G: 17 POWDER, FOR SOLUTION ORAL at 08:25

## 2025-01-09 RX ADMIN — OXYCODONE HYDROCHLORIDE 10 MG: 10 TABLET ORAL at 18:27

## 2025-01-09 RX ADMIN — CEFTRIAXONE SODIUM 2 G: 2 INJECTION, SOLUTION INTRAVENOUS at 10:46

## 2025-01-09 RX ADMIN — ACETAMINOPHEN 650 MG: 325 TABLET ORAL at 16:25

## 2025-01-09 RX ADMIN — ACETAMINOPHEN 650 MG: 325 TABLET ORAL at 21:52

## 2025-01-09 RX ADMIN — ATORVASTATIN CALCIUM 40 MG: 40 TABLET, FILM COATED ORAL at 21:52

## 2025-01-09 RX ADMIN — ACETAMINOPHEN 650 MG: 325 TABLET ORAL at 10:46

## 2025-01-09 RX ADMIN — METHOCARBAMOL TABLETS 500 MG: 500 TABLET, COATED ORAL at 21:52

## 2025-01-09 RX ADMIN — HYDROMORPHONE HYDROCHLORIDE 0.6 MG: 1 INJECTION, SOLUTION INTRAMUSCULAR; INTRAVENOUS; SUBCUTANEOUS at 16:25

## 2025-01-09 RX ADMIN — METHOCARBAMOL TABLETS 500 MG: 500 TABLET, COATED ORAL at 05:56

## 2025-01-09 RX ADMIN — OXYCODONE HYDROCHLORIDE 10 MG: 10 TABLET ORAL at 21:52

## 2025-01-09 RX ADMIN — DOCUSATE SODIUM 100 MG: 100 CAPSULE, LIQUID FILLED ORAL at 21:52

## 2025-01-09 RX ADMIN — PANTOPRAZOLE SODIUM 40 MG: 40 TABLET, DELAYED RELEASE ORAL at 08:26

## 2025-01-09 RX ADMIN — OXYCODONE HYDROCHLORIDE 10 MG: 10 TABLET ORAL at 14:12

## 2025-01-09 RX ADMIN — OXYCODONE HYDROCHLORIDE 10 MG: 10 TABLET ORAL at 00:33

## 2025-01-09 RX ADMIN — AZITHROMYCIN DIHYDRATE 500 MG: 500 TABLET ORAL at 08:26

## 2025-01-09 RX ADMIN — OXYCODONE HYDROCHLORIDE 15 MG: 10 TABLET ORAL at 10:46

## 2025-01-09 RX ADMIN — DOCUSATE SODIUM 100 MG: 100 CAPSULE, LIQUID FILLED ORAL at 08:26

## 2025-01-09 RX ADMIN — INSULIN LISPRO 1 UNITS: 100 INJECTION, SOLUTION INTRAVENOUS; SUBCUTANEOUS at 11:40

## 2025-01-09 RX ADMIN — ENOXAPARIN SODIUM 30 MG: 100 INJECTION SUBCUTANEOUS at 08:26

## 2025-01-09 RX ADMIN — ENOXAPARIN SODIUM 30 MG: 100 INJECTION SUBCUTANEOUS at 21:52

## 2025-01-09 RX ADMIN — OXYCODONE HYDROCHLORIDE 10 MG: 10 TABLET ORAL at 06:02

## 2025-01-09 ASSESSMENT — PAIN SCALES - GENERAL
PAINLEVEL_OUTOF10: 9
PAINLEVEL_OUTOF10: 5 - MODERATE PAIN
PAINLEVEL_OUTOF10: 8
PAINLEVEL_OUTOF10: 8
PAINLEVEL_OUTOF10: 7
PAINLEVEL_OUTOF10: 9

## 2025-01-09 ASSESSMENT — COGNITIVE AND FUNCTIONAL STATUS - GENERAL
MOVING FROM LYING ON BACK TO SITTING ON SIDE OF FLAT BED WITH BEDRAILS: A LITTLE
MOBILITY SCORE: 18
WALKING IN HOSPITAL ROOM: A LITTLE
MOVING TO AND FROM BED TO CHAIR: A LITTLE
MOBILITY SCORE: 18
MOVING FROM LYING ON BACK TO SITTING ON SIDE OF FLAT BED WITH BEDRAILS: A LITTLE
STANDING UP FROM CHAIR USING ARMS: A LITTLE
TURNING FROM BACK TO SIDE WHILE IN FLAT BAD: A LITTLE
TURNING FROM BACK TO SIDE WHILE IN FLAT BAD: A LITTLE
WALKING IN HOSPITAL ROOM: A LITTLE
CLIMB 3 TO 5 STEPS WITH RAILING: A LITTLE
CLIMB 3 TO 5 STEPS WITH RAILING: A LITTLE
STANDING UP FROM CHAIR USING ARMS: A LITTLE
MOVING TO AND FROM BED TO CHAIR: A LITTLE

## 2025-01-09 ASSESSMENT — PAIN - FUNCTIONAL ASSESSMENT
PAIN_FUNCTIONAL_ASSESSMENT: 0-10
PAIN_FUNCTIONAL_ASSESSMENT: 0-10

## 2025-01-09 ASSESSMENT — ACTIVITIES OF DAILY LIVING (ADL): LACK_OF_TRANSPORTATION: NO

## 2025-01-09 ASSESSMENT — PAIN DESCRIPTION - LOCATION: LOCATION: SHOULDER

## 2025-01-09 NOTE — CARE PLAN
Problem: Fall/Injury  Goal: Not fall by end of shift  Outcome: Progressing  Goal: Be free from injury by end of the shift  Outcome: Progressing  Goal: Verbalize understanding of personal risk factors for fall in the hospital  Outcome: Progressing  Goal: Verbalize understanding of risk factor reduction measures to prevent injury from fall in the home  Outcome: Progressing  Goal: Use assistive devices by end of the shift  Outcome: Progressing  Goal: Pace activities to prevent fatigue by end of the shift  Outcome: Progressing     Problem: Skin  Goal: Decreased wound size/increased tissue granulation at next dressing change  Outcome: Progressing  Goal: Participates in plan/prevention/treatment measures  Outcome: Progressing  Goal: Prevent/manage excess moisture  Outcome: Progressing  Goal: Prevent/minimize sheer/friction injuries  Outcome: Progressing  Goal: Promote/optimize nutrition  Outcome: Progressing  Goal: Promote skin healing  Outcome: Progressing     Problem: Pain  Goal: Takes deep breaths with improved pain control throughout the shift  Outcome: Progressing  Goal: Turns in bed with improved pain control throughout the shift  Outcome: Progressing  Goal: Walks with improved pain control throughout the shift  Outcome: Progressing  Goal: Performs ADL's with improved pain control throughout shift  Outcome: Progressing  Goal: Participates in PT with improved pain control throughout the shift  Outcome: Progressing  Goal: Free from opioid side effects throughout the shift  Outcome: Progressing  Goal: Free from acute confusion related to pain meds throughout the shift  Outcome: Progressing     Problem: Pain - Adult  Goal: Verbalizes/displays adequate comfort level or baseline comfort level  Outcome: Progressing     Problem: Safety - Adult  Goal: Free from fall injury  Outcome: Progressing     Problem: Discharge Planning  Goal: Discharge to home or other facility with appropriate resources  Outcome: Progressing      Problem: Chronic Conditions and Co-morbidities  Goal: Patient's chronic conditions and co-morbidity symptoms are monitored and maintained or improved  Outcome: Progressing

## 2025-01-09 NOTE — PROGRESS NOTES
[facility]  TRAUMA SERVICE - PROGRESS NOTE    Patient Name: Naveen Arauz  MRN: [id number]  Admit Date: 105  : 1945  AGE: 79 y.o.   GENDER: male  ==============================================================================  MECHANISM OF INJURY:   MECHANISM OF INJURY:   79M on xarelto for afib (last taken last night 5:30 pm) s/p mechanical fall down two steps. +head strike without LOC. Complained of neck pain, taken to [facility]. Pan scan notable for C6 fx with large spinal EDH. Grossly NVI since, moderate neck pain, no new numbness/tingling. Also started on CAP treatment, has had dyspnea for past few days, new oxygen requirement.      LOC (yes/no?): No  Anticoagulant / Anti-platelet Rx? 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      OTHER MEDICAL PROBLEMS:  RSV CAP  HTN  HLD  COPD, not on home oxygen  Afib on xarelto  CAD s/p PCI mid RCA  TIA on ASA  Gout  GERD  BPH  Pulmonary HTN  Esophageal stricture  Lumbago     INCIDENTAL FINDINGS:  Lung nodule  Cholelithiasis  AAA     PROCEDURES:  C4-6 Laminectomy for epidural hematoma evacuation    ==============================================================================  TODAY'S ASSESSMENT AND PLAN OF CARE:    #C6 lamina fracture #Spinal epidural hematoma  - s/p 1.5 leminectomy with hematoma evacuation  - Epidural drain removed by NSGY   - Maintain cervical collar for 6 weeks   - Patient to remain in c-collar at all times. 2 and 6 week follow ups have been arranged. If patient is still in the hospital 2 weeks after surgery, please let neurosurgery team know.      #Analgesia  - Scheduled tylenol, Oxy 5/10 prn, Dilaudid breakthrough  - Adding Robaxin  and increasing analgesic dosages  - Increasing regimen . Patient on morphine baseline  for severe arthritic pain.      #GI #Esophageal stricture #GERD  - Formal swallow study & MBS 1/6, passed OK for diet (regular diet and thickened liquids). Repeat testing w/ SLP in 1-2 wks  - Pantoprazole  - Bowel regimen     #CV #HLD #Afib #CAD s/p PCI mid RCA #h/o TIA  - Restart Aspirin POD#7, Restart Xarelto POD#14. Patient had surgery 1/5.  - MAP goal >65  - Holding home metoprolol  - Atorvastatin     #COPD (not on home O2) #Pulmonary HTN #RSV PNA  - >92% on 5L NC  - DuoNeb prn  - Bcx 1/5 NGTDx1  - Continue azithromycin and ceftriaxone for 5 days (end 1/10)  - 1/9 1500mL spirometry     # #Hyponatremia, resolved #BPH  - , down from 1L. Found to be retaining w/ PVR 400cc this afternoon. Able to urinate on second try w/ 300cc out. Straight cath not required.  - Cr and BUN WNL  - Replete electrolytes to potassium of 4.0, magnesium of 2.0.     # Gout  - Hold home colchicine      #Heme  - INR 1.3, Hgb stable, plts drifting down  - Hematology s/o     #Endocrine  - Sliding scale insulin     #PT/OT  - Home care recommended     #DVTppx  - SCDs  - Enoxaparin     #Dispo  - Home with home health care  - 2 and 6 wk outpatient w/ NSGY  - Likely staying until abx course finish and weaned off O2.     ==============================================================================  CHIEF COMPLAINT / OVERNIGHT EVENTS:   No overnight events on signout. Patient has no acute concerns. Patient does have significant shoulder pain due to arthritis.     MEDICAL HISTORY / ROS:  Admission history and ROS reviewed.    PHYSICAL EXAM:  Heart Rate:  []   Temp:  [36.7 °C (98.1 °F)-37 °C (98.6 °F)]   Resp:  [17-18]   BP: (141-169)/(76-92)   SpO2:  [91 %-97 %]     Physical Exam  Vitals and nursing note reviewed. Exam conducted with a chaperone present.   Constitutional:       General: He is not in acute distress.     Appearance: Normal appearance. He is normal weight. He is not ill-appearing, toxic-appearing or diaphoretic.   HENT:       Head: Normocephalic and atraumatic.      Right Ear: External ear normal.      Left Ear: External ear normal.      Nose: Nose normal. No rhinorrhea.   Eyes:      General:         Right eye: No discharge.         Left eye: No discharge.      Extraocular Movements: Extraocular movements intact.      Conjunctiva/sclera: Conjunctivae normal.   Neck:      Comments: In cervical collar  Cardiovascular:      Rate and Rhythm: Normal rate. Rhythm irregular.      Heart sounds: Normal heart sounds.   Pulmonary:      Effort: Pulmonary effort is normal. No respiratory distress.      Breath sounds: No stridor. Rhonchi present.      Comments: On 4L O2 NC  Chest:      Chest wall: No tenderness.   Abdominal:      General: There is no distension.      Palpations: Abdomen is soft.      Tenderness: There is no abdominal tenderness. There is no guarding.   Musculoskeletal:         General: No tenderness.      Cervical back: No rigidity.      Right lower leg: No edema.      Left lower leg: No edema.   Skin:     General: Skin is warm and dry.      Capillary Refill: Capillary refill takes less than 2 seconds.      Findings: Bruising present.   Neurological:      General: No focal deficit present.      Mental Status: He is alert and oriented to person, place, and time. Mental status is at baseline.      Sensory: No sensory deficit.   Psychiatric:         Mood and Affect: Mood normal.         Behavior: Behavior normal.         Thought Content: Thought content normal.       IMAGING SUMMARY:  no new imaging    LABS:  Results from last 7 days   Lab Units 01/09/25  0745 01/08/25  0825 01/07/25  0029 01/05/25  1759 01/05/25  0157   WBC AUTO x10*3/uL 9.5 9.1 12.1*   < > 12.7*   HEMOGLOBIN g/dL 12.6* 12.4* 11.8*   < > 15.3   HEMATOCRIT % 37.6* 37.4* 34.8*   < > 45.4   PLATELETS AUTO x10*3/uL 148* 126* 125*   < > 142*   NEUTROS PCT AUTO %  --   --   --   --  67.2   LYMPHS PCT AUTO %  --   --   --   --  19.7   MONOS PCT AUTO %  --   --   --   --   9.2   EOS PCT AUTO %  --   --   --   --  1.6    < > = values in this interval not displayed.     Results from last 7 days   Lab Units 01/07/25  0029 01/06/25  0205 01/05/25  1759   APTT seconds 29 31 33   INR  1.3* 1.3* 1.4*     Results from last 7 days   Lab Units 01/09/25  0745 01/08/25  0825 01/07/25  0029 01/05/25  1759 01/05/25  1045 01/05/25  0157   SODIUM mmol/L 134* 134* 138   < > 131* 132*   POTASSIUM mmol/L 4.3 3.9 4.2   < > 4.2 4.2   CHLORIDE mmol/L 100 99 100   < > 94* 95*   CO2 mmol/L 25 29 28   < > 27 27   BUN mg/dL 10 10 13   < > 12 10   CREATININE mg/dL 0.55 0.60 0.62   < > 0.78 0.79   CALCIUM mg/dL 8.8 8.8 8.6   < > 8.7 8.7   PROTEIN TOTAL g/dL  --   --   --   --  6.8 7.1   BILIRUBIN TOTAL mg/dL  --   --   --   --  0.8 0.8   ALK PHOS U/L  --   --   --   --  129 138*   ALT U/L  --   --   --   --  28 30   AST U/L  --   --   --   --  47* 37   GLUCOSE mg/dL 119* 126* 133*   < > 184* 155*    < > = values in this interval not displayed.     Results from last 7 days   Lab Units 01/05/25  1045 01/05/25  0157   BILIRUBIN TOTAL mg/dL 0.8 0.8     Results from last 7 days   Lab Units 01/06/25  0205 01/05/25  1800 01/05/25  1538   POCT PH, ARTERIAL pH 7.41 7.40 7.33*   POCT PCO2, ARTERIAL mm Hg 44* 42 52*   POCT PO2, ARTERIAL mm Hg 81* 65* 174*   POCT HCO3 CALCULATED, ARTERIAL mmol/L 27.9* 26.0 27.4*   POCT BASE EXCESS, ARTERIAL mmol/L 2.8 1.0 0.6       I have reviewed all medications, laboratory results, and imaging pertinent for today's encounter.

## 2025-01-09 NOTE — PROGRESS NOTES
Transitional Care Coordinator Progress Note:        01/09/25 0954   Discharge Planning   Living Arrangements Spouse/significant other   Support Systems Spouse/significant other   Assistance Needed Walker   Type of Residence Private residence   Number of Stairs to Enter Residence 2  (Home has ramp)   Number of Stairs Within Residence 13   Do you have animals or pets at home? Yes   Type of Animals or Pets 1 dog Collie   Who is requesting discharge planning? Patient   Home or Post Acute Services None   Expected Discharge Disposition Home H   Does the patient need discharge transport arranged? No   Financial Resource Strain   How hard is it for you to pay for the very basics like food, housing, medical care, and heating? Not hard   Housing Stability   In the last 12 months, was there a time when you were not able to pay the mortgage or rent on time? N   In the past 12 months, how many times have you moved where you were living? 1   At any time in the past 12 months, were you homeless or living in a shelter (including now)? N   Transportation Needs   In the past 12 months, has lack of transportation kept you from medical appointments or from getting medications? no   In the past 12 months, has lack of transportation kept you from meetings, work, or from getting things needed for daily living? No   Patient Choice   Provider Choice list and CMS website (https://medicare.gov/care-compare#search) for post-acute Quality and Resource Measure Data were provided and reviewed with: Patient   Patient / Family choosing to utilize agency / facility established prior to hospitalization No     Assessment Note:  Called wife Karla Arauz introduced myself as care coordinator and member of the Care Transitions team for discharge planning. Wife reports her  is very active and feels safe at home.  Wife provides transport to drs appts.  Pt's address, phone number and contact information was verified.  Pt does not have any  questions/concerns at this time.     Previous Home Care: None however pt and wife open to [facility] if they are unable to accept wife would like a referral placed through [facility] for services.   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

## 2025-01-09 NOTE — CARE PLAN
The patient's goals for the shift include      The clinical goals for the shift include pt will remain pain free through out shift    Over the shift, the patient did not make progress toward the following goals. Barriers to progression include   . Recommendations to address these barriers include     Problem: Fall/Injury  Goal: Not fall by end of shift  Outcome: Progressing  Goal: Be free from injury by end of the shift  Outcome: Progressing  Goal: Verbalize understanding of personal risk factors for fall in the hospital  Outcome: Progressing  Goal: Verbalize understanding of risk factor reduction measures to prevent injury from fall in the home  Outcome: Progressing  Goal: Use assistive devices by end of the shift  Outcome: Progressing  Goal: Pace activities to prevent fatigue by end of the shift  Outcome: Progressing     Problem: Pain  Goal: Takes deep breaths with improved pain control throughout the shift  Outcome: Progressing  Goal: Turns in bed with improved pain control throughout the shift  Outcome: Progressing  Goal: Walks with improved pain control throughout the shift  Outcome: Progressing  Goal: Performs ADL's with improved pain control throughout shift  Outcome: Progressing  Goal: Participates in PT with improved pain control throughout the shift  Outcome: Progressing  Goal: Free from opioid side effects throughout the shift  Outcome: Progressing  Goal: Free from acute confusion related to pain meds throughout the shift  Outcome: Progressing     .

## 2025-01-09 NOTE — CARE PLAN
The patient's goals for the shift include      The clinical goals for the shift include pt will remain pain free through out shift    Over the shift, the patient did not make progress toward the following goals. Barriers to progression include . Recommendations to address these barriers include .    Problem: Fall/Injury  Goal: Not fall by end of shift  Outcome: Progressing  Goal: Be free from injury by end of the shift  Outcome: Progressing  Goal: Verbalize understanding of personal risk factors for fall in the hospital  Outcome: Progressing  Goal: Verbalize understanding of risk factor reduction measures to prevent injury from fall in the home  Outcome: Progressing  Goal: Use assistive devices by end of the shift  Outcome: Progressing  Goal: Pace activities to prevent fatigue by end of the shift  Outcome: Progressing     Problem: Skin  Goal: Decreased wound size/increased tissue granulation at next dressing change  Outcome: Progressing  Goal: Participates in plan/prevention/treatment measures  Outcome: Progressing  Goal: Prevent/manage excess moisture  Outcome: Progressing  Goal: Prevent/minimize sheer/friction injuries  Outcome: Progressing  Goal: Promote/optimize nutrition  Outcome: Progressing  Goal: Promote skin healing  Outcome: Progressing

## 2025-01-09 NOTE — CARE PLAN
Problem: Skin  Goal: Decreased wound size/increased tissue granulation at next dressing change  1/9/2025 0958 by Es Juan RN  Flowsheets (Taken 1/8/2025 1133)  Decreased wound size/increased tissue granulation at next dressing change:   Promote sleep for wound healing   Utilize specialty bed per algorithm   Protective dressings over bony prominences  1/9/2025 0958 by Es Juan RN  Outcome: Progressing  Goal: Participates in plan/prevention/treatment measures  1/9/2025 1102 by Es Juan RN  Flowsheets (Taken 1/9/2025 1102)  Participates in plan/prevention/treatment measures:   Discuss with provider PT/OT consult   Increase activity/out of bed for meals   Elevate heels  1/9/2025 0959 by Es Juan RN  Flowsheets (Taken 1/7/2025 1307 by Mary Abdi LPN)  Participates in plan/prevention/treatment measures: Elevate heels  1/9/2025 0958 by Es Juan RN  Outcome: Progressing  Goal: Prevent/manage excess moisture  Outcome: Progressing  Goal: Prevent/minimize sheer/friction injuries  Outcome: Progressing  Goal: Promote/optimize nutrition  Outcome: Progressing  Goal: Promote skin healing  Outcome: Progressing   The patient's goals for the shift include      The clinical goals for the shift include pt will have control of pain this shift    Over the shift, the patient did not make progress toward the following goals. Barriers to progression include . Recommendations to address these barriers include .

## 2025-01-10 ENCOUNTER — APPOINTMENT (OUTPATIENT)
Dept: RADIOLOGY | Facility: HOSPITAL | Age: 80
DRG: 028 | End: 2025-01-10
Payer: MEDICARE

## 2025-01-10 ENCOUNTER — HOME HEALTH ADMISSION (OUTPATIENT)
Dept: HOME HEALTH SERVICES | Facility: HOME HEALTH | Age: 80
End: 2025-01-10
Payer: MEDICARE

## 2025-01-10 LAB
ALBUMIN SERPL BCP-MCNC: 3.2 G/DL (ref 3.4–5)
ANION GAP SERPL CALC-SCNC: 13 MMOL/L (ref 10–20)
BUN SERPL-MCNC: 10 MG/DL (ref 6–23)
CALCIUM SERPL-MCNC: 8.7 MG/DL (ref 8.6–10.6)
CHLORIDE SERPL-SCNC: 100 MMOL/L (ref 98–107)
CO2 SERPL-SCNC: 28 MMOL/L (ref 21–32)
CREAT SERPL-MCNC: 0.66 MG/DL (ref 0.5–1.3)
EGFRCR SERPLBLD CKD-EPI 2021: >90 ML/MIN/1.73M*2
ERYTHROCYTE [DISTWIDTH] IN BLOOD BY AUTOMATED COUNT: 13.2 % (ref 11.5–14.5)
GLUCOSE BLD MANUAL STRIP-MCNC: 125 MG/DL (ref 74–99)
GLUCOSE BLD MANUAL STRIP-MCNC: 126 MG/DL (ref 74–99)
GLUCOSE BLD MANUAL STRIP-MCNC: 139 MG/DL (ref 74–99)
GLUCOSE BLD MANUAL STRIP-MCNC: 148 MG/DL (ref 74–99)
GLUCOSE SERPL-MCNC: 111 MG/DL (ref 74–99)
HCT VFR BLD AUTO: 35.1 % (ref 41–52)
HGB BLD-MCNC: 11.8 G/DL (ref 13.5–17.5)
MAGNESIUM SERPL-MCNC: 2.07 MG/DL (ref 1.6–2.4)
MCH RBC QN AUTO: 33.1 PG (ref 26–34)
MCHC RBC AUTO-ENTMCNC: 33.6 G/DL (ref 32–36)
MCV RBC AUTO: 98 FL (ref 80–100)
NRBC BLD-RTO: 0 /100 WBCS (ref 0–0)
PHOSPHATE SERPL-MCNC: 3.3 MG/DL (ref 2.5–4.9)
PLATELET # BLD AUTO: 149 X10*3/UL (ref 150–450)
POTASSIUM SERPL-SCNC: 4.2 MMOL/L (ref 3.5–5.3)
RBC # BLD AUTO: 3.57 X10*6/UL (ref 4.5–5.9)
SODIUM SERPL-SCNC: 137 MMOL/L (ref 136–145)
WBC # BLD AUTO: 9.5 X10*3/UL (ref 4.4–11.3)

## 2025-01-10 PROCEDURE — 36415 COLL VENOUS BLD VENIPUNCTURE: CPT

## 2025-01-10 PROCEDURE — 2500000001 HC RX 250 WO HCPCS SELF ADMINISTERED DRUGS (ALT 637 FOR MEDICARE OP): Performed by: STUDENT IN AN ORGANIZED HEALTH CARE EDUCATION/TRAINING PROGRAM

## 2025-01-10 PROCEDURE — 83735 ASSAY OF MAGNESIUM: CPT

## 2025-01-10 PROCEDURE — 2500000004 HC RX 250 GENERAL PHARMACY W/ HCPCS (ALT 636 FOR OP/ED): Performed by: STUDENT IN AN ORGANIZED HEALTH CARE EDUCATION/TRAINING PROGRAM

## 2025-01-10 PROCEDURE — 71045 X-RAY EXAM CHEST 1 VIEW: CPT

## 2025-01-10 PROCEDURE — 2500000004 HC RX 250 GENERAL PHARMACY W/ HCPCS (ALT 636 FOR OP/ED)

## 2025-01-10 PROCEDURE — 2500000001 HC RX 250 WO HCPCS SELF ADMINISTERED DRUGS (ALT 637 FOR MEDICARE OP)

## 2025-01-10 PROCEDURE — 82947 ASSAY GLUCOSE BLOOD QUANT: CPT

## 2025-01-10 PROCEDURE — 80069 RENAL FUNCTION PANEL: CPT

## 2025-01-10 PROCEDURE — 97530 THERAPEUTIC ACTIVITIES: CPT | Mod: GP,CQ

## 2025-01-10 PROCEDURE — 1100000001 HC PRIVATE ROOM DAILY

## 2025-01-10 PROCEDURE — 71045 X-RAY EXAM CHEST 1 VIEW: CPT | Performed by: RADIOLOGY

## 2025-01-10 PROCEDURE — 92526 ORAL FUNCTION THERAPY: CPT | Mod: GN

## 2025-01-10 PROCEDURE — 97116 GAIT TRAINING THERAPY: CPT | Mod: GP,CQ

## 2025-01-10 PROCEDURE — 85027 COMPLETE CBC AUTOMATED: CPT

## 2025-01-10 RX ADMIN — ACETAMINOPHEN 650 MG: 325 TABLET ORAL at 05:44

## 2025-01-10 RX ADMIN — OXYCODONE HYDROCHLORIDE 10 MG: 10 TABLET ORAL at 09:51

## 2025-01-10 RX ADMIN — ENOXAPARIN SODIUM 30 MG: 100 INJECTION SUBCUTANEOUS at 09:13

## 2025-01-10 RX ADMIN — ACETAMINOPHEN 650 MG: 325 TABLET ORAL at 12:09

## 2025-01-10 RX ADMIN — METHOCARBAMOL TABLETS 500 MG: 500 TABLET, COATED ORAL at 21:06

## 2025-01-10 RX ADMIN — ATORVASTATIN CALCIUM 40 MG: 40 TABLET, FILM COATED ORAL at 21:06

## 2025-01-10 RX ADMIN — METHOCARBAMOL TABLETS 500 MG: 500 TABLET, COATED ORAL at 13:28

## 2025-01-10 RX ADMIN — METHOCARBAMOL TABLETS 500 MG: 500 TABLET, COATED ORAL at 05:44

## 2025-01-10 RX ADMIN — OXYCODONE HYDROCHLORIDE 10 MG: 10 TABLET ORAL at 01:53

## 2025-01-10 RX ADMIN — POLYETHYLENE GLYCOL 3350 17 G: 17 POWDER, FOR SOLUTION ORAL at 09:13

## 2025-01-10 RX ADMIN — DOCUSATE SODIUM 100 MG: 100 CAPSULE, LIQUID FILLED ORAL at 09:13

## 2025-01-10 RX ADMIN — OXYCODONE HYDROCHLORIDE 10 MG: 10 TABLET ORAL at 05:44

## 2025-01-10 RX ADMIN — AZITHROMYCIN DIHYDRATE 500 MG: 500 TABLET ORAL at 09:13

## 2025-01-10 RX ADMIN — HYDROMORPHONE HYDROCHLORIDE 0.6 MG: 1 INJECTION, SOLUTION INTRAMUSCULAR; INTRAVENOUS; SUBCUTANEOUS at 16:34

## 2025-01-10 RX ADMIN — ENOXAPARIN SODIUM 30 MG: 100 INJECTION SUBCUTANEOUS at 21:06

## 2025-01-10 RX ADMIN — OXYCODONE 15 MG: 5 TABLET ORAL at 18:10

## 2025-01-10 RX ADMIN — PANTOPRAZOLE SODIUM 40 MG: 40 TABLET, DELAYED RELEASE ORAL at 05:44

## 2025-01-10 RX ADMIN — ACETAMINOPHEN 650 MG: 325 TABLET ORAL at 18:10

## 2025-01-10 RX ADMIN — CEFTRIAXONE SODIUM 2 G: 2 INJECTION, SOLUTION INTRAVENOUS at 12:10

## 2025-01-10 RX ADMIN — OXYCODONE HYDROCHLORIDE 15 MG: 10 TABLET ORAL at 13:26

## 2025-01-10 ASSESSMENT — PAIN SCALES - WONG BAKER
WONGBAKER_NUMERICALRESPONSE: HURTS WHOLE LOT
WONGBAKER_NUMERICALRESPONSE: HURTS WHOLE LOT

## 2025-01-10 ASSESSMENT — PAIN SCALES - GENERAL
PAINLEVEL_OUTOF10: 10 - WORST POSSIBLE PAIN
PAINLEVEL_OUTOF10: 9
PAINLEVEL_OUTOF10: 8
PAINLEVEL_OUTOF10: 7
PAINLEVEL_OUTOF10: 5 - MODERATE PAIN
PAINLEVEL_OUTOF10: 10 - WORST POSSIBLE PAIN

## 2025-01-10 ASSESSMENT — COGNITIVE AND FUNCTIONAL STATUS - GENERAL
MOBILITY SCORE: 18
EATING MEALS: A LITTLE
TURNING FROM BACK TO SIDE WHILE IN FLAT BAD: A LITTLE
MOVING FROM LYING ON BACK TO SITTING ON SIDE OF FLAT BED WITH BEDRAILS: A LITTLE
HELP NEEDED FOR BATHING: A LITTLE
MOVING FROM LYING ON BACK TO SITTING ON SIDE OF FLAT BED WITH BEDRAILS: A LITTLE
MOVING FROM LYING ON BACK TO SITTING ON SIDE OF FLAT BED WITH BEDRAILS: A LITTLE
STANDING UP FROM CHAIR USING ARMS: A LITTLE
TURNING FROM BACK TO SIDE WHILE IN FLAT BAD: A LITTLE
DAILY ACTIVITIY SCORE: 18
DRESSING REGULAR UPPER BODY CLOTHING: A LITTLE
CLIMB 3 TO 5 STEPS WITH RAILING: A LITTLE
CLIMB 3 TO 5 STEPS WITH RAILING: A LITTLE
TOILETING: A LITTLE
TURNING FROM BACK TO SIDE WHILE IN FLAT BAD: A LITTLE
MOVING TO AND FROM BED TO CHAIR: A LITTLE
WALKING IN HOSPITAL ROOM: A LITTLE
WALKING IN HOSPITAL ROOM: A LITTLE
STANDING UP FROM CHAIR USING ARMS: A LITTLE
WALKING IN HOSPITAL ROOM: A LITTLE
MOBILITY SCORE: 18
DRESSING REGULAR LOWER BODY CLOTHING: A LITTLE
MOVING TO AND FROM BED TO CHAIR: A LITTLE
CLIMB 3 TO 5 STEPS WITH RAILING: A LITTLE
MOVING TO AND FROM BED TO CHAIR: A LITTLE
STANDING UP FROM CHAIR USING ARMS: A LITTLE
MOBILITY SCORE: 18
MOVING FROM LYING ON BACK TO SITTING ON SIDE OF FLAT BED WITH BEDRAILS: A LITTLE
STANDING UP FROM CHAIR USING ARMS: A LITTLE
TURNING FROM BACK TO SIDE WHILE IN FLAT BAD: A LITTLE
WALKING IN HOSPITAL ROOM: A LITTLE
PERSONAL GROOMING: A LITTLE
CLIMB 3 TO 5 STEPS WITH RAILING: A LITTLE
MOVING TO AND FROM BED TO CHAIR: A LITTLE
MOBILITY SCORE: 18

## 2025-01-10 ASSESSMENT — PAIN DESCRIPTION - LOCATION
LOCATION: NECK

## 2025-01-10 ASSESSMENT — PAIN - FUNCTIONAL ASSESSMENT: PAIN_FUNCTIONAL_ASSESSMENT: 0-10

## 2025-01-10 NOTE — PROGRESS NOTES
[facility]  TRAUMA SERVICE - PROGRESS NOTE    Patient Name: Naveen Arauz  MRN: [id number]  Admit Date: 105  : 1945  AGE: 79 y.o.   GENDER: male  ==============================================================================  MECHANISM OF INJURY:   79M on xarelto for afib (last taken last night 5:30 pm) s/p mechanical fall down two steps. +head strike without LOC. Complained of neck pain, taken to [facility]. Pan scan notable for C6 fx with large spinal EDH. Grossly NVI since, moderate neck pain, no new numbness/tingling. Also started on CAP treatment, has had dyspnea for past few days, new oxygen requirement.      LOC (yes/no?): No  Anticoagulant / Anti-platelet Rx? (for what dx?): Xarelto  Referring Facility Name (N/A for scene EMR run): [facility]     INJURIES:   Nondisplaced C6 L lamina fx extending to articular facet  Large spinal epidural hematoma w/ severe cord compression/stenosis      OTHER MEDICAL PROBLEMS:  RSV CAP  HTN  HLD  COPD, not on home oxygen  Afib on xarelto  CAD s/p PCI mid RCA  TIA on ASA  Gout  GERD  BPH  Pulmonary HTN  Esophageal stricture  Lumbago     INCIDENTAL FINDINGS:  Lung nodule  Cholelithiasis  AAA     PROCEDURES:  C4-6 Laminectomy for epidural hematoma evacuation    ==============================================================================  TODAY'S ASSESSMENT AND PLAN OF CARE:    #C6 lamina fracture #Spinal epidural hematoma  - s/p 1.5 leminectomy with hematoma evacuation  - Epidural drain removed by NSGY   - Maintain cervical collar for 6 weeks   - Patient to remain in c-collar at all times. 2 and 6 week follow ups have been arranged. If patient is still in the hospital 2 weeks after surgery, please let neurosurgery team know.      #Analgesia  - Scheduled tylenol, Oxy 5/10 prn, Dilaudid breakthrough  - Adding Robaxin  and increasing analgesic dosages  - Increasing regimen . Patient on morphine baseline for severe arthritic  pain.      #GI #Esophageal stricture #GERD  - Formal swallow study & MBS 1/6, passed OK for diet (regular diet and thickened liquids). Repeat testing w/ SLP in 1-2 wks  - SLP updates: recommending advancing to baseline thin liquids, continue regular at much lower risk for aspiration related complications.   - Pantoprazole  - Bowel regimen     #CV #HLD #Afib #CAD s/p PCI mid RCA #h/o TIA  - Restart Aspirin POD#7, Restart Xarelto POD#14. Patient had surgery 1/5.  - MAP goal >65. Holding home metoprolol due to hypotension  - Atorvastatin     #COPD (not on home O2) #Pulmonary HTN #RSV PNA  - >92% on 5L NC. 1/10 now off O2 during day.   - DuoNeb prn  - Bcx 1/5 NGTDx1  - Continue azithromycin and ceftriaxone for 5 days (end 1/10). Improving exam. Likely end precautions 1/11.   - 1/9 1500mL spirometry     # #Hyponatremia, resolved #BPH  - Cr and BUN WNL     # Gout  - Hold home colchicine      #Heme  - Trend CBC  - Hematology s/o     #Endocrine  - Sliding scale insulin     #PT/OT  - Home care recommended  - FWW ordered     #DVTppx  - SCDs  - Enoxaparin     #Dispo  - Home with home health care. Likely going 1/11.  - 2 and 6 wk outpatient w/ NSGY    ==============================================================================  CHIEF COMPLAINT / OVERNIGHT EVENTS:   Improved pain control compared to previously. Off O2 at rest. Voiding without issue. Exam improving.     MEDICAL HISTORY / ROS:  Admission history and ROS reviewed.    PHYSICAL EXAM:  Heart Rate:  []   Temp:  [36 °C (96.8 °F)-36.8 °C (98.2 °F)]   Resp:  [16-18]   BP: (127-158)/(62-87)   SpO2:  [91 %-94 %]     Physical Exam  Vitals reviewed.   Constitutional:       General: He is not in acute distress.     Appearance: Normal appearance. He is normal weight. He is not ill-appearing, toxic-appearing or diaphoretic.   HENT:      Head: Normocephalic and atraumatic.      Right Ear: External ear normal.      Left Ear: External ear normal.      Nose: Nose normal.  No rhinorrhea.   Eyes:      General:         Right eye: No discharge.         Left eye: No discharge.      Conjunctiva/sclera: Conjunctivae normal.   Neck:      Comments: In cervical collar  Cardiovascular:      Rate and Rhythm: Normal rate and regular rhythm.      Heart sounds: Normal heart sounds. No murmur heard.  Pulmonary:      Effort: Pulmonary effort is normal. No respiratory distress.      Breath sounds: No stridor. Rhonchi present. No wheezing.      Comments: Lung exam remarkably improved compared to previous days.   Chest:      Chest wall: No tenderness.   Abdominal:      General: There is no distension.      Palpations: Abdomen is soft.      Tenderness: There is no guarding.   Musculoskeletal:      Cervical back: No rigidity or tenderness.      Right lower leg: No edema.      Left lower leg: No edema.   Skin:     General: Skin is warm.      Capillary Refill: Capillary refill takes less than 2 seconds.      Findings: Bruising present.   Neurological:      General: No focal deficit present.      Mental Status: He is alert and oriented to person, place, and time. Mental status is at baseline.      Sensory: No sensory deficit.   Psychiatric:         Mood and Affect: Mood normal.         Behavior: Behavior normal.         IMAGING SUMMARY:  no new imaging    LABS:  Results from last 7 days   Lab Units 01/10/25  0804 01/09/25  0745 01/08/25  0825 01/05/25  1759 01/05/25  0157   WBC AUTO x10*3/uL 9.5 9.5 9.1   < > 12.7*   HEMOGLOBIN g/dL 11.8* 12.6* 12.4*   < > 15.3   HEMATOCRIT % 35.1* 37.6* 37.4*   < > 45.4   PLATELETS AUTO x10*3/uL 149* 148* 126*   < > 142*   NEUTROS PCT AUTO %  --   --   --   --  67.2   LYMPHS PCT AUTO %  --   --   --   --  19.7   MONOS PCT AUTO %  --   --   --   --  9.2   EOS PCT AUTO %  --   --   --   --  1.6    < > = values in this interval not displayed.     Results from last 7 days   Lab Units 01/07/25  0029 01/06/25  0205 01/05/25  1759   APTT seconds 29 31 33   INR  1.3* 1.3* 1.4*      Results from last 7 days   Lab Units 01/10/25  0804 01/09/25  0745 01/08/25  0825 01/05/25  1759 01/05/25  1045 01/05/25  0157   SODIUM mmol/L 137 134* 134*   < > 131* 132*   POTASSIUM mmol/L 4.2 4.3 3.9   < > 4.2 4.2   CHLORIDE mmol/L 100 100 99   < > 94* 95*   CO2 mmol/L 28 25 29   < > 27 27   BUN mg/dL 10 10 10   < > 12 10   CREATININE mg/dL 0.66 0.55 0.60   < > 0.78 0.79   CALCIUM mg/dL 8.7 8.8 8.8   < > 8.7 8.7   PROTEIN TOTAL g/dL  --   --   --   --  6.8 7.1   BILIRUBIN TOTAL mg/dL  --   --   --   --  0.8 0.8   ALK PHOS U/L  --   --   --   --  129 138*   ALT U/L  --   --   --   --  28 30   AST U/L  --   --   --   --  47* 37   GLUCOSE mg/dL 111* 119* 126*   < > 184* 155*    < > = values in this interval not displayed.     Results from last 7 days   Lab Units 01/05/25  1045 01/05/25  0157   BILIRUBIN TOTAL mg/dL 0.8 0.8     Results from last 7 days   Lab Units 01/06/25  0205 01/05/25  1800 01/05/25  1538   POCT PH, ARTERIAL pH 7.41 7.40 7.33*   POCT PCO2, ARTERIAL mm Hg 44* 42 52*   POCT PO2, ARTERIAL mm Hg 81* 65* 174*   POCT HCO3 CALCULATED, ARTERIAL mmol/L 27.9* 26.0 27.4*   POCT BASE EXCESS, ARTERIAL mmol/L 2.8 1.0 0.6       I have reviewed all medications, laboratory results, and imaging pertinent for today's encounter.

## 2025-01-10 NOTE — PROGRESS NOTES
Inpatient SLP Swallow Treatment     Patient Name: Naveen Arauz  MRN: [id number]  Today's Date: 1/10/2025  Time Calculation  Start Time: 0908  Stop Time: 0925  Time Calculation (min): 17 min       Recommendations:   Regular (IDDSI Level 7)  Thin Liquids (IDDSI Level 0)     - Small, single sips  - Alternate consistencies  - Double swallow w/ solids  -Cough and Re-swallow every 5-7 bites/sips   -Frequent oral care       SLP Assessment:  Pt w/ overall improvement in clinical status.     Based on pt's MBSS results/hx chronic dysphagia he will likely continue to be at risk for occasional min. aspiration during meals. However, pt has protective factors resulting in low risk for developing a respiratory complication 2/2 aspiration including intact cognition/able to follow strategies, good oral care, and he's ambulatory (w/ assist). Recommend advancing to baseline thin liquids, continue w/ regular solids w/ use of strategies. Recommend continue SLP for ongoing strategy training and to target RMST to improve cough strength. Additionally, recommend monitoring closely for aspiration related complications and repeat MBSS if pt develop's a PNA.         Plan:  Skilled SLP Services: Skilled SLP intervention for dysphagia is warranted.  SLP Frequency: 2x per week  Duration: 30 days  Discussed POC: Patient, Nursing, Physician  SLP - OK to Discharge: Yes    Goals  Patient/Family will verbalize/demonstrate comprehension of dysphagia education, strategies, recommendations and POC with 80% accuracy independently.              Start: 01/06/25, End: 02/06/25    Progressing      Pt will accurately utilize/recall recommended swallow strategies/guidelines independently in >90% opportunities.               Start: 01/06/25, End: 02/06/25     Progressing     Patient will complete respiratory muscle strength training (RMST) adhering to progressive overload protocol with cues as needed for accurate completion.              Start: 01/06/25, End:  02/06/25    Not yet targeted           Subjective  Pt upright and alert in chair, willing to participate in therapy. Aspen collar in place. Breathing on room air.      Pt reports he has been doing well w/ diet but would like to advance to thin liquids.         Objective  Therapeutic Swallow Therapy:  Briefly re-educated pt on recommended strategies which he stated he has been utilizing.      Pt then utilized strategies in context of trials of regular solid (crackers) and thin liquid water via straw, given min cues/clarification for adequate strategy use. Noted x1 instance of cough (unclear whether spontaneous vs volitional as recommended) and occasional throat clears, possibly indicative of aspiration. Pt denied overt difficulty.     Pt also educated extensively modifiable factors for reducing risk of aspiration including optimizing mobility as safely tolerated and engaging in routine/aggressive oral care. Pt asked appropriate questions and indicated understanding.

## 2025-01-10 NOTE — PROGRESS NOTES
Physical Therapy Treatment    Patient Name: Naveen Arauz  MRN: [id number]  Today's Date: 1/10/2025  Room: [facility]76A  Time Calculation  Start Time: 1115  Stop Time: 1148  Time Calculation (min): 33 min       Assessment/Plan   PT Assessment  PT Assessment Results: Decreased strength, Decreased endurance, Impaired balance, Decreased mobility, Decreased coordination, Impaired sensation, Pain, Orthopedic restrictions  Rehab Prognosis: Good  Barriers to Discharge Home: Physical needs  Physical Needs: Intermittent mobility assistance needed, High falls risk due to function or environment, Intermittent ADL assistance needed  Evaluation/Treatment Tolerance: Patient tolerated treatment well  Medical Staff Made Aware: Yes  Strengths: Ability to acquire knowledge, Attitude of self  Barriers to Participation: Comorbidities  End of Session Communication: Bedside nurse  End of Session Patient Position: Up in chair     PT Plan  Treatment/Interventions: Bed mobility, Transfer training, Gait training, Stair training, Balance training, Strengthening, Endurance training, Range of motion, Therapeutic exercise, Therapeutic activity, Neuromuscular re-education, Positioning, Postural re-education, Home exercise program  PT Plan: Ongoing PT  PT Frequency: 5 times per week  PT Discharge Recommendations: Low intensity level of continued care  Equipment Recommended upon Discharge:  (TBD)  PT Recommended Transfer Status: Assist x1  PT - OK to Discharge: Yes  Assessment: Patient is progressing Well with therapy this date. Increased gait distance today, Would continue to benefit from continued skilled PT to address all mobility deficits; Patient remains appropriate for LOW intensity therapy when medically ready for discharge from acute stay.  Will continue to follow.     General Visit Information:   PT  Visit  PT Received On: 01/10/25  Prior to Session Communication: Bedside nurse  Patient Position Received: Up in chair     Subjective    Subjective: Pt pleasant and agreeable to therapy upon approach    Precautions:  Precautions  Medical Precautions: Fall precautions  Post-Surgical Precautions: Spinal precautions  Braces Applied: C-collar at all times  Vital Signs:  Vital Signs (Past 2hrs)        Date/Time Vitals Session Patient Position Pulse Resp SpO2 BP MAP (mmHg)    01/10/25 1115 During PT  --  --  --  95 %  --  --                     Objective   Pain:  Pain Assessment  Pain Assessment: 0-10  0-10 (Numeric) Pain Score: 5 - Moderate pain  Pain Type: Acute pain  Pain Location: Neck  Cognition:  Cognition  Overall Cognitive Status: Within Functional Limits  Orientation Level: Oriented X4    Lines/Tubes/Drains:  External Urinary Catheter (Active)   Number of days: 4     PT Treatments:  Therapeutic Exercise  Therapeutic Exercise Activity 1: 10x IS, with cues for proper use. Able to achieve 1500ml with good technique  Therapeutic Activity  Therapeutic Activity 1: Educated pt on making sure he gets an extra set of neck collar pads for proper cleaning/hygiene purposes. Educated on reducing fall risk at home, nightlights, clearing rugs etc. Encouraged ambulation ~1x per hour with SUP.        Ambulation/Gait Training 1  Surface 1: Level tile  Device 1: Rolling walker  Assistance 1: Close supervision  Quality of Gait 1: Decreased step length (decreased ariel, foot clearance)  Comments/Distance (ft) 1: 75', 200'  Transfer 1  Transfer From 1: Sit to  Transfer to 1: Stand  Technique 1: Sit to stand, Stand to sit  Transfer Device 1: Walker  Transfer Level of Assistance 1: Close supervision  Trials/Comments 1: x3  Transfers 2  Transfer From 2: Stand to  Transfer to 2: Chair with arms, Chair without arms  Technique 2: Stand pivot  Transfer Device 2: Walker  Transfer Level of Assistance 2: Close supervision  Trials/Comments 2: vc for safety backing up to chair             Activity tolerance:  Activity Tolerance  Endurance: Tolerates 10 - 20 min exercise with  multiple rests    Outcome Measures:  [facility] Basic Mobility  Turning from your back to your side while in a flat bed without using bedrails: A little  Moving from lying on your back to sitting on the side of a flat bed without using bedrails: A little  Moving to and from bed to chair (including a wheelchair): A little  Standing up from a chair using your arms (e.g. wheelchair or bedside chair): A little  To walk in hospital room: A little  Climbing 3-5 steps with railing: A little  Basic Mobility - Total Score: 18     Education Documentation  Handouts, taught by Tiffanie Slaughter PTA at 1/10/2025 12:16 PM.  Learner: Patient  Readiness: Acceptance  Method: Explanation  Response: Verbalizes Understanding    Body Mechanics, taught by Tiffanie Slaughter PTA at 1/10/2025 12:16 PM.  Learner: Patient  Readiness: Acceptance  Method: Explanation  Response: Verbalizes Understanding    Precautions, taught by Tiffanie Slaughter PTA at 1/10/2025 12:16 PM.  Learner: Patient  Readiness: Acceptance  Method: Explanation  Response: Verbalizes Understanding    Home Exercise Program, taught by Tiffanie Slaughter PTA at 1/10/2025 12:16 PM.  Learner: Patient  Readiness: Acceptance  Method: Explanation  Response: Verbalizes Understanding    ADL Training, taught by Tiffanie Slaughter PTA at 1/10/2025 12:16 PM.  Learner: Patient  Readiness: Acceptance  Method: Explanation  Response: Verbalizes Understanding    Precautions, taught by Tiffanie Slaughter PTA at 1/10/2025 12:16 PM.  Learner: Patient  Readiness: Acceptance  Method: Explanation  Response: Verbalizes Understanding    Body Mechanics, taught by Tiffanie Slaughter PTA at 1/10/2025 12:16 PM.  Learner: Patient  Readiness: Acceptance  Method: Explanation  Response: Verbalizes Understanding    Mobility Training, taught by Tiffanie Slaughter PTA at 1/10/2025 12:16 PM.  Learner: Patient  Readiness: Acceptance  Method: Explanation  Response: Verbalizes Understanding    Education Comments  No comments found.          OP EDUCATION:        Encounter Problems       Encounter Problems (Active)       Balance       Pt will score >24/28 on Tinetti indicating low fall risk  (Progressing)       Start:  01/06/25    Expected End:  01/20/25               Mobility       STG - Patient will ambulate >150ft using LRAD Theresa (Progressing)       Start:  01/06/25    Expected End:  01/20/25            STG - Patient will ascend and descend a flight of stairs Theresa (Progressing)       Start:  01/06/25    Expected End:  01/20/25               PT Transfers       Pt will complete all functional transfers independently  (Progressing)       Start:  01/06/25    Expected End:  01/20/25               Pain - Adult

## 2025-01-10 NOTE — CARE PLAN
The patient's goals for the shift include      The clinical goals for the shift include pt will have pain controlled

## 2025-01-10 NOTE — PROGRESS NOTES
Transitional Care Coordinator Note: Patient discussed with medical team (trauma resident), per trauma team patient is not medically ready. ADOD: 1/11 Discharge dispo: Patient evaluated by therapy team rec low intensity and FWW. [facility] informed and educated patient and patient's wife (Karla Arauz [phone]) on therapy recs. Patient and Karla expressed understanding and agreeable to discharge plan, selected home with HC. TCC offered HC vs HC list. Patient's wife declined HC list and requested [facility] as AO and [facility] as second choice. Karla provided service address as [address] [place] [address]. Patient in service are for [facility]  team. TCC faxed referral to [facility] [phone] as facility is not online in [facility]. TCC place call to [facility] [phone] and spoke with intake nurse. [facility] reviewing case pending acceptance. [facility] sent request to [facility] and requested for HC team to review for possible acceptance and insurance verification. [facility] informed of ADOD and that agency is second choice. [facility] updated trauma team and requested for team to place diagnosis code on FWW script to have script sent to [facility] to have FWW dispensed from onsite closet.     Edgar Avilez RN BSN   Transitional Care Coordinator     ADDENDUM 1/10/2025 12:25  FWW script updated and referral sent to [facility] awaiting response. [facility] accepted, pending insurance. Patient remains under review at [facility].     Edgar TERRAZASN   Transitional Care Coordinator       ADDENDUM 1/10/393957:54  TCC received call from Katy from [facility]. Per Katy agency able to accept patient and provided SOC for Monday 113/2025 pending patient discharges 1/11/2025. Agency provided with patient's service address. TCC placed call to patient's wife Karla Arauz [phone] to update Karla on discharge plan and HC acceptance. Karla  expressed understanding and appreciation of information and is agreeable to discharge plan. Patient's wife to assist with transportation home. FWW approved by Tara. FWW dispensed from [facility] and provided to patient. [facility] requested for AVS to be faxed upon discharge. [facility] team updated to close referral.       Edgar Avilez RN BSN   Transitional Care Coordinator

## 2025-01-10 NOTE — CARE PLAN
The patient's goals for the shift include      The clinical goals for the shift include   Problem: Fall/Injury  Goal: Not fall by end of shift  Outcome: Progressing  Goal: Be free from injury by end of the shift  Outcome: Progressing  Goal: Verbalize understanding of personal risk factors for fall in the hospital  Outcome: Progressing  Goal: Verbalize understanding of risk factor reduction measures to prevent injury from fall in the home  Outcome: Progressing  Goal: Use assistive devices by end of the shift  Outcome: Progressing  Goal: Pace activities to prevent fatigue by end of the shift  Outcome: Progressing     Problem: Skin  Goal: Decreased wound size/increased tissue granulation at next dressing change  Outcome: Progressing  Goal: Participates in plan/prevention/treatment measures  Outcome: Progressing  Goal: Prevent/manage excess moisture  Outcome: Progressing  Goal: Prevent/minimize sheer/friction injuries  Outcome: Progressing  Goal: Promote/optimize nutrition  Outcome: Progressing  Goal: Promote skin healing  Outcome: Progressing     Problem: Pain  Goal: Takes deep breaths with improved pain control throughout the shift  Outcome: Progressing  Goal: Turns in bed with improved pain control throughout the shift  Outcome: Progressing  Goal: Walks with improved pain control throughout the shift  Outcome: Progressing  Goal: Performs ADL's with improved pain control throughout shift  Outcome: Progressing  Goal: Participates in PT with improved pain control throughout the shift  Outcome: Progressing  Goal: Free from opioid side effects throughout the shift  Outcome: Progressing  Goal: Free from acute confusion related to pain meds throughout the shift  Outcome: Progressing     Problem: Pain - Adult  Goal: Verbalizes/displays adequate comfort level or baseline comfort level  Outcome: Progressing     Problem: Safety - Adult  Goal: Free from fall injury  Outcome: Progressing     Problem: Discharge Planning  Goal:  Discharge to home or other facility with appropriate resources  Outcome: Progressing     Problem: Chronic Conditions and Co-morbidities  Goal: Patient's chronic conditions and co-morbidity symptoms are monitored and maintained or improved  Outcome: Progressing

## 2025-01-11 LAB
ABO GROUP (TYPE) IN BLOOD: NORMAL
ALBUMIN SERPL BCP-MCNC: 3.6 G/DL (ref 3.4–5)
ANION GAP SERPL CALC-SCNC: 12 MMOL/L (ref 10–20)
ANTIBODY SCREEN: NORMAL
BUN SERPL-MCNC: 11 MG/DL (ref 6–23)
CALCIUM SERPL-MCNC: 8.9 MG/DL (ref 8.6–10.6)
CHLORIDE SERPL-SCNC: 98 MMOL/L (ref 98–107)
CO2 SERPL-SCNC: 27 MMOL/L (ref 21–32)
CREAT SERPL-MCNC: 0.71 MG/DL (ref 0.5–1.3)
EGFRCR SERPLBLD CKD-EPI 2021: >90 ML/MIN/1.73M*2
ERYTHROCYTE [DISTWIDTH] IN BLOOD BY AUTOMATED COUNT: 13 % (ref 11.5–14.5)
GLUCOSE BLD MANUAL STRIP-MCNC: 130 MG/DL (ref 74–99)
GLUCOSE BLD MANUAL STRIP-MCNC: 140 MG/DL (ref 74–99)
GLUCOSE BLD MANUAL STRIP-MCNC: 164 MG/DL (ref 74–99)
GLUCOSE BLD MANUAL STRIP-MCNC: 183 MG/DL (ref 74–99)
GLUCOSE SERPL-MCNC: 139 MG/DL (ref 74–99)
HCT VFR BLD AUTO: 37.4 % (ref 41–52)
HGB BLD-MCNC: 12.5 G/DL (ref 13.5–17.5)
MAGNESIUM SERPL-MCNC: 2.08 MG/DL (ref 1.6–2.4)
MCH RBC QN AUTO: 33.3 PG (ref 26–34)
MCHC RBC AUTO-ENTMCNC: 33.4 G/DL (ref 32–36)
MCV RBC AUTO: 100 FL (ref 80–100)
NRBC BLD-RTO: 0 /100 WBCS (ref 0–0)
PHOSPHATE SERPL-MCNC: 3 MG/DL (ref 2.5–4.9)
PLATELET # BLD AUTO: 198 X10*3/UL (ref 150–450)
POTASSIUM SERPL-SCNC: 4 MMOL/L (ref 3.5–5.3)
RBC # BLD AUTO: 3.75 X10*6/UL (ref 4.5–5.9)
RH FACTOR (ANTIGEN D): NORMAL
SODIUM SERPL-SCNC: 133 MMOL/L (ref 136–145)
WBC # BLD AUTO: 10.2 X10*3/UL (ref 4.4–11.3)

## 2025-01-11 PROCEDURE — 2500000001 HC RX 250 WO HCPCS SELF ADMINISTERED DRUGS (ALT 637 FOR MEDICARE OP)

## 2025-01-11 PROCEDURE — 82947 ASSAY GLUCOSE BLOOD QUANT: CPT

## 2025-01-11 PROCEDURE — 2500000004 HC RX 250 GENERAL PHARMACY W/ HCPCS (ALT 636 FOR OP/ED): Performed by: STUDENT IN AN ORGANIZED HEALTH CARE EDUCATION/TRAINING PROGRAM

## 2025-01-11 PROCEDURE — 2500000002 HC RX 250 W HCPCS SELF ADMINISTERED DRUGS (ALT 637 FOR MEDICARE OP, ALT 636 FOR OP/ED)

## 2025-01-11 PROCEDURE — 83735 ASSAY OF MAGNESIUM: CPT

## 2025-01-11 PROCEDURE — 36415 COLL VENOUS BLD VENIPUNCTURE: CPT

## 2025-01-11 PROCEDURE — 80069 RENAL FUNCTION PANEL: CPT

## 2025-01-11 PROCEDURE — 85027 COMPLETE CBC AUTOMATED: CPT

## 2025-01-11 PROCEDURE — 2500000001 HC RX 250 WO HCPCS SELF ADMINISTERED DRUGS (ALT 637 FOR MEDICARE OP): Performed by: STUDENT IN AN ORGANIZED HEALTH CARE EDUCATION/TRAINING PROGRAM

## 2025-01-11 PROCEDURE — 2500000004 HC RX 250 GENERAL PHARMACY W/ HCPCS (ALT 636 FOR OP/ED)

## 2025-01-11 PROCEDURE — 1100000001 HC PRIVATE ROOM DAILY

## 2025-01-11 PROCEDURE — 86901 BLOOD TYPING SEROLOGIC RH(D): CPT

## 2025-01-11 RX ADMIN — ACETAMINOPHEN 650 MG: 325 TABLET ORAL at 23:50

## 2025-01-11 RX ADMIN — HYDROMORPHONE HYDROCHLORIDE 0.6 MG: 1 INJECTION, SOLUTION INTRAMUSCULAR; INTRAVENOUS; SUBCUTANEOUS at 19:32

## 2025-01-11 RX ADMIN — DOCUSATE SODIUM 100 MG: 100 CAPSULE, LIQUID FILLED ORAL at 08:35

## 2025-01-11 RX ADMIN — ENOXAPARIN SODIUM 30 MG: 100 INJECTION SUBCUTANEOUS at 08:36

## 2025-01-11 RX ADMIN — OXYCODONE 15 MG: 5 TABLET ORAL at 05:06

## 2025-01-11 RX ADMIN — ACETAMINOPHEN 650 MG: 325 TABLET ORAL at 17:46

## 2025-01-11 RX ADMIN — OXYCODONE HYDROCHLORIDE 15 MG: 10 TABLET ORAL at 00:22

## 2025-01-11 RX ADMIN — ACETAMINOPHEN 650 MG: 325 TABLET ORAL at 09:56

## 2025-01-11 RX ADMIN — INSULIN LISPRO 1 UNITS: 100 INJECTION, SOLUTION INTRAVENOUS; SUBCUTANEOUS at 17:46

## 2025-01-11 RX ADMIN — ATORVASTATIN CALCIUM 40 MG: 40 TABLET, FILM COATED ORAL at 23:38

## 2025-01-11 RX ADMIN — POLYETHYLENE GLYCOL 3350 17 G: 17 POWDER, FOR SOLUTION ORAL at 08:35

## 2025-01-11 RX ADMIN — OXYCODONE 15 MG: 5 TABLET ORAL at 17:46

## 2025-01-11 RX ADMIN — ENOXAPARIN SODIUM 30 MG: 100 INJECTION SUBCUTANEOUS at 23:38

## 2025-01-11 RX ADMIN — HYDROMORPHONE HYDROCHLORIDE 0.6 MG: 1 INJECTION, SOLUTION INTRAMUSCULAR; INTRAVENOUS; SUBCUTANEOUS at 08:44

## 2025-01-11 RX ADMIN — OXYCODONE 15 MG: 5 TABLET ORAL at 23:37

## 2025-01-11 RX ADMIN — METHOCARBAMOL TABLETS 500 MG: 500 TABLET, COATED ORAL at 05:07

## 2025-01-11 RX ADMIN — OXYCODONE 15 MG: 5 TABLET ORAL at 14:03

## 2025-01-11 RX ADMIN — DOCUSATE SODIUM 100 MG: 100 CAPSULE, LIQUID FILLED ORAL at 23:37

## 2025-01-11 RX ADMIN — OXYCODONE 15 MG: 5 TABLET ORAL at 09:56

## 2025-01-11 RX ADMIN — METHOCARBAMOL TABLETS 500 MG: 500 TABLET, COATED ORAL at 23:37

## 2025-01-11 RX ADMIN — METHOCARBAMOL TABLETS 500 MG: 500 TABLET, COATED ORAL at 14:03

## 2025-01-11 RX ADMIN — PANTOPRAZOLE SODIUM 40 MG: 40 TABLET, DELAYED RELEASE ORAL at 08:35

## 2025-01-11 ASSESSMENT — PAIN SCALES - GENERAL
PAINLEVEL_OUTOF10: 9
PAINLEVEL_OUTOF10: 10 - WORST POSSIBLE PAIN
PAINLEVEL_OUTOF10: 8
PAINLEVEL_OUTOF10: 9
PAINLEVEL_OUTOF10: 9
PAINLEVEL_OUTOF10: 10 - WORST POSSIBLE PAIN
PAINLEVEL_OUTOF10: 10 - WORST POSSIBLE PAIN

## 2025-01-11 ASSESSMENT — PAIN DESCRIPTION - LOCATION
LOCATION: SHOULDER
LOCATION: NECK

## 2025-01-11 ASSESSMENT — COGNITIVE AND FUNCTIONAL STATUS - GENERAL
MOVING TO AND FROM BED TO CHAIR: A LITTLE
CLIMB 3 TO 5 STEPS WITH RAILING: A LITTLE
EATING MEALS: A LITTLE
TURNING FROM BACK TO SIDE WHILE IN FLAT BAD: A LITTLE
DRESSING REGULAR UPPER BODY CLOTHING: A LITTLE
HELP NEEDED FOR BATHING: A LITTLE
MOBILITY SCORE: 18
STANDING UP FROM CHAIR USING ARMS: A LITTLE
DAILY ACTIVITIY SCORE: 18
WALKING IN HOSPITAL ROOM: A LITTLE
DRESSING REGULAR LOWER BODY CLOTHING: A LITTLE
TOILETING: A LITTLE
PERSONAL GROOMING: A LITTLE
MOVING FROM LYING ON BACK TO SITTING ON SIDE OF FLAT BED WITH BEDRAILS: A LITTLE

## 2025-01-11 ASSESSMENT — PAIN DESCRIPTION - ORIENTATION: ORIENTATION: LEFT

## 2025-01-11 NOTE — PROGRESS NOTES
[facility]  TRAUMA SERVICE - PROGRESS NOTE    Patient Name: Naveen Arauz  MRN: [id number]  Admit Date: 105  : 1945  AGE: 79 y.o.   GENDER: male  ==============================================================================  MECHANISM OF INJURY:   79M on xarelto for afib (last taken last night 5:30 pm) s/p mechanical fall down two steps. +head strike without LOC. Complained of neck pain, taken to [facility]. Pan scan notable for C6 fx with large spinal EDH. Grossly NVI since, moderate neck pain, no new numbness/tingling. Also started on CAP treatment, has had dyspnea for past few days, new oxygen requirement.      LOC (yes/no?): No  Anticoagulant / Anti-platelet Rx? (for what dx?): Xarelto  Referring Facility Name (N/A for scene EMR run): [facility]     INJURIES:   Nondisplaced C6 L lamina fx extending to articular facet  Large spinal epidural hematoma w/ severe cord compression/stenosis      OTHER MEDICAL PROBLEMS:  RSV CAP  HTN  HLD  COPD, not on home oxygen  Afib on xarelto  CAD s/p PCI mid RCA  TIA on ASA  Gout  GERD  BPH  Pulmonary HTN  Esophageal stricture  Lumbago     INCIDENTAL FINDINGS:  Lung nodule  Cholelithiasis  AAA     PROCEDURES:  C4-6 Laminectomy for epidural hematoma evacuation    ==============================================================================  TODAY'S ASSESSMENT AND PLAN OF CARE:    #C6 lamina fracture #Spinal epidural hematoma  - s/p 1.5 leminectomy with hematoma evacuation  - Epidural drain removed by NSGY   - Maintain cervical collar for 6 weeks      #Analgesia  - Scheduled tylenol, Robaxin,  Oxy 15 q4h, 0.6mg Dilaudid breakthrough (Patient on morphine baseline for severe arthritic pain. )     #GI #Esophageal stricture #GERD  - Formal swallow study & MBS , passed OK for diet (regular diet and thickened liquids). Repeat testing w/ SLP in 1-2 wks  - SLP updates: recommending advancing to baseline thin liquids, continue regular at  much lower risk for aspiration related complications.   - Pantoprazole  - Bowel regimen     #CV #HLD #Afib #CAD s/p PCI mid RCA #h/o TIA  - Restart Aspirin POD#7, Restart Xarelto POD#14. Patient had surgery 1/5.  - MAP goal >65. Holding home metoprolol due to hypotension  - Atorvastatin     #COPD (not on home O2) #Pulmonary HTN #RSV PNA  - Weaned to RA  - DuoNeb prn  - Bcx 1/5 NGTDx1  - Continue azithromycin and ceftriaxone for 5 days (end 1/10). Improving exam. end precautions 1/11.   - 1/9 1500mL spirometry     # #Hyponatremia, resolved #BPH  - Cr and BUN WNL     # Gout  - Hold home colchicine      #Heme  - Trend CBC  - Hematology s/o     #Endocrine  - Sliding scale insulin     #PT/OT  - Home care recommended  - FWW ordered     #DVTppx  - SCDs  - Enoxaparin     #Dispo  - Home with home health care. Likely going 1/12. Medically ready.  - Patient to remain in c-collar at all times. 2 and 6 week follow ups have been arranged with NSGY. If patient is still in the hospital 2 weeks after surgery, please let neurosurgery team know.     ==============================================================================  CHIEF COMPLAINT / OVERNIGHT EVENTS:   No overnight events on signout. Patient has no acute concerns. Patient  reports continued pain due to falling behind scheduled pain medications.     MEDICAL HISTORY / ROS:  Admission history and ROS reviewed.    PHYSICAL EXAM:  Heart Rate:  []   Temp:  [36.2 °C (97.1 °F)-36.6 °C (97.8 °F)]   Resp:  [17-18]   BP: (125-166)/(78-90)   SpO2:  [94 %-96 %]     Physical Exam  Vitals reviewed.   Constitutional:       General: He is not in acute distress.     Appearance: Normal appearance. He is normal weight. He is not ill-appearing, toxic-appearing or diaphoretic.   HENT:      Head: Normocephalic and atraumatic.      Right Ear: External ear normal.      Left Ear: External ear normal.      Nose: Nose normal. No rhinorrhea.   Eyes:      General:         Right eye: No  discharge.         Left eye: No discharge.      Extraocular Movements: Extraocular movements intact.      Conjunctiva/sclera: Conjunctivae normal.   Neck:      Comments: In cervical collar  Cardiovascular:      Rate and Rhythm: Normal rate and regular rhythm.      Heart sounds: Normal heart sounds. No murmur heard.  Pulmonary:      Effort: Pulmonary effort is normal. No respiratory distress.      Breath sounds: Normal breath sounds. No stridor. No wheezing or rhonchi.      Comments: Significantly improved pulmonary exam!  Chest:      Chest wall: No tenderness.   Abdominal:      General: Abdomen is flat. There is no distension.      Tenderness: There is no abdominal tenderness. There is no guarding.   Musculoskeletal:         General: No signs of injury.      Cervical back: No tenderness.      Right lower leg: No edema.      Left lower leg: No edema.   Skin:     General: Skin is warm and dry.      Capillary Refill: Capillary refill takes less than 2 seconds.      Coloration: Skin is not jaundiced.      Findings: Bruising present. No rash.      Comments: Generalized bruising   Neurological:      General: No focal deficit present.      Mental Status: He is alert and oriented to person, place, and time. Mental status is at baseline.   Psychiatric:         Mood and Affect: Mood normal.         Behavior: Behavior normal.         IMAGING SUMMARY:  no new imaging    LABS:  Results from last 7 days   Lab Units 01/11/25  0806 01/10/25  0804 01/09/25  0745 01/05/25  1759 01/05/25  0157   WBC AUTO x10*3/uL 10.2 9.5 9.5   < > 12.7*   HEMOGLOBIN g/dL 12.5* 11.8* 12.6*   < > 15.3   HEMATOCRIT % 37.4* 35.1* 37.6*   < > 45.4   PLATELETS AUTO x10*3/uL 198 149* 148*   < > 142*   NEUTROS PCT AUTO %  --   --   --   --  67.2   LYMPHS PCT AUTO %  --   --   --   --  19.7   MONOS PCT AUTO %  --   --   --   --  9.2   EOS PCT AUTO %  --   --   --   --  1.6    < > = values in this interval not displayed.     Results from last 7 days   Lab  Units 01/07/25  0029 01/06/25  0205 01/05/25  1759   APTT seconds 29 31 33   INR  1.3* 1.3* 1.4*     Results from last 7 days   Lab Units 01/11/25  0806 01/10/25  0804 01/09/25  0745 01/05/25  1759 01/05/25  1045 01/05/25  0157   SODIUM mmol/L 133* 137 134*   < > 131* 132*   POTASSIUM mmol/L 4.0 4.2 4.3   < > 4.2 4.2   CHLORIDE mmol/L 98 100 100   < > 94* 95*   CO2 mmol/L 27 28 25   < > 27 27   BUN mg/dL 11 10 10   < > 12 10   CREATININE mg/dL 0.71 0.66 0.55   < > 0.78 0.79   CALCIUM mg/dL 8.9 8.7 8.8   < > 8.7 8.7   PROTEIN TOTAL g/dL  --   --   --   --  6.8 7.1   BILIRUBIN TOTAL mg/dL  --   --   --   --  0.8 0.8   ALK PHOS U/L  --   --   --   --  129 138*   ALT U/L  --   --   --   --  28 30   AST U/L  --   --   --   --  47* 37   GLUCOSE mg/dL 139* 111* 119*   < > 184* 155*    < > = values in this interval not displayed.     Results from last 7 days   Lab Units 01/05/25  1045 01/05/25  0157   BILIRUBIN TOTAL mg/dL 0.8 0.8     Results from last 7 days   Lab Units 01/06/25  0205 01/05/25  1800 01/05/25  1538   POCT PH, ARTERIAL pH 7.41 7.40 7.33*   POCT PCO2, ARTERIAL mm Hg 44* 42 52*   POCT PO2, ARTERIAL mm Hg 81* 65* 174*   POCT HCO3 CALCULATED, ARTERIAL mmol/L 27.9* 26.0 27.4*   POCT BASE EXCESS, ARTERIAL mmol/L 2.8 1.0 0.6       I have reviewed all medications, laboratory results, and imaging pertinent for today's encounter.

## 2025-01-11 NOTE — CARE PLAN
Transitional Care Coordinator Note: Patient discussed with medical team, per medical team patient is medically ready. Discharge dispo: HC with [facility]. SOC 1/13 Per TCC.  ADOD 1/12  Beatriz Cheng RN  Transitional Care Coordinator

## 2025-01-12 ENCOUNTER — PHARMACY VISIT (OUTPATIENT)
Dept: PHARMACY | Facility: CLINIC | Age: 80
End: 2025-01-12
Payer: MEDICARE

## 2025-01-12 VITALS
RESPIRATION RATE: 17 BRPM | DIASTOLIC BLOOD PRESSURE: 74 MMHG | TEMPERATURE: 98.2 F | WEIGHT: 178.57 LBS | BODY MASS INDEX: 24.19 KG/M2 | OXYGEN SATURATION: 94 % | HEIGHT: 72 IN | HEART RATE: 98 BPM | SYSTOLIC BLOOD PRESSURE: 166 MMHG

## 2025-01-12 LAB
ALBUMIN SERPL BCP-MCNC: 3.5 G/DL (ref 3.4–5)
ANION GAP SERPL CALC-SCNC: 14 MMOL/L (ref 10–20)
BUN SERPL-MCNC: 10 MG/DL (ref 6–23)
CALCIUM SERPL-MCNC: 9 MG/DL (ref 8.6–10.6)
CHLORIDE SERPL-SCNC: 99 MMOL/L (ref 98–107)
CO2 SERPL-SCNC: 25 MMOL/L (ref 21–32)
CREAT SERPL-MCNC: 0.71 MG/DL (ref 0.5–1.3)
EGFRCR SERPLBLD CKD-EPI 2021: >90 ML/MIN/1.73M*2
ERYTHROCYTE [DISTWIDTH] IN BLOOD BY AUTOMATED COUNT: 13.1 % (ref 11.5–14.5)
GLUCOSE BLD MANUAL STRIP-MCNC: 147 MG/DL (ref 74–99)
GLUCOSE SERPL-MCNC: 156 MG/DL (ref 74–99)
HCT VFR BLD AUTO: 38.1 % (ref 41–52)
HGB BLD-MCNC: 12.8 G/DL (ref 13.5–17.5)
MAGNESIUM SERPL-MCNC: 2.11 MG/DL (ref 1.6–2.4)
MCH RBC QN AUTO: 33.2 PG (ref 26–34)
MCHC RBC AUTO-ENTMCNC: 33.6 G/DL (ref 32–36)
MCV RBC AUTO: 99 FL (ref 80–100)
NRBC BLD-RTO: 0 /100 WBCS (ref 0–0)
PHOSPHATE SERPL-MCNC: 3.5 MG/DL (ref 2.5–4.9)
PLATELET # BLD AUTO: 223 X10*3/UL (ref 150–450)
POTASSIUM SERPL-SCNC: 4.2 MMOL/L (ref 3.5–5.3)
RBC # BLD AUTO: 3.86 X10*6/UL (ref 4.5–5.9)
SODIUM SERPL-SCNC: 134 MMOL/L (ref 136–145)
WBC # BLD AUTO: 8 X10*3/UL (ref 4.4–11.3)

## 2025-01-12 PROCEDURE — 2500000001 HC RX 250 WO HCPCS SELF ADMINISTERED DRUGS (ALT 637 FOR MEDICARE OP)

## 2025-01-12 PROCEDURE — 83735 ASSAY OF MAGNESIUM: CPT

## 2025-01-12 PROCEDURE — 36415 COLL VENOUS BLD VENIPUNCTURE: CPT

## 2025-01-12 PROCEDURE — 2500000004 HC RX 250 GENERAL PHARMACY W/ HCPCS (ALT 636 FOR OP/ED)

## 2025-01-12 PROCEDURE — 85027 COMPLETE CBC AUTOMATED: CPT

## 2025-01-12 PROCEDURE — RXMED WILLOW AMBULATORY MEDICATION CHARGE

## 2025-01-12 PROCEDURE — 82947 ASSAY GLUCOSE BLOOD QUANT: CPT

## 2025-01-12 PROCEDURE — 2500000001 HC RX 250 WO HCPCS SELF ADMINISTERED DRUGS (ALT 637 FOR MEDICARE OP): Performed by: STUDENT IN AN ORGANIZED HEALTH CARE EDUCATION/TRAINING PROGRAM

## 2025-01-12 PROCEDURE — 80069 RENAL FUNCTION PANEL: CPT

## 2025-01-12 RX ORDER — OXYCODONE HYDROCHLORIDE 10 MG/1
10 TABLET ORAL EVERY 6 HOURS PRN
Qty: 28 TABLET | Refills: 0 | Status: SHIPPED | OUTPATIENT
Start: 2025-01-12 | End: 2025-01-20 | Stop reason: SDUPTHER

## 2025-01-12 RX ORDER — CYCLOBENZAPRINE HCL 5 MG
5 TABLET ORAL NIGHTLY PRN
Qty: 90 TABLET | Refills: 0 | Status: SHIPPED | OUTPATIENT
Start: 2025-01-12 | End: 2025-02-17 | Stop reason: SDUPTHER

## 2025-01-12 RX ORDER — DOCUSATE SODIUM 100 MG/1
100 CAPSULE, LIQUID FILLED ORAL 2 TIMES DAILY
Qty: 60 CAPSULE | Refills: 0 | Status: SHIPPED | OUTPATIENT
Start: 2025-01-12 | End: 2025-02-11

## 2025-01-12 RX ORDER — ACETAMINOPHEN 325 MG/1
650 TABLET ORAL EVERY 6 HOURS PRN
Qty: 240 TABLET | Refills: 1 | Status: SHIPPED | OUTPATIENT
Start: 2025-01-12 | End: 2025-03-13

## 2025-01-12 RX ORDER — IPRATROPIUM BROMIDE AND ALBUTEROL SULFATE 2.5; .5 MG/3ML; MG/3ML
3 SOLUTION RESPIRATORY (INHALATION) EVERY 4 HOURS PRN
Qty: 180 ML | Refills: 11 | Status: SHIPPED | OUTPATIENT
Start: 2025-01-12 | End: 2025-02-27 | Stop reason: WASHOUT

## 2025-01-12 RX ADMIN — OXYCODONE 15 MG: 5 TABLET ORAL at 12:06

## 2025-01-12 RX ADMIN — METHOCARBAMOL TABLETS 500 MG: 500 TABLET, COATED ORAL at 07:14

## 2025-01-12 RX ADMIN — PANTOPRAZOLE SODIUM 40 MG: 40 TABLET, DELAYED RELEASE ORAL at 07:14

## 2025-01-12 RX ADMIN — ACETAMINOPHEN 650 MG: 325 TABLET ORAL at 12:07

## 2025-01-12 RX ADMIN — ENOXAPARIN SODIUM 30 MG: 100 INJECTION SUBCUTANEOUS at 08:30

## 2025-01-12 RX ADMIN — OXYCODONE 15 MG: 5 TABLET ORAL at 08:30

## 2025-01-12 RX ADMIN — OXYCODONE 15 MG: 5 TABLET ORAL at 04:44

## 2025-01-12 RX ADMIN — ACETAMINOPHEN 650 MG: 325 TABLET ORAL at 04:44

## 2025-01-12 ASSESSMENT — COGNITIVE AND FUNCTIONAL STATUS - GENERAL
MOBILITY SCORE: 23
DRESSING REGULAR UPPER BODY CLOTHING: A LITTLE
TOILETING: A LITTLE
EATING MEALS: A LITTLE
CLIMB 3 TO 5 STEPS WITH RAILING: A LITTLE
DAILY ACTIVITIY SCORE: 18
PERSONAL GROOMING: A LITTLE
CLIMB 3 TO 5 STEPS WITH RAILING: A LITTLE
DRESSING REGULAR LOWER BODY CLOTHING: A LITTLE
DAILY ACTIVITIY SCORE: 21
DRESSING REGULAR LOWER BODY CLOTHING: A LITTLE
DRESSING REGULAR UPPER BODY CLOTHING: A LITTLE
HELP NEEDED FOR BATHING: A LITTLE
HELP NEEDED FOR BATHING: A LITTLE
MOBILITY SCORE: 23

## 2025-01-12 ASSESSMENT — PAIN SCALES - GENERAL: PAINLEVEL_OUTOF10: 9

## 2025-01-12 ASSESSMENT — PAIN - FUNCTIONAL ASSESSMENT: PAIN_FUNCTIONAL_ASSESSMENT: 0-10

## 2025-01-12 NOTE — CARE PLAN
The patient's goals for the shift include      The clinical goals for the shift include patient will have less pain throughout shift

## 2025-01-12 NOTE — DISCHARGE INSTRUCTIONS
START taking these medications     acetaminophen 325 mg tablet; Commonly known as: Tylenol; Take 2 tablets   (650 mg) by mouth every 6 hours if needed for mild pain (1 - 3).   cyclobenzaprine 5 mg tablet; Commonly known as: Flexeril; Take 1 tablet   (5 mg) by mouth as needed at bedtime for muscle spasms.   docusate sodium 100 mg capsule; Commonly known as: Colace; Take 1   capsule (100 mg) by mouth 2 times a day.   ipratropium-albuteroL 0.5-2.5 mg/3 mL nebulizer solution; Commonly known   as: Duo-Neb; Take 3 mL by nebulization every 4 hours if needed for   wheezing.   oxyCODONE 10 mg immediate release tablet; Commonly known as: Roxicodone;   Take 1 tablet (10 mg) by mouth every 6 hours if needed for severe pain (7   - 10) for up to 7 days.     CHANGE how you take these medications     rivaroxaban 20 mg tablet; Commonly known as: Xarelto; Take 1 tablet (20   mg) by mouth once daily. Do not fill before January 20, 2025.; Start   taking on: January 20, 2025; What changed: These instructions start on   January 20, 2025. If you are unsure what to do until then, ask your doctor   or other care provider.     CONTINUE taking these medications     albuterol 90 mcg/actuation inhaler   aspirin 81 mg EC tablet   atorvastatin 40 mg tablet; Commonly known as: Lipitor; TAKE 1 TABLET BY   MOUTH EVERY NIGHT AT BEDTIME   colchicine 0.6 mg tablet; Take 1 tablet (0.6 mg) by mouth once daily.   Dexilant 60 mg DR capsule; Generic drug: dexlansoprazole   * metoprolol succinate  mg 24 hr tablet; Commonly known as:   Toprol-XL   * metoprolol succinate XL 50 mg 24 hr tablet; Commonly known as:   Toprol-XL; Take 1 tablet (50 mg) by mouth once daily.   morphine CR 30 mg 12 hr tablet; Commonly known as: MS Contin   Mucinex 600 mg 12 hr tablet; Generic drug: guaiFENesin  * This list has 2 medication(s) that are the same as other medications   prescribed for you. Read the directions carefully, and ask your doctor or   other care provider to  review them with you.     STOP taking these medications     azithromycin 250 mg tablet; Commonly known as: Zithromax        Outpatient Follow-Up         Future Appointments   Date Time Provider Department Center   1/24/2025  1:30 PM Raúl Kaur PA-C [id number] None   4/3/2025  8:00 AM Ramiro Perez MD PhD [id number] None   10/8/2025 11:00 AM Elaine Welch, APRN-CNP [id number] [facility]   11/25/2025  2:00 PM Martinez Bourne, APRN-CNP [id number] [facility]         If you have any questions or concerns related to your trauma, surgery, or hospitalization, please do not hesitate to call our outpatient clinic nurse coordinator at [phone]. The nurse will get back to you within 48-72 hours. If you feel it is an emergency please proceed to your nearest Emergency Room.    Patient to remain in c-collar at all times. 2 and 6 week follow ups have been arranged. If patient is still in the hospital 2 weeks after surgery, please let neurosurgery team know. Okay to start aspirin POD7, and xarelto POD14. T

## 2025-01-12 NOTE — PROGRESS NOTES
[facility]  TRAUMA SERVICE - PROGRESS NOTE    Patient Name: Naveen Arauz  MRN: [id number]  Admit Date: 105  : 1945  AGE: 79 y.o.   GENDER: male  ==============================================================================  MECHANISM OF INJURY:   79M on xarelto for afib (last taken last night 5:30 pm) s/p mechanical fall down two steps. +head strike without LOC. Complained of neck pain, taken to [facility]. Pan scan notable for C6 fx with large spinal EDH. Grossly NVI since, moderate neck pain, no new numbness/tingling. Also started on CAP treatment, has had dyspnea for past few days, new oxygen requirement.      LOC (yes/no?): No  Anticoagulant / Anti-platelet Rx? (for what dx?): Xarelto  Referring Facility Name (N/A for scene EMR run): [facility]     INJURIES:   Nondisplaced C6 L lamina fx extending to articular facet  Large spinal epidural hematoma w/ severe cord compression/stenosis      OTHER MEDICAL PROBLEMS:  RSV CAP  HTN  HLD  COPD, not on home oxygen  Afib on xarelto  CAD s/p PCI mid RCA  TIA on ASA  Gout  GERD  BPH  Pulmonary HTN  Esophageal stricture  Lumbago     INCIDENTAL FINDINGS:  Lung nodule  Cholelithiasis  AAA     PROCEDURES:  C4-6 Laminectomy for epidural hematoma evacuation    ==============================================================================  TODAY'S ASSESSMENT AND PLAN OF CARE:    #C6 lamina fracture #Spinal epidural hematoma  - s/p 1.5 leminectomy with hematoma evacuation  - Epidural drain removed by NSGY   - Maintain cervical collar for 6 weeks      #Analgesia  - Scheduled tylenol, Robaxin,  Oxy 15 q4h, 0.6mg Dilaudid breakthrough (Patient on morphine baseline for severe arthritic pain. )     #GI #Esophageal stricture #GERD  - Formal swallow study & MBS , passed OK for diet (regular diet and thickened liquids). Repeat testing w/ SLP in 1-2 wks  - SLP updates: recommending advancing to baseline thin liquids, continue regular at  much lower risk for aspiration related complications.   - Pantoprazole  - Bowel regimen     #CV #HLD #Afib #CAD s/p PCI mid RCA #h/o TIA  - Restart Aspirin POD#7, Restart Xarelto POD#14. Patient had surgery 1/5.  - MAP goal >65. Holding home metoprolol due to hypotension  - Atorvastatin     #COPD (not on home O2) #Pulmonary HTN #RSV PNA  - Weaned to RA  - DuoNeb prn  - Bcx 1/5 NGTDx1  - Continue azithromycin and ceftriaxone for 5 days (end 1/10). Improving exam. end precautions 1/11.   - 1/9 1500mL spirometry     # #Hyponatremia, resolved #BPH  - Cr and BUN WNL     # Gout  - Hold home colchicine      #Heme  - Trend CBC  - Hematology s/o     #Endocrine  - Sliding scale insulin     #PT/OT  - Home care recommended  - FWW ordered     #DVTppx  - SCDs  - Enoxaparin     #Dispo  - Home with home health care. Medically ready.  - Patient to remain in c-collar at all times. 2 and 6 week follow ups have been arranged with NSGY. If patient is still in the hospital 2 weeks after surgery, please let neurosurgery team know.     ==============================================================================  CHIEF COMPLAINT / OVERNIGHT EVENTS:   No overnight events on signout. Patient has no acute concerns. Patient denies significant pain.     MEDICAL HISTORY / ROS:  Admission history and ROS reviewed.     PHYSICAL EXAM:  Heart Rate:  []   Temp:  [36.4 °C (97.6 °F)-36.8 °C (98.2 °F)]   Resp:  [16]   BP: (132-166)/(70-87)   SpO2:  [94 %-96 %]     Physical Exam  Vitals and nursing note reviewed.   Constitutional:       General: He is not in acute distress.     Appearance: Normal appearance. He is normal weight. He is not ill-appearing, toxic-appearing or diaphoretic.   HENT:      Head: Normocephalic and atraumatic.      Right Ear: External ear normal.      Left Ear: External ear normal.      Nose: Nose normal.      Mouth/Throat:      Mouth: Mucous membranes are moist.      Pharynx: Oropharynx is clear.   Eyes:      General:          Right eye: No discharge.         Left eye: No discharge.      Conjunctiva/sclera: Conjunctivae normal.   Neck:      Comments: In cervical collar  Cardiovascular:      Rate and Rhythm: Normal rate and regular rhythm.      Heart sounds: Normal heart sounds.   Pulmonary:      Effort: Pulmonary effort is normal. No respiratory distress.      Breath sounds: Normal breath sounds. No stridor. No wheezing or rhonchi.      Comments: R rhonchi have improved significantly.   Chest:      Chest wall: No tenderness.   Abdominal:      General: Abdomen is flat. There is no distension.      Palpations: Abdomen is soft.      Tenderness: There is no abdominal tenderness. There is no guarding.   Musculoskeletal:         General: No deformity.      Cervical back: No rigidity or tenderness.      Right lower leg: No edema.      Left lower leg: No edema.   Skin:     Capillary Refill: Capillary refill takes less than 2 seconds.      Coloration: Skin is not pale.      Findings: Bruising present. No erythema or rash.   Neurological:      General: No focal deficit present.      Mental Status: He is alert and oriented to person, place, and time. Mental status is at baseline.      Sensory: No sensory deficit.      Motor: No weakness.   Psychiatric:         Mood and Affect: Mood normal.         Behavior: Behavior normal.         Thought Content: Thought content normal.         IMAGING SUMMARY:  no new imaging    LABS:  Results from last 7 days   Lab Units 01/12/25  0936 01/11/25  0806 01/10/25  0804   WBC AUTO x10*3/uL 8.0 10.2 9.5   HEMOGLOBIN g/dL 12.8* 12.5* 11.8*   HEMATOCRIT % 38.1* 37.4* 35.1*   PLATELETS AUTO x10*3/uL 223 198 149*     Results from last 7 days   Lab Units 01/07/25  0029 01/06/25  0205 01/05/25  1759   APTT seconds 29 31 33   INR  1.3* 1.3* 1.4*     Results from last 7 days   Lab Units 01/11/25  0806 01/10/25  0804 01/09/25  0745 01/05/25  1759 01/05/25  1045   SODIUM mmol/L 133* 137 134*   < > 131*   POTASSIUM mmol/L  4.0 4.2 4.3   < > 4.2   CHLORIDE mmol/L 98 100 100   < > 94*   CO2 mmol/L 27 28 25   < > 27   BUN mg/dL 11 10 10   < > 12   CREATININE mg/dL 0.71 0.66 0.55   < > 0.78   CALCIUM mg/dL 8.9 8.7 8.8   < > 8.7   PROTEIN TOTAL g/dL  --   --   --   --  6.8   BILIRUBIN TOTAL mg/dL  --   --   --   --  0.8   ALK PHOS U/L  --   --   --   --  129   ALT U/L  --   --   --   --  28   AST U/L  --   --   --   --  47*   GLUCOSE mg/dL 139* 111* 119*   < > 184*    < > = values in this interval not displayed.     Results from last 7 days   Lab Units 01/05/25  1045   BILIRUBIN TOTAL mg/dL 0.8     Results from last 7 days   Lab Units 01/06/25  0205 01/05/25  1800 01/05/25  1538   POCT PH, ARTERIAL pH 7.41 7.40 7.33*   POCT PCO2, ARTERIAL mm Hg 44* 42 52*   POCT PO2, ARTERIAL mm Hg 81* 65* 174*   POCT HCO3 CALCULATED, ARTERIAL mmol/L 27.9* 26.0 27.4*   POCT BASE EXCESS, ARTERIAL mmol/L 2.8 1.0 0.6       I have reviewed all medications, laboratory results, and imaging pertinent for today's encounter.

## 2025-01-12 NOTE — CARE PLAN
The patient's goals for the shift include pain control      The clinical goals for the shift include patient will have less pain throughout shift      Problem: Fall/Injury  Goal: Not fall by end of shift  Outcome: Progressing  Goal: Be free from injury by end of the shift  Outcome: Progressing  Goal: Verbalize understanding of personal risk factors for fall in the hospital  Outcome: Progressing  Goal: Verbalize understanding of risk factor reduction measures to prevent injury from fall in the home  Outcome: Progressing  Goal: Use assistive devices by end of the shift  Outcome: Progressing  Goal: Pace activities to prevent fatigue by end of the shift  Outcome: Progressing     Problem: Skin  Goal: Decreased wound size/increased tissue granulation at next dressing change  Outcome: Progressing  Goal: Participates in plan/prevention/treatment measures  Outcome: Progressing  Goal: Prevent/manage excess moisture  Outcome: Progressing  Goal: Prevent/minimize sheer/friction injuries  Outcome: Progressing  Goal: Promote/optimize nutrition  Outcome: Progressing  Goal: Promote skin healing  Outcome: Progressing     Problem: Pain  Goal: Takes deep breaths with improved pain control throughout the shift  Outcome: Progressing  Goal: Turns in bed with improved pain control throughout the shift  Outcome: Progressing  Goal: Walks with improved pain control throughout the shift  Outcome: Progressing  Goal: Performs ADL's with improved pain control throughout shift  Outcome: Progressing  Goal: Participates in PT with improved pain control throughout the shift  Outcome: Progressing  Goal: Free from opioid side effects throughout the shift  Outcome: Progressing  Goal: Free from acute confusion related to pain meds throughout the shift  Outcome: Progressing     Problem: Pain - Adult  Goal: Verbalizes/displays adequate comfort level or baseline comfort level  Outcome: Progressing     Problem: Safety - Adult  Goal: Free from fall  injury  Outcome: Progressing     Problem: Discharge Planning  Goal: Discharge to home or other facility with appropriate resources  Outcome: Progressing     Problem: Chronic Conditions and Co-morbidities  Goal: Patient's chronic conditions and co-morbidity symptoms are monitored and maintained or improved  Outcome: Progressing

## 2025-01-12 NOTE — DISCHARGE SUMMARY
Discharge Diagnosis  Epidural hematoma (Multi)    Issues Requiring Follow-Up  INJURIES:   Nondisplaced C6 L lamina fx extending to articular facet  Large spinal epidural hematoma w/ severe cord compression/stenosis      OTHER MEDICAL PROBLEMS:  RSV CAP  HTN  HLD  COPD, not on home oxygen  Afib on xarelto  CAD s/p PCI mid RCA  TIA on ASA  Gout  GERD  BPH  Pulmonary HTN  Esophageal stricture  Lumbago     INCIDENTAL FINDINGS:  Lung nodule  Cholelithiasis  AAA     PROCEDURES:  C4-6 Laminectomy for epidural hematoma evacuation    Test Results Pending At Discharge  Pending Labs       Order Current Status    Magnesium In process    Renal Function Panel In process            Hospital Course  79M on xarelto for afib s/p mechanical fall down two steps when walking down them at 2AM. +head strike without LOC. Complained of neck pain, taken to [facility]. Pan scan notable for C6 fx with large spinal EDH. Grossly NVI since, moderate neck pain, no new numbness/tingling. Also started on CAP treatment, had dyspnea for past few days, new oxygen requirement. He was found to have RSV PNA. He was started empirically on ceftriaxone and azithromycin. Patient underwent C4-C6 laminectomy and epidural hematoma evacuation with neurosurgery spine team. Post-operatively patient was transferred to the ICU for MAP goals >75 and q1h neurochecks. Patient did well maintaining MAP goals. He continued to have stable q1 neurochecks. He advanced diet after formal swallow eval and MBS. Patient stable for transfer to [facility] 1/7. Spinal drain removed by NSGY 1/8. Patient to maintain c-collar at all times, will follow up outpatient in 2 and 6 weeks.  Xarelto held until postop day 14.  Seen by PT/OT who rec'd low intensity.    Pertinent Physical Exam At Time of Discharge    PHYSICAL EXAM:  Heart Rate:  []   Temp:  [36.4 °C (97.6 °F)-36.8 °C (98.2 °F)]   Resp:  [16]   BP: (132-166)/(70-87)   SpO2:  [94 %-96 %]      Physical Exam  Vitals and nursing note  reviewed.   Constitutional:       General: He is not in acute distress.     Appearance: Normal appearance. He is normal weight. He is not ill-appearing, toxic-appearing or diaphoretic.   HENT:      Head: Normocephalic and atraumatic.      Right Ear: External ear normal.      Left Ear: External ear normal.      Nose: Nose normal.      Mouth/Throat:      Mouth: Mucous membranes are moist.      Pharynx: Oropharynx is clear.   Eyes:      General:         Right eye: No discharge.         Left eye: No discharge.      Conjunctiva/sclera: Conjunctivae normal.   Neck:      Comments: In cervical collar  Cardiovascular:      Rate and Rhythm: Normal rate and regular rhythm.      Heart sounds: Normal heart sounds.   Pulmonary:      Effort: Pulmonary effort is normal. No respiratory distress.      Breath sounds: Normal breath sounds. No stridor. No wheezing or rhonchi.      Comments: R rhonchi have improved significantly.   Chest:      Chest wall: No tenderness.   Abdominal:      General: Abdomen is flat. There is no distension.      Palpations: Abdomen is soft.      Tenderness: There is no abdominal tenderness. There is no guarding.   Musculoskeletal:         General: No deformity.      Cervical back: No rigidity or tenderness.      Right lower leg: No edema.      Left lower leg: No edema.   Skin:     Capillary Refill: Capillary refill takes less than 2 seconds.      Coloration: Skin is not pale.      Findings: Bruising present. No erythema or rash.   Neurological:      General: No focal deficit present.      Mental Status: He is alert and oriented to person, place, and time. Mental status is at baseline.      Sensory: No sensory deficit.      Motor: No weakness.   Psychiatric:         Mood and Affect: Mood normal.         Behavior: Behavior normal.         Thought Content: Thought content normal.        Home Medications     Medication List      START taking these medications     acetaminophen 325 mg tablet; Commonly known as:  Tylenol; Take 2 tablets   (650 mg) by mouth every 6 hours if needed for mild pain (1 - 3).   cyclobenzaprine 5 mg tablet; Commonly known as: Flexeril; Take 1 tablet   (5 mg) by mouth as needed at bedtime for muscle spasms.   docusate sodium 100 mg capsule; Commonly known as: Colace; Take 1   capsule (100 mg) by mouth 2 times a day.   ipratropium-albuteroL 0.5-2.5 mg/3 mL nebulizer solution; Commonly known   as: Duo-Neb; Take 3 mL by nebulization every 4 hours if needed for   wheezing.   oxyCODONE 10 mg immediate release tablet; Commonly known as: Roxicodone;   Take 1 tablet (10 mg) by mouth every 6 hours if needed for severe pain (7   - 10) for up to 7 days.     CHANGE how you take these medications     rivaroxaban 20 mg tablet; Commonly known as: Xarelto; Take 1 tablet (20   mg) by mouth once daily. Do not fill before January 20, 2025.; Start   taking on: January 20, 2025; What changed: These instructions start on   January 20, 2025. If you are unsure what to do until then, ask your doctor   or other care provider.     CONTINUE taking these medications     albuterol 90 mcg/actuation inhaler   aspirin 81 mg EC tablet   atorvastatin 40 mg tablet; Commonly known as: Lipitor; TAKE 1 TABLET BY   MOUTH EVERY NIGHT AT BEDTIME   colchicine 0.6 mg tablet; Take 1 tablet (0.6 mg) by mouth once daily.   Dexilant 60 mg DR capsule; Generic drug: dexlansoprazole   * metoprolol succinate  mg 24 hr tablet; Commonly known as:   Toprol-XL   * metoprolol succinate XL 50 mg 24 hr tablet; Commonly known as:   Toprol-XL; Take 1 tablet (50 mg) by mouth once daily.   morphine CR 30 mg 12 hr tablet; Commonly known as: MS Contin   Mucinex 600 mg 12 hr tablet; Generic drug: guaiFENesin  * This list has 2 medication(s) that are the same as other medications   prescribed for you. Read the directions carefully, and ask your doctor or   other care provider to review them with you.     STOP taking these medications     azithromycin 250 mg  tablet; Commonly known as: Zithromax       Outpatient Follow-Up  Future Appointments   Date Time Provider Department Center   1/24/2025  1:30 PM Raúl Kaur PA-C [id number] None   4/3/2025  8:00 AM Ramiro Perez MD PhD [id number] None   10/8/2025 11:00 AM Elaine Welch, APRN-CNP [id number] [facility]   11/25/2025  2:00 PM Martinez Bourne APRN-CNP [id number] [facility]       If you have any questions or concerns related to your trauma, surgery, or hospitalization, please do not hesitate to call our outpatient clinic nurse coordinator at [phone]. The nurse will get back to you within 48-72 hours. If you feel it is an emergency please proceed to your nearest Emergency Room.

## 2025-01-12 NOTE — NURSING NOTE
Patient being discharged home. Order in place. Discharge instructions reviewed with patient and family, and all questions have been answered. IVs removed. Medications and all belongings sent home with the patient.

## 2025-01-12 NOTE — CARE PLAN
Met with pt and introduced myself as care coordinator with Care Transitions Team for discharge planning. Pt is agreeable to discharge to home with [facility]. MD is aware of Pt discharge along with  the Nurse. Pt is leaving Via Car with Wife and Daughter. Pt reported no safety concerns at home.  Pt's address, phone number, and contact information was verified.  Discharge Planning: Pt is discharging to home with  [facility]. SOC 1/13.

## 2025-01-13 LAB
ATRIAL RATE: 74 BPM
P AXIS: 83 DEGREES
P OFFSET: 169 MS
P ONSET: 119 MS
PR INTERVAL: 204 MS
Q ONSET: 221 MS
QRS COUNT: 12 BEATS
QRS DURATION: 80 MS
QT INTERVAL: 392 MS
QTC CALCULATION(BAZETT): 435 MS
QTC FREDERICIA: 420 MS
R AXIS: 67 DEGREES
T AXIS: 50 DEGREES
T OFFSET: 417 MS
VENTRICULAR RATE: 74 BPM

## 2025-01-20 ENCOUNTER — TELEPHONE (OUTPATIENT)
Dept: NEUROSURGERY | Facility: CLINIC | Age: 80
End: 2025-01-20
Payer: MEDICARE

## 2025-01-20 DIAGNOSIS — S06.4XAA EPIDURAL HEMATOMA (MULTI): ICD-10-CM

## 2025-01-20 DIAGNOSIS — S12.501A CLOSED NONDISPLACED FRACTURE OF SIXTH CERVICAL VERTEBRA, UNSPECIFIED FRACTURE MORPHOLOGY, INITIAL ENCOUNTER: ICD-10-CM

## 2025-01-20 DIAGNOSIS — G89.18 POST-OPERATIVE PAIN: ICD-10-CM
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
## 2025-01-20 DIAGNOSIS — S09.90XA CLOSED HEAD INJURY, INITIAL ENCOUNTER: ICD-10-CM

## 2025-01-20 RX ORDER — OXYCODONE HYDROCHLORIDE 10 MG/1
10 TABLET ORAL EVERY 6 HOURS PRN
Qty: 28 TABLET | Refills: 0 | Status: SHIPPED | OUTPATIENT
Start: 2025-01-20 | End: 2025-01-27

## 2025-01-20 NOTE — PROGRESS NOTES
I have personally reviewed the OARRS report for Naveen Arauz. I have considered the risks of abuse, dependence, addiction and  diversion. I believe that it is clinically appropriate for Naveen Arauz  to be prescribed this medication.  Traumatic incident leading to surgical intervention. Refill requested. Will wean as soon as appropriate.     Patient with recent history of major reconstructive surgery necessitating narcotic medications at higher doses during the acute post-operative period.     Will continue with close monitoring and short course refills.     The above clinical summary has been dictated with voice recognition software. It has not been proofread for grammatical errors, typographical mistakes, or other semantic inconsistencies.    Thank you for visiting our office today. It was our pleasure to take part in your healthcare.     Do not hesitate to call with any questions regarding your plan of care after leaving at [phone] M-F 8am-4pm.     To clinicians, thank you very much for this kind referral. It is a privilege to partner with you in the care of your patients. My office would be delighted to assist you with any further consultations or with questions regarding the plan of care outlined. Do not hesitate to call the office or contact me directly.       Sincerely,      LUIS Hicks, PALillianaC  Associate Physician Assistant, Neurosurgery  Clinical   [facility] [employer] [place] [address]    Phone: [phone]  Fax: [phone]

## 2025-01-24 ENCOUNTER — OFFICE VISIT (OUTPATIENT)
Facility: CLINIC | Age: 80
End: 2025-01-24
Payer: MEDICARE

## 2025-01-24 VITALS
DIASTOLIC BLOOD PRESSURE: 74 MMHG | TEMPERATURE: 97.7 F | HEIGHT: 71 IN | BODY MASS INDEX: 24.92 KG/M2 | RESPIRATION RATE: 16 BRPM | WEIGHT: 178 LBS | SYSTOLIC BLOOD PRESSURE: 116 MMHG | HEART RATE: 80 BPM

## 2025-01-24 DIAGNOSIS — S12.591D OTHER CLOSED NONDISPLACED FRACTURE OF SIXTH CERVICAL VERTEBRA WITH ROUTINE HEALING, SUBSEQUENT ENCOUNTER: ICD-10-CM

## 2025-01-24 DIAGNOSIS — Z98.890 H/O EXCISION OF LAMINA OF CERVICAL VERTEBRA FOR DECOMPRESSION OF SPINAL CORD: ICD-10-CM

## 2025-01-24 DIAGNOSIS — S06.4XAA EPIDURAL HEMATOMA (MULTI): Primary | ICD-10-CM

## 2025-01-24 PROCEDURE — 99211 OFF/OP EST MAY X REQ PHY/QHP: CPT | Performed by: PHYSICIAN ASSISTANT

## 2025-01-24 PROCEDURE — 1159F MED LIST DOCD IN RCRD: CPT | Performed by: PHYSICIAN ASSISTANT

## 2025-01-24 PROCEDURE — 1036F TOBACCO NON-USER: CPT | Performed by: PHYSICIAN ASSISTANT

## 2025-01-24 PROCEDURE — 3078F DIAST BP <80 MM HG: CPT | Performed by: PHYSICIAN ASSISTANT

## 2025-01-24 PROCEDURE — 3074F SYST BP LT 130 MM HG: CPT | Performed by: PHYSICIAN ASSISTANT

## 2025-01-24 PROCEDURE — 1157F ADVNC CARE PLAN IN RCRD: CPT | Performed by: PHYSICIAN ASSISTANT

## 2025-01-24 PROCEDURE — 1125F AMNT PAIN NOTED PAIN PRSNT: CPT | Performed by: PHYSICIAN ASSISTANT

## 2025-01-24 PROCEDURE — 1111F DSCHRG MED/CURRENT MED MERGE: CPT | Performed by: PHYSICIAN ASSISTANT

## 2025-01-24 RX ORDER — ACETAMINOPHEN 325 MG/1
650 TABLET ORAL EVERY 6 HOURS PRN
Qty: 240 TABLET | Refills: 0 | Status: SHIPPED | OUTPATIENT
Start: 2025-01-24 | End: 2025-02-23

## 2025-01-24 ASSESSMENT — PAIN SCALES - GENERAL: PAINLEVEL_OUTOF10: 6

## 2025-01-24 ASSESSMENT — PATIENT HEALTH QUESTIONNAIRE - PHQ9
SUM OF ALL RESPONSES TO PHQ9 QUESTIONS 1 AND 2: 0
1. LITTLE INTEREST OR PLEASURE IN DOING THINGS: NOT AT ALL
2. FEELING DOWN, DEPRESSED OR HOPELESS: NOT AT ALL

## 2025-01-24 NOTE — PROGRESS NOTES
[facility] [place]  Department of Neurological Surgery  Post Operative Patient Visit      History of Present Illness:  Naveen Arauz is a 80 y.o. year old male who presents post C4-6 laminectomy for epidural hematoma evacuation by Dr. Ramiro Perez on January 5, 2025.  Patient also with laminar fracture at C6 though not fully included in extent of laminectomy.  Currently he is ambulating with use of wheeled walker though not reliant on this and will probably be approved for a cane usage soon.  He does continue with posterior neck pain and recently picked up refill of oxycodone prior day.  Will also give prescription for Tylenol to utilize in concert.  Inspection of his incision shows well-approximated, nonerythemic, nonedematous surgical incision with good fibrotic tissue spanning.  Cervical collar available if he endorses usage regularly.  Upper extremity weakness continues though he feels as if he is making improvement.  Home physical therapy currently scheduled.  Will also place order for updated CT scan for interval surveillance and possible discontinuation of cervical collar.        14/14 systems reviewed and negative other than what is listed in the history of present illness    Patient Active Problem List   Diagnosis    Paroxysmal atrial fibrillation (Multi)    Coronary artery disease    Disorder of rotator cuff    Impingement syndrome of shoulder region    Disorder of tendon of biceps    Essential hypertension    Gastroesophageal reflux disease    Hemangioma of skin and subcutaneous tissue    History of coronary artery stent placement    Lyme disease    Melanocytic nevi of trunk    Melanocytic nevi of unspecified lower limb, including hip    Melanocytic nevi of unspecified upper limb, including shoulder    Dermatitis, unspecified    Other hypertrophic disorders of the skin    Other melanin hyperpigmentation    Actinic keratosis    Other seborrheic keratosis    Abnormal compliance of  bladder    BPH (benign prostatic hyperplasia)    Urethral stricture    Fall    Closed head injury    Closed nondisplaced fracture of sixth cervical vertebra    RSV (respiratory syncytial virus infection)    Pneumonia of both lower lobes due to infectious organism    Fracture of unspecified parts of lumbosacral spine and pelvis, initial encounter for closed fracture (Multi)    Epidural hematoma (Multi)     Past Medical History:   Diagnosis Date    A-fib (Multi)     Arrhythmia     Coronary artery disease     Gout     Hyperlipidemia     Hypertension     Transient cerebral ischemic attack, unspecified     TIA (transient ischemic attack)     Past Surgical History:   Procedure Laterality Date    CARDIAC CATHETERIZATION N/A 2023    Procedure: Left Heart Cath;  Surgeon: Manuel Blackwell MD;  Location: [facility] Cardiac [facility] Lab;  Service: Cardiovascular;  Laterality: N/A;    MR HEAD ANGIO WO IV CONTRAST  2018    MR HEAD ANGIO WO IV CONTRAST 2018 [facility]    MR NECK ANGIO WO IV CONTRAST  2018    MR NECK ANGIO WO IV CONTRAST 2018 [facility]    OTHER SURGICAL HISTORY  2018    Stomach surgery    OTHER SURGICAL HISTORY  2018    Knee surgery    OTHER SURGICAL HISTORY  2018    Back surgery    OTHER SURGICAL HISTORY  2018    Cardiac catheterization with stent placement     Social History     Tobacco Use    Smoking status: Former     Current packs/day: 0.00     Types: Cigarettes     Quit date:      Years since quittin.0    Smokeless tobacco: Never   Substance Use Topics    Alcohol use: Yes     Comment: Occasional     family history includes Heart attack in his father, mother, and another family member.    Current Outpatient Medications:     acetaminophen (Tylenol) 325 mg tablet, Take 2 tablets (650 mg) by mouth every 6 hours if needed for mild pain (1 - 3)., Disp: 240 tablet, Rfl: 1    aspirin 81 mg EC tablet, Take 1 tablet (81 mg) by mouth once daily.,  Disp: , Rfl:     atorvastatin (Lipitor) 40 mg tablet, TAKE 1 TABLET BY MOUTH EVERY NIGHT AT BEDTIME, Disp: 90 tablet, Rfl: 1    cyclobenzaprine (Flexeril) 5 mg tablet, Take 1 tablet (5 mg) by mouth as needed at bedtime for muscle spasms., Disp: 90 tablet, Rfl: 0    dexlansoprazole (Dexilant) 60 mg DR capsule, Take 1 capsule (60 mg) by mouth once daily., Disp: , Rfl:     docusate sodium (Colace) 100 mg capsule, Take 1 capsule (100 mg) by mouth 2 times a day., Disp: 60 capsule, Rfl: 0    ipratropium-albuteroL (Duo-Neb) 0.5-2.5 mg/3 mL nebulizer solution, Take 3 mL by nebulization every 4 hours if needed for wheezing., Disp: 180 mL, Rfl: 11    metoprolol succinate XL (Toprol-XL) 100 mg 24 hr tablet, Take 0.5 tablets (50 mg) by mouth once daily. Do not crush or chew., Disp: , Rfl:     morphine CR (MS Contin) 30 mg 12 hr tablet, TAKE ONE TABLET BY MOUTH EVERY 8 HOURS FOR PAIN -TAKE 1 TABLET TWICE A DAY SCHEDULED AND MAY TAKE ONE EXTRA DOSE OF MORPHINE SA IN THE AFTERNOON IF NEEDED FOR PAIN -TAKE 1 TABLET TWICE A DAY SCHEDULED AND MAY TAKE ONE EXTRA DOSE OF MORPHINE SA IN THE AFTERNOON IF NEEDED FOR PAIN, Disp: , Rfl:     Mucinex 600 mg 12 hr tablet, Take 1 tablet (600 mg) by mouth 2 times a day as needed for cough or congestion., Disp: , Rfl:     oxyCODONE (Roxicodone) 10 mg immediate release tablet, Take 1 tablet (10 mg) by mouth every 6 hours if needed for severe pain (7 - 10) for up to 7 days., Disp: 28 tablet, Rfl: 0    rivaroxaban (Xarelto) 20 mg tablet, Take 1 tablet (20 mg) by mouth once daily. Do not fill before January 20, 2025., Disp: 50 tablet, Rfl: 0    acetaminophen (TylenoL) 325 mg tablet, Take 2 tablets (650 mg) by mouth every 6 hours if needed for mild pain (1 - 3)., Disp: 240 tablet, Rfl: 0    albuterol 90 mcg/actuation inhaler, Inhale 2 puffs once daily as needed for wheezing or shortness of breath. (Patient not taking: Reported on 1/24/2025), Disp: , Rfl:     colchicine 0.6 mg tablet, Take 1 tablet  (0.6 mg) by mouth once daily. (Patient not taking: Reported on 1/24/2025), Disp: 30 tablet, Rfl: 11    metoprolol succinate XL (Toprol-XL) 50 mg 24 hr tablet, Take 1 tablet (50 mg) by mouth once daily. (Patient not taking: Reported on 1/5/2025), Disp: 90 tablet, Rfl: 3  Allergies   Allergen Reactions    Nsaids (Non-Steroidal Anti-Inflammatory Drug) GI Upset and Nausea/vomiting     Due to previous GI procedures no NSAID or Tylenol         The above clinical summary has been dictated with voice recognition software. It has not been proofread for grammatical errors, typographical mistakes, or other semantic inconsistencies.    Thank you for visiting our office today. It was our pleasure to take part in your healthcare.     Do not hesitate to call with any questions regarding your plan of care after leaving at [phone] M-F 8am-4pm.     To clinicians, thank you very much for this kind referral. It is a privilege to partner with you in the care of your patients. My office would be delighted to assist you with any further consultations or with questions regarding the plan of care outlined. Do not hesitate to call the office or contact me directly.       Sincerely,      LUIS Hicks, PA-C  Associate Physician Assistant, Neurosurgery  Clinical   [facility]    [place] [id number]    Phone: [phone]  Fax: [phone]

## 2025-01-30 ENCOUNTER — TELEPHONE (OUTPATIENT)
Dept: NEUROSURGERY | Facility: CLINIC | Age: 80
End: 2025-01-30
Payer: MEDICARE

## 2025-01-30 DIAGNOSIS — G89.18 POSTOPERATIVE PAIN: ICD-10-CM

## 2025-01-30 DIAGNOSIS — Z98.890 H/O EXCISION OF LAMINA OF CERVICAL VERTEBRA FOR DECOMPRESSION OF SPINAL CORD: Primary | ICD-10-CM

## 2025-01-30 DIAGNOSIS — S12.591D OTHER CLOSED NONDISPLACED FRACTURE OF SIXTH CERVICAL VERTEBRA WITH ROUTINE HEALING, SUBSEQUENT ENCOUNTER: ICD-10-CM

## 2025-01-30 DIAGNOSIS — G89.18 ACUTE POSTOPERATIVE PAIN: Primary | ICD-10-CM

## 2025-01-30 RX ORDER — OXYCODONE HYDROCHLORIDE 5 MG/1
5 TABLET ORAL EVERY 6 HOURS PRN
Qty: 28 TABLET | Refills: 0 | Status: SHIPPED | OUTPATIENT
Start: 2025-01-30 | End: 2025-02-06

## 2025-01-30 NOTE — PROGRESS NOTES
I have personally reviewed the OARRS report for Naveen Arauz. I have considered the risks of abuse, dependence, addiction and  diversion. I believe that it is clinically appropriate for Naveen Arauz  to be prescribed this medication.      Patient with recent history of major reconstructive surgery necessitating narcotic medications at higher doses during the acute post-operative period.     Will continue with close monitoring and short course refills.     The above clinical summary has been dictated with voice recognition software. It has not been proofread for grammatical errors, typographical mistakes, or other semantic inconsistencies.    Thank you for visiting our office today. It was our pleasure to take part in your healthcare.     Do not hesitate to call with any questions regarding your plan of care after leaving at [phone] M-F 8am-4pm.     To clinicians, thank you very much for this kind referral. It is a privilege to partner with you in the care of your patients. My office would be delighted to assist you with any further consultations or with questions regarding the plan of care outlined. Do not hesitate to call the office or contact me directly.       Sincerely,      LUIS Hicks, PA-C  Associate Physician Assistant, Neurosurgery  Clinical   [facility] [place] [address]    Phone: [phone]  Fax: [phone]

## 2025-02-05 ENCOUNTER — TELEPHONE (OUTPATIENT)
Dept: NEUROSURGERY | Facility: CLINIC | Age: 80
End: 2025-02-05
Payer: MEDICARE

## 2025-02-05 DIAGNOSIS — G89.18 ACUTE POSTOPERATIVE PAIN: ICD-10-CM

## 2025-02-05 RX ORDER — OXYCODONE HYDROCHLORIDE 5 MG/1
5 TABLET ORAL EVERY 6 HOURS PRN
Qty: 28 TABLET | Refills: 0 | Status: SHIPPED | OUTPATIENT
Start: 2025-02-05 | End: 2025-02-12

## 2025-02-05 NOTE — PROGRESS NOTES
I have personally reviewed the OARRS report for Naveen Arauz. I have considered the risks of abuse, dependence, addiction and  diversion. I believe that it is clinically appropriate for Naveen Arauz  to be prescribed this medication.      Patient with recent history of major reconstructive surgery necessitating narcotic medications at higher doses during the acute post-operative period.     Will continue with close monitoring and short course refills.     The above clinical summary has been dictated with voice recognition software. It has not been proofread for grammatical errors, typographical mistakes, or other semantic inconsistencies.    Thank you for visiting our office today. It was our pleasure to take part in your healthcare.     Do not hesitate to call with any questions regarding your plan of care after leaving at [phone] M-F 8am-4pm.     To clinicians, thank you very much for this kind referral. It is a privilege to partner with you in the care of your patients. My office would be delighted to assist you with any further consultations or with questions regarding the plan of care outlined. Do not hesitate to call the office or contact me directly.       Sincerely,      LUIS Hicks, PA-C  Associate Physician Assistant, Neurosurgery  Clinical   [facility] [place] [address]    Phone: [phone]  Fax: [phone]

## 2025-02-17 ENCOUNTER — TELEPHONE (OUTPATIENT)
Dept: NEUROSURGERY | Facility: CLINIC | Age: 80
End: 2025-02-17
Payer: MEDICARE

## 2025-02-17 DIAGNOSIS — G89.18 POST-OPERATIVE PAIN: ICD-10-CM

## 2025-02-17 DIAGNOSIS — S12.501A CLOSED NONDISPLACED FRACTURE OF SIXTH CERVICAL VERTEBRA, UNSPECIFIED FRACTURE MORPHOLOGY, INITIAL ENCOUNTER: ICD-10-CM

## 2025-02-17 DIAGNOSIS — S06.4XAA EPIDURAL HEMATOMA (MULTI): ICD-10-CM
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
## 2025-02-17 RX ORDER — CYCLOBENZAPRINE HCL 5 MG
5 TABLET ORAL NIGHTLY PRN
Qty: 90 TABLET | Refills: 0 | Status: SHIPPED | OUTPATIENT
Start: 2025-02-17

## 2025-02-24 ENCOUNTER — HOSPITAL ENCOUNTER (OUTPATIENT)
Dept: RADIOLOGY | Facility: HOSPITAL | Age: 80
Discharge: HOME | End: 2025-02-24
Payer: MEDICARE

## 2025-02-24 DIAGNOSIS — S06.4XAA EPIDURAL HEMATOMA (MULTI): ICD-10-CM

## 2025-02-24 DIAGNOSIS — Z98.890 H/O EXCISION OF LAMINA OF CERVICAL VERTEBRA FOR DECOMPRESSION OF SPINAL CORD: ICD-10-CM

## 2025-02-24 DIAGNOSIS — S12.591D OTHER CLOSED NONDISPLACED FRACTURE OF SIXTH CERVICAL VERTEBRA WITH ROUTINE HEALING, SUBSEQUENT ENCOUNTER: ICD-10-CM

## 2025-02-24 PROCEDURE — 72125 CT NECK SPINE W/O DYE: CPT

## 2025-02-27 ENCOUNTER — OFFICE VISIT (OUTPATIENT)
Facility: CLINIC | Age: 80
End: 2025-02-27
Payer: MEDICARE

## 2025-02-27 VITALS
HEART RATE: 83 BPM | DIASTOLIC BLOOD PRESSURE: 78 MMHG | WEIGHT: 153 LBS | TEMPERATURE: 97.4 F | SYSTOLIC BLOOD PRESSURE: 124 MMHG | HEIGHT: 72 IN | BODY MASS INDEX: 20.72 KG/M2

## 2025-02-27 DIAGNOSIS — S14.129D CENTRAL CORD SYNDROME, SUBSEQUENT ENCOUNTER (MULTI): ICD-10-CM

## 2025-02-27 DIAGNOSIS — Z98.890 H/O EXCISION OF LAMINA OF CERVICAL VERTEBRA FOR DECOMPRESSION OF SPINAL CORD: ICD-10-CM

## 2025-02-27 DIAGNOSIS — M43.12 ACQUIRED SPONDYLOLISTHESIS OF CERVICAL VERTEBRA: Primary | ICD-10-CM

## 2025-02-27 PROCEDURE — 3078F DIAST BP <80 MM HG: CPT | Performed by: NEUROLOGICAL SURGERY

## 2025-02-27 PROCEDURE — 3074F SYST BP LT 130 MM HG: CPT | Performed by: NEUROLOGICAL SURGERY

## 2025-02-27 PROCEDURE — 1125F AMNT PAIN NOTED PAIN PRSNT: CPT | Performed by: NEUROLOGICAL SURGERY

## 2025-02-27 PROCEDURE — 1036F TOBACCO NON-USER: CPT | Performed by: NEUROLOGICAL SURGERY

## 2025-02-27 PROCEDURE — 1159F MED LIST DOCD IN RCRD: CPT | Performed by: NEUROLOGICAL SURGERY

## 2025-02-27 PROCEDURE — 1157F ADVNC CARE PLAN IN RCRD: CPT | Performed by: NEUROLOGICAL SURGERY

## 2025-02-27 PROCEDURE — 99211 OFF/OP EST MAY X REQ PHY/QHP: CPT | Performed by: NEUROLOGICAL SURGERY

## 2025-02-27 ASSESSMENT — PATIENT HEALTH QUESTIONNAIRE - PHQ9
1. LITTLE INTEREST OR PLEASURE IN DOING THINGS: NOT AT ALL
SUM OF ALL RESPONSES TO PHQ9 QUESTIONS 1 & 2: 0
2. FEELING DOWN, DEPRESSED OR HOPELESS: NOT AT ALL

## 2025-02-27 ASSESSMENT — PAIN SCALES - GENERAL: PAINLEVEL_OUTOF10: 6

## 2025-02-28 ENCOUNTER — TELEPHONE (OUTPATIENT)
Dept: NEUROSURGERY | Facility: CLINIC | Age: 80
End: 2025-02-28
Payer: MEDICARE

## 2025-02-28 DIAGNOSIS — Z98.890 H/O EXCISION OF LAMINA OF CERVICAL VERTEBRA FOR DECOMPRESSION OF SPINAL CORD: ICD-10-CM

## 2025-02-28 DIAGNOSIS — M43.12 ACQUIRED SPONDYLOLISTHESIS OF CERVICAL VERTEBRA: Primary | ICD-10-CM

## 2025-02-28 DIAGNOSIS — S14.129D CENTRAL CORD SYNDROME, SUBSEQUENT ENCOUNTER (MULTI): ICD-10-CM

## 2025-02-28 RX ORDER — TIZANIDINE 4 MG/1
4 TABLET ORAL EVERY 6 HOURS PRN
Qty: 60 TABLET | Refills: 0 | Status: SHIPPED | OUTPATIENT
Start: 2025-02-28 | End: 2025-03-30

## 2025-02-28 RX ORDER — TRAMADOL HYDROCHLORIDE 50 MG/1
50 TABLET ORAL EVERY 8 HOURS PRN
Qty: 10 TABLET | Refills: 0 | Status: SHIPPED | OUTPATIENT
Start: 2025-02-28 | End: 2025-03-05

## 2025-02-28 RX ORDER — NALOXONE HYDROCHLORIDE 4 MG/.1ML
1 SPRAY NASAL AS NEEDED
Qty: 2 EACH | Refills: 0 | Status: SHIPPED | OUTPATIENT
Start: 2025-02-28

## 2025-02-28 NOTE — PROGRESS NOTES
[facility] [place]  Department of Neurological Surgery  Post Operative Patient Visit      History of Present Illness:  Naveen Arauz is a 80 y.o. year old male who presents to the spine clinic for a post operative visit.  He has a history of a prior C4-5 ACDF when he was around 32 years old.  I performed an emergent C4-7 posterior decompression on January 5, 2025 after the patient presented to the emergency department following a fall.  His imaging had shown an extensive cervical epidural hematoma, and his symptoms were consistent with central cord syndrome.  He initially did well after surgery, and has had improvement in his pain and paresthesias.  He is currently in physical therapy.  He has also been wearing a c-collar since his surgery.  Unfortunately, he has been experiencing worsening neck pain since his surgery.    14/14 systems reviewed and negative other than what is listed in the history of present illness    Patient Active Problem List   Diagnosis    Paroxysmal atrial fibrillation (Multi)    Coronary artery disease    Disorder of rotator cuff    Impingement syndrome of shoulder region    Disorder of tendon of biceps    Essential hypertension    Gastroesophageal reflux disease    Hemangioma of skin and subcutaneous tissue    History of coronary artery stent placement    Lyme disease    Melanocytic nevi of trunk    Melanocytic nevi of unspecified lower limb, including hip    Melanocytic nevi of unspecified upper limb, including shoulder    Dermatitis, unspecified    Other hypertrophic disorders of the skin    Other melanin hyperpigmentation    Actinic keratosis    Other seborrheic keratosis    Abnormal compliance of bladder    BPH (benign prostatic hyperplasia)    Urethral stricture    Fall    Closed head injury    Closed nondisplaced fracture of sixth cervical vertebra    RSV (respiratory syncytial virus infection)    Pneumonia of both lower lobes due to infectious organism    Fracture of  unspecified parts of lumbosacral spine and pelvis, initial encounter for closed fracture (Multi)    Epidural hematoma (Multi)     Past Medical History:   Diagnosis Date    A-fib (Multi)     Arrhythmia     Coronary artery disease     Gout     Hyperlipidemia     Hypertension     Transient cerebral ischemic attack, unspecified     TIA (transient ischemic attack)     Past Surgical History:   Procedure Laterality Date    CARDIAC CATHETERIZATION N/A 2023    Procedure: Left Heart Cath;  Surgeon: Manuel Blackwell MD;  Location: [facility];  Service: Cardiovascular;  Laterality: N/A;    MR HEAD ANGIO WO IV CONTRAST  2018    MR HEAD ANGIO WO IV CONTRAST 2018 [facility]    MR NECK ANGIO WO IV CONTRAST  2018    MR NECK ANGIO WO IV CONTRAST 2018 [facility]    OTHER SURGICAL HISTORY  2018    Stomach surgery    OTHER SURGICAL HISTORY  2018    Knee surgery    OTHER SURGICAL HISTORY  2018    Back surgery    OTHER SURGICAL HISTORY  2018    Cardiac catheterization with stent placement     Social History     Tobacco Use    Smoking status: Former     Current packs/day: 0.00     Types: Cigarettes     Quit date:      Years since quittin.1    Smokeless tobacco: Never   Substance Use Topics    Alcohol use: Yes     Comment: Occasional     family history includes Heart attack in his father, mother, and another family member.    Current Outpatient Medications:     acetaminophen (Tylenol) 325 mg tablet, Take 2 tablets (650 mg) by mouth every 6 hours if needed for mild pain (1 - 3)., Disp: 240 tablet, Rfl: 1    aspirin 81 mg EC tablet, Take 1 tablet (81 mg) by mouth once daily., Disp: , Rfl:     atorvastatin (Lipitor) 40 mg tablet, TAKE 1 TABLET BY MOUTH EVERY NIGHT AT BEDTIME, Disp: 90 tablet, Rfl: 1    cyclobenzaprine (Flexeril) 5 mg tablet, Take 1 tablet (5 mg) by mouth as needed at bedtime for muscle spasms., Disp: 90 tablet, Rfl: 0    dexlansoprazole  (Dexilant) 60 mg DR capsule, Take 1 capsule (60 mg) by mouth once daily., Disp: , Rfl:     metoprolol succinate XL (Toprol-XL) 100 mg 24 hr tablet, Take 0.5 tablets (50 mg) by mouth once daily. Do not crush or chew., Disp: , Rfl:     morphine CR (MS Contin) 30 mg 12 hr tablet, TAKE ONE TABLET BY MOUTH EVERY 8 HOURS FOR PAIN -TAKE 1 TABLET TWICE A DAY SCHEDULED AND MAY TAKE ONE EXTRA DOSE OF MORPHINE SA IN THE AFTERNOON IF NEEDED FOR PAIN -TAKE 1 TABLET TWICE A DAY SCHEDULED AND MAY TAKE ONE EXTRA DOSE OF MORPHINE SA IN THE AFTERNOON IF NEEDED FOR PAIN, Disp: , Rfl:     rivaroxaban (Xarelto) 20 mg tablet, Take 1 tablet (20 mg) by mouth once daily. Do not fill before January 20, 2025., Disp: 50 tablet, Rfl: 0    metoprolol succinate XL (Toprol-XL) 50 mg 24 hr tablet, Take 1 tablet (50 mg) by mouth once daily. (Patient not taking: Reported on 1/5/2025), Disp: 90 tablet, Rfl: 3  Allergies   Allergen Reactions    Nsaids (Non-Steroidal Anti-Inflammatory Drug) GI Upset and Nausea/vomiting     Due to previous GI procedures no NSAID or Tylenol       Physical Examination:  General: well developed, awake/alert/oriented x3, no distress, alert and cooperative  Head/Neck: neck Supple, no apparent injury  ENMT: mucous membranes moist, no apparent injury, no lesions seen  Cardiovascular: no pitting edema, no JVD  Respiratory/Thorax: Normal breath sounds with good chest expansion, thorax symmetric  Skin: well-healed incision    Neurological/Musculoskeletal:    - Posture: normal coronal & sagittal alignment    - Range of motion: full active & passive range of motion    - Muscle Bulk: normal and symmetric in all extremities    - Paraspinal muscle spasm/tenderness absent    - Motor Strength:  4+/5 bilaterally, finger abduction 2/5 bilaterally, otherwise 5/5 strength throughout bilateral upper extremities    - Sensation: Decreased over the left forearm and hand    - Reflexes: negative Samuel's sign      Results  I personally  reviewed and interpreted the imaging results, which included a CT of the cervical spine performed on July 24, 2025.  This shows interval development of a severe C5-6 anterolisthesis.  This results in a focal kyphosis.  The right C5-6 facet joint is perched.    Assessment/Plan   Naveen Arauz is a 80 y.o. year old male who presents to the spine clinic for a post operative visit.  He has a history of a prior C4-5 ACDF when he was around 32 years old.  I performed an emergent C4-7 posterior decompression on January 5, 2025 after the patient presented to the emergency department following a fall.  His imaging had shown an extensive cervical epidural hematoma, and his symptoms were consistent with central cord syndrome.  He initially did well after surgery, and has had improvement in his pain and paresthesias.  He is currently in physical therapy.  He has also been wearing a c-collar since his surgery.  Unfortunately, he has been experiencing worsening neck pain since his surgery.  His recent CT shows interval development of a severe C5-6 anterolisthesis.  This results in a focal kyphosis.  The right C5-6 facet joint is perched.  I had a lengthy discussion with the patient regarding his condition and treatment options.  I do believe that he is making a good recovery regarding his central cord syndrome.  Unfortunately, he has developed a severe C5-6 anterolisthesis with a perched right C5-6 facet joint.  This is despite him wearing a hard cervical collar.  At this time, I am recommending a C5-6 ACDF.  I will plan to do this with cervical traction. I went over the risks associated with surgery, including infection, bleeding, neurologic injury, spinal fluid leak, and the need for further procedures. All of the patient's questions were answered and they have elected to proceed with surgery.  I have, I am going to order a new cervical MRI to assess for any ligamentous injury or any remaining stenosis.  I am also providing him  with short prescriptions for tramadol and tizanidine to help with his worsening neck pain prior to surgery.        The above clinical summary has been dictated with voice recognition software. It has not been proofread for grammatical errors, typographical mistakes, or other semantic inconsistencies.    Thank you for visiting our office today. It was our pleasure to take part in your healthcare.     Do not hesitate to call with any questions regarding your plan of care after leaving at [phone] M-F 8am-4pm.     To clinicians, thank you very much for this kind referral. It is a privilege to partner with you in the care of your patients. My office would be delighted to assist you with any further consultations or with questions regarding the plan of care outlined. Do not hesitate to call the office or contact me directly.       Sincerely,      Ramiro Perez MD PhD  Attending Neurosurgeon  [facility]   of Neurological Surgery  [facility]    [address]  [place] [address]    [address]  [address]  [place] [address]    Office: [phone]  Fax: [phone]

## 2025-03-01 PROBLEM — Z98.890 H/O EXCISION OF LAMINA OF CERVICAL VERTEBRA FOR DECOMPRESSION OF SPINAL CORD: Status: ACTIVE | Noted: 2025-02-28

## 2025-03-01 PROBLEM — M43.12 ACQUIRED SPONDYLOLISTHESIS OF CERVICAL VERTEBRA: Status: ACTIVE | Noted: 2025-02-28

## 2025-03-01 PROBLEM — S14.129A CENTRAL CORD SYNDROME (MULTI): Status: ACTIVE | Noted: 2025-02-28

## 2025-03-07 ENCOUNTER — HOSPITAL ENCOUNTER (OUTPATIENT)
Dept: RADIOLOGY | Facility: HOSPITAL | Age: 80
Discharge: HOME | End: 2025-03-07
Payer: MEDICARE

## 2025-03-07 DIAGNOSIS — Z98.890 H/O EXCISION OF LAMINA OF CERVICAL VERTEBRA FOR DECOMPRESSION OF SPINAL CORD: ICD-10-CM

## 2025-03-07 DIAGNOSIS — S14.129D CENTRAL CORD SYNDROME, SUBSEQUENT ENCOUNTER (MULTI): ICD-10-CM

## 2025-03-07 DIAGNOSIS — M43.12 ACQUIRED SPONDYLOLISTHESIS OF CERVICAL VERTEBRA: ICD-10-CM

## 2025-03-07 PROCEDURE — 72141 MRI NECK SPINE W/O DYE: CPT

## 2025-03-07 NOTE — H&P (VIEW-ONLY)
"CPM/PAT Evaluation       Name: Naveen Arauz (Naveen Arauz \"Rick\")  /Age: 1945/80 y.o.     In-Person       Chief Complaint: Neck injury     NILDA Martin 81 y/o male presents with acquired spondylolisthesis of cervical vertebra, central cord syndrome, H/O excision of lamina of cervical vertebra for decompression of spinal cord scheduled for C5-6 anterior cervical discectomy fusion, with cervical tracture on 3/21/25. He recently injured his neck after a mechanical fall down 2 steps. He had emergent surgery for a large epidural hematoma in 2024. He has been wearing a neck brace since January. States pain is constant. He has been on MS contin for years due to arthritis. Denies recent fever/illness/chills.     Past Medical History:   Diagnosis Date    A-fib (Multi)     Arrhythmia     Blood clot in spinal cord artery (Multi)     Coronary artery disease     Gout     Hyperlipidemia     Hypertension     Transient cerebral ischemic attack, unspecified     TIA (transient ischemic attack)       Past Surgical History:   Procedure Laterality Date    CARDIAC CATHETERIZATION N/A 2023    Procedure: Left Heart Cath;  Surgeon: Manuel Blackwell MD;  Location: [facility];  Service: Cardiovascular;  Laterality: N/A;    MR HEAD ANGIO WO IV CONTRAST  2018    MR HEAD ANGIO WO IV CONTRAST 2018 [facility]    MR NECK ANGIO WO IV CONTRAST  2018    MR NECK ANGIO WO IV CONTRAST 2018 [facility]    OTHER SURGICAL HISTORY      cervical fusion    OTHER SURGICAL HISTORY      Knee surgery    OTHER SURGICAL HISTORY      Cardiac catheterization with stent placement    OTHER SURGICAL HISTORY      Stomach surgery    OTHER SURGICAL HISTORY      Back surgery       Patient Sexual activity questions deferred to the physician.    Family History   Problem Relation Name Age of Onset    Heart attack Mother      Heart attack Father      Heart attack Other Aunt        Allergies "   Allergen Reactions    Nsaids (Non-Steroidal Anti-Inflammatory Drug) GI Upset and Nausea/vomiting       Prior to Admission medications    Medication Sig Start Date End Date Taking? Authorizing Provider   acetaminophen (Tylenol) 325 mg tablet Take 2 tablets (650 mg) by mouth every 6 hours if needed for mild pain (1 - 3). 1/12/25 3/13/25  Sixto Maravilla DMD, MD   aspirin 81 mg EC tablet Take 1 tablet (81 mg) by mouth once daily. 11/19/18   Historical Provider, MD   atorvastatin (Lipitor) 40 mg tablet TAKE 1 TABLET BY MOUTH EVERY NIGHT AT BEDTIME 11/18/24   Elaine Welch, APRN-CNP   cyclobenzaprine (Flexeril) 5 mg tablet Take 1 tablet (5 mg) by mouth as needed at bedtime for muscle spasms. 2/17/25   Raúl Kaur PA-C   dexlansoprazole (Dexilant) 60 mg DR capsule Take 1 capsule (60 mg) by mouth once daily. 11/19/18   Historical Provider, MD   metoprolol succinate XL (Toprol-XL) 100 mg 24 hr tablet Take 0.5 tablets (50 mg) by mouth once daily. Do not crush or chew.    Historical Provider, MD   metoprolol succinate XL (Toprol-XL) 50 mg 24 hr tablet Take 1 tablet (50 mg) by mouth once daily.  Patient not taking: Reported on 1/5/2025 10/13/23 1/5/25  Elaine Welch APRN-CNP   morphine CR (MS Contin) 30 mg 12 hr tablet TAKE ONE TABLET BY MOUTH EVERY 8 HOURS FOR PAIN -TAKE 1 TABLET TWICE A DAY SCHEDULED AND MAY TAKE ONE EXTRA DOSE OF MORPHINE SA IN THE AFTERNOON IF NEEDED FOR PAIN -TAKE 1 TABLET TWICE A DAY SCHEDULED AND MAY TAKE ONE EXTRA DOSE OF MORPHINE SA IN THE AFTERNOON IF NEEDED FOR PAIN 11/19/18   Historical Provider, MD   naloxone (Narcan) 4 mg/0.1 mL nasal spray Administer 1 spray (4 mg) into affected nostril(s) if needed for opioid reversal. May repeat every 2-3 minutes if needed, alternating nostrils, until medical assistance becomes available. 2/28/25   Ramiro Perez MD PhD   rivaroxaban (Xarelto) 20 mg tablet Take 1 tablet (20 mg) by mouth once daily. Do not fill before January 20, 2025.  1/20/25   Sixto Maravilla DMD, MD   tiZANidine (Zanaflex) 4 mg tablet Take 1 tablet (4 mg) by mouth every 6 hours if needed for muscle spasms. 2/28/25 3/30/25  Ramiro Perez MD PhD   traMADol (Ultram) 50 mg tablet Take 1 tablet (50 mg) by mouth every 8 hours if needed for severe pain (7 - 10) (pain) for up to 5 days. 2/28/25 3/5/25  Ramiro Perez MD PhD        Constitutional: Negative for fever, chills, or sweats   ENMT: Negative for nasal discharge, congestion, ear pain, mouth pain, throat pain. Positive for complete dentures. Positive for intermittent dysphagia.   Respiratory: Negative for cough, wheezing, shortness of breath   Cardiac: Negative for chest pain, dyspnea on exertion, palpitations   Gastrointestinal: Negative for nausea, vomiting, diarrhea, constipation, abdominal pain  Genitourinary: Negative for dysuria, flank pain, frequency, hematuria. Positive for chronic slow urination/nocturia due to BPH.    Musculoskeletal: Negative for decreased ROM, pain, swelling, weakness. See HPI.  Neurological: Negative for dizziness, confusion, headache  Psychiatric: Negative for mood changes   Skin: Negative for itching, rash, ulcer    Hematologic/Lymph: Negative for bruising, easy bleeding  Allergic/Immunologic: Negative itching, sneezing, swelling      Physical Exam  Vitals reviewed.   Constitutional:       Appearance: Normal appearance.   HENT:      Head: Normocephalic.      Mouth/Throat:      Mouth: Mucous membranes are moist.      Pharynx: Oropharynx is clear.   Eyes:      Pupils: Pupils are equal, round, and reactive to light.   Neck:      Comments: Brace intact   Cardiovascular:      Rate and Rhythm: Normal rate and regular rhythm.      Heart sounds: Normal heart sounds.   Pulmonary:      Effort: Pulmonary effort is normal.      Breath sounds: Normal breath sounds.   Abdominal:      General: Bowel sounds are normal.      Palpations: Abdomen is soft.   Musculoskeletal:         General: Normal range  of motion.   Skin:     General: Skin is warm and dry.   Neurological:      General: No focal deficit present.      Mental Status: He is alert and oriented to person, place, and time.   Psychiatric:         Mood and Affect: Mood normal.         Behavior: Behavior normal.          PAT AIRWAY:   Airway:     Mallampati::  II    Neck ROM::  Limited   lower dentures and upper dentures      Testing/Diagnostic:   [facility] from 2023:   CONCLUSIONS:   1. Left main: no significant angiographic disease.   2. LAD: mild-moderate proximal calcification with 10-20% disease.   3. LCx: 30% mid-vessel tubular stenosis.   4. RCA: patent mid-vessel stent, no significant angiographic disease.   5. LVEDP 8mmHg, no aortic stenosis on LV-Ao gradient.    Nuclear stress on file from 2023: IMPRESSION:  1.  Moderate reversible defect along the inferior wall limited by  significant bowel artifact particularly on stress imaging. The  findings raise concern for possible mild ischemia, findings may be  artifactual in nature. Correlation with clinical suspicion is  recommended. If there is a high clinical suspicion, correlation with  an ammonia PET-CT may be of value.  2. The left ventricle is normal in size.  3. Normal LV wall motion with an LV EF estimated at greater than 65%.    Patient Specialist/PCP:   PCP: Dr. Spangler   Cardiology: SHIRLEY Ferrer, CNP    Visit Vitals  /85   Pulse 66   Temp 36.7 °C (98.1 °F) (Temporal)   Resp 18   Ht 1.829 m (6')   Wt 75.1 kg (165 lb 9.6 oz)   SpO2 98%   BMI 22.46 kg/m²   Smoking Status Former   BSA 1.95 m²       DASI Risk Score      Flowsheet Row Pre-Admission Testing from 3/10/2025 in [facility]   Can you take care of yourself (eat, dress, bathe, or use toilet)?  2.75 filed at 03/10/2025 1055   Can you walk indoors, such as around your house? 1.75 filed at 03/10/2025 1055   Can you walk a block or two on level ground?  2.75 filed at 03/10/2025 1055   Can you climb a flight of stairs or  walk up a hill? 5.5 filed at 03/10/2025 1055   Can you run a short distance? 0 filed at 03/10/2025 1055   Can you do light work around the house like dusting or washing dishes? 2.7 filed at 03/10/2025 1055   Can you do moderate work around the house like vacuuming, sweeping floors or carrying groceries? 0 filed at 03/10/2025 1055   Can you do heavy work around the house like scrubbing floors or lifting and moving heavy furniture?  0 filed at 03/10/2025 1055   Can you do yard work like raking leaves, weeding or pushing a mower? 0 filed at 03/10/2025 1055   Can you have sexual relations? 0 filed at 03/10/2025 1055   Can you participate in moderate recreational activities like golf, bowling, dancing, doubles tennis or throwing a baseball or football? 0 filed at 03/10/2025 1055   Can you participate in strenous sports like swimming, singles tennis, football, basketball, or skiing? 0 filed at 03/10/2025 1055   DASI SCORE 15.45 filed at 03/10/2025 1055   METS Score (Will be calculated only when all the questions are answered) 4.6 filed at 03/10/2025 1055          Caprini DVT Assessment      Flowsheet Row Pre-Admission Testing from 3/10/2025 in [facility] [place]   DVT Score (IF A SCORE IS NOT CALCULATING, MUST SELECT A BMI TO COMPLETE) 9 filed at 03/10/2025 1054   Surgical Factors Major surgery planned, lasting over 3 hours filed at 03/10/2025 1054   BMI (BMI MUST BE CHOSEN) 30 or less filed at 03/10/2025 1054          Modified Frailty Index      Flowsheet Row Pre-Admission Testing from 3/10/2025 in [facility]   Non-independent functional status (problems with dressing, bathing, personal grooming, or cooking) 0 filed at 03/10/2025 1056   History of diabetes mellitus  0 filed at 03/10/2025 1056   History of COPD 0 filed at 03/10/2025 1056   History of CHF No filed at 03/10/2025 1056   History of MI 0 filed at 03/10/2025 1056   History of Percutaneous Coronary Intervention, Cardiac Surgery, or Angina  No filed at 03/10/2025 1056   Hypertension requiring the use of medication  0.0909 filed at 03/10/2025 1056   Peripheral vascular disease 0 filed at 03/10/2025 1056   Impaired sensorium (cognitive impairement or loss, clouding, or delirium) 0 filed at 03/10/2025 1056   TIA or CVA withouy residual deficit 0.0909 filed at 03/10/2025 1056   Cerebrovascular accident with deficit 0 filed at 03/10/2025 1056   Modified Frailty Index Calculator .1818 filed at 03/10/2025 1056          [id number] Stroke Risk Points  Current as of just now        6 0 to 9 Points:      Last Change:           The [id number]-YSOn risk score (Moises SPRING, et al. 2009. © 2010 [employer]) quantifies the risk of stroke for a patient with atrial fibrillation. For patients without atrial fibrillation or under the age of 18 this score appears as N/A. Higher score values generally indicate higher risk of stroke.          Points Metrics   0 Has Congestive Heart Failure:  No     Patients with congestive heart failure get 1 point.    Current as of just now   1 Has Hypertension:  Yes     Patients with hypertension get 1 point.    Current as of just now   2 Age:  80     Patients 65 to 74 years old get 1 point, or patients 75 years and older get 2 points.    Current as of just now   0 Has Diabetes:  No     Patients with diabetes get 1 point.    Current as of just now   2 Had Stroke:  No  Had TIA:  Yes   Had Thromboembolism:  Yes     Patients who have had a stroke, TIA, or thromboembolism get 2 points.    Current as of just now   1 Has Vascular Disease:  Yes     Patients with vascular disease get 1 point.    Current as of just now   0 Clinically Relevant Sex:  Male     Patients with a clinically relevant sex of Female get 1 point.    Current as of just now             Revised Cardiac Risk Index      Flowsheet Row Pre-Admission Testing [facility] 3[facility]10/20[facility]   High-Risk Surgery (Intraperitoneal,  Intrathoracic,Suprainguinal vascular) 0 filed at 03/10/2025 1056   History of ischemic heart disease (History of MI, History of positive exercuse test, Current chest paint considered due to myocardial ischemia, Use of nitrate therapy, ECG with pathological Q Waves) 0 filed at 03/10/2025 1056   History of congestive heart failure (pulmonary edemia, bilateral rales or S3 gallop, Paroxysmal nocturnal dyspnea, CXR showing pulmonary vascular redistribution) 0 filed at 03/10/2025 1056   History of cerebrovascular disease (Prior TIA or stroke) 1 filed at 03/10/2025 1056   Pre-operative insulin treatment 0 filed at 03/10/2025 1056   Pre-operative creatinine>2 mg/dl 0 filed at 03/10/2025 1056   Revised Cardiac Risk Calculator 1 filed at 03/10/2025 1056          Apfel Simplified Score      Flowsheet Row Pre-Admission Testing [facility] 3/10/20[facility]   Smoking status 1 filed at 03/10/2025 1056   History of motion sickness or PONV  0 filed at 03/10/2025 1056   Use of postoperative opioids 1 filed at 03/10/2025 1056   Gender - Female 0=No filed at 03/10/2025 1056   Apfel Simplified Score Calculator 2 filed at 03/10/2025 1056          Risk Analysis Index Results This Encounter         3/10/2025  1056             Do you live in a place other than your own home?: 0    When did you begin living in the place you are currently residing?: Greater than one year ago    Any kidney failure, kidney not working well, or seeing a kidney doctor (nephrologist)? If yes, was this for kidney stones or another problem?: 0 No    Any history of chronic (long-term) congestive heart failure (CHF)?: 0 No    Any shortness of breath when resting?: 0 No    In the past five years, have you been diagnosed with or treated for cancer?: No    During the last 3 months has it become difficult for you to remember things or organize your thoughts?: 0 No    Have you lost weight of 10 pounds or more in the past 3 months without trying?: 0 No    Do  you have any loss of appetitie?: 0 No    Getting Around (Mobility): 0 Can get around without help    Eatin Can plan and prepare own meals    Toiletin Can use toilet without any help    Personal Hygiene (Bathing, Hand Washing, Changing Clothes): 0 Can shower or bathe without any help    NEVAREZ Cancer History: Patient does not indicate history of cancer    Total Risk Analysis Index Score Without Cancer: 27    Total Risk Analysis Index Score: 27          Stop Bang Score      Flowsheet Row Pre-Admission Testing from 3/10/2025 in [facility]   Do you snore loudly? 0 filed at 03/10/2025 1055   Do you often feel tired or fatigued after your sleep? 1 filed at 03/10/2025 1055   Has anyone ever observed you stop breathing in your sleep? 0 filed at 03/10/2025 1055   Do you have or are you being treated for high blood pressure? 1 filed at 03/10/2025 1055   Recent BMI (Calculated) 20.8 filed at 03/10/2025 1055   Is BMI greater than 35 kg/m2? 0=No filed at 03/10/2025 1055   Age older than 50 years old? 1=Yes filed at 03/10/2025 1055   Is your neck circumference greater than 17 inches (Male) or 16 inches (Female)? 0 filed at 03/10/2025 1055   Gender - Male 1=Yes filed at 03/10/2025 1055   STOP-BANG Total Score 4 filed at 03/10/2025 1055          Prodigy: High Risk  Total Score: 23              Prodigy Age Score      Prodigy Gender Score     Prodigy Previous Opioid Use Score           ARISCAT Score for Postoperative Pulmonary Complications      Flowsheet Row Pre-Admission Testing from 3/10/2025 in [facility]   Age Calculated Score 3 filed at 03/10/2025 1057   Preoperative SpO2 0 filed at 03/10/2025 1057   Respiratory infection in the last month Either upper or lower (i.e., URI, bronchitis, pneumonia), with fever and antibiotic treatment 0 filed at 03/10/2025 1057   Preoperative anemia (Hgb less than 10 g/dl) 0 filed at 03/10/2025 1057   Surgical incision  24 filed at 03/10/2025 1057   Duration of  surgery  23 filed at 03/10/2025 1057   Emergency Procedure  0 filed at 03/10/2025 1057   ARISCAT Total Score  50 filed at 03/10/2025 1057          Jesus Perioperative Risk for Myocardial Infarction or Cardiac Arrest (SHAHBAZ)      Flowsheet Row Pre-Admission Testing from 3/10/2025 in [facility]   Calculated Age Score 1.56 filed at 03/10/2025 1057   Functional Status  0 filed at 03/10/2025 1057   ASA Class  -3.29 filed at 03/10/2025 1057   Creatinine 0 filed at 03/10/2025 1057   Type of Procedure  0.21 filed at 03/10/2025 1057   SHAHBAZ Total Score  -6.77 filed at 03/10/2025 1057   SHAHBAZ % 0.11 filed at 03/10/2025 1057            Assessment and Plan:     Assessment and Plan:     Preop:   OR with Dr. Perez on 3/21 for C5-6 anterior cervical discectomy fusion, with cervical traction   Labs ordered per Dr. Perez.   EKG on file from 1/7/25.    Cardiac clearance on file.    Neurologic:   TIA: in 2018. On aspirin.  Denies residual deficits.   The patient is at an increased risk for post operative delirium secondary to age >/=65 , decrease functional status, polypharmacy , and type and duration of surgery . Preoperative brain exercise educational handout provided to patient.  The patient is at an increased risk for perioperative stroke secondary to previous CVA/TIA, a-fib, increased age, HTN, HLD, and general anesthesia.    Cardiac:  Hyperlipidemia: On atorvastatin   Hypertension: Controlled with medical management   CAD: S/P PCI mid RCA-2015   Atrial fibrillation: On xarelto. Had ablation in 2019.   Cardiologist: Dr. [id number]   [facility] Activity Status Index (DASI)  DASI Score: 15.45   MET Score: 4.6  CHADS2 score of 4.   RCRI  1 which is 6% 30 day risk of MACE (risk for cardiac death, nonfatal myocardial infarction, and nonfactal cardiac arrest)  SHAHBAZ score which indicates a  0.11 % risk of intraoperative or 30-day postoperative MACE    Pulmonary:   COPD: States he has never had any lung issues  previously  RSV/pneumonia in Jan. 2025: States he feels well currently   STOP-BANG score of  4 . Intermediate  risk of obstructive sleep apnea.   ARISCAT:    50  points which is a high (42.1%) risk of in-hospital post-op pulmonary complications     GI:  GERD: Stable on dexilant   Chronic dysphagia: Has annual esophageal dilation.   States roughly half his stomach was removed in surgery-has intermittent diarrhea/constipation concerns.   Apfel: 2 points 39% risk for post operative N/V    /Renal:   BPH: Slow urination, nocturia     Neuro-muscular:   Gout: Very rare   Osteoarthritis:     General:   Lyme disease: H/O. Has been stable recently    Hematologic:   Anemia: Chronic. Has had a history of iron transfusions.   Caprini score 9, patient at high risk for perioperative DVT. Patient provided with VTE education/handout.     Skin check: Patient was instructed to make surgeon aware of any skin changes/concerns prior to surgery.     Anesthesia: No history of anesthesia complications. No anesthesia concerns.    ASA III-recent anesthesia. No acute changes in medical history since last anesthesia. Tolerated well.     *See risk scores as previously documented

## 2025-03-07 NOTE — CPM/PAT H&P
"CPM/PAT Evaluation       Name: Naveen Arauz (Naveen Arauz \"Rick\")  /Age: 1945/80 y.o.     In-Person       Chief Complaint: Neck injury     NILDA Martin 79 y/o male presents with acquired spondylolisthesis of cervical vertebra, central cord syndrome, H/O excision of lamina of cervical vertebra for decompression of spinal cord scheduled for C5-6 anterior cervical discectomy fusion, with cervical tracture on 3/21/25. He recently injured his neck after a mechanical fall down 2 steps. He had emergent surgery for a large epidural hematoma in 2024. He has been wearing a neck brace since January. States pain is constant. He has been on MS contin for years due to arthritis. Denies recent fever/illness/chills.     Past Medical History:   Diagnosis Date    A-fib (Multi)     Arrhythmia     Blood clot in spinal cord artery (Multi)     Coronary artery disease     Gout     Hyperlipidemia     Hypertension     Transient cerebral ischemic attack, unspecified     TIA (transient ischemic attack)       Past Surgical History:   Procedure Laterality Date    CARDIAC CATHETERIZATION N/A 2023    Procedure: Left Heart Cath;  Surgeon: Manuel Blackwell MD;  Location: [facility];  Service: Cardiovascular;  Laterality: N/A;    MR HEAD ANGIO WO IV CONTRAST  2018    MR HEAD ANGIO WO IV CONTRAST 2018 [facility]    MR NECK ANGIO WO IV CONTRAST  2018    MR NECK ANGIO WO IV CONTRAST 2018 [facility]    OTHER SURGICAL HISTORY      cervical fusion    OTHER SURGICAL HISTORY      Knee surgery    OTHER SURGICAL HISTORY      Cardiac catheterization with stent placement    OTHER SURGICAL HISTORY      Stomach surgery    OTHER SURGICAL HISTORY      Back surgery       Patient Sexual activity questions deferred to the physician.    Family History   Problem Relation Name Age of Onset    Heart attack Mother      Heart attack Father      Heart attack Other Aunt        Allergies "   Allergen Reactions    Nsaids (Non-Steroidal Anti-Inflammatory Drug) GI Upset and Nausea/vomiting       Prior to Admission medications    Medication Sig Start Date End Date Taking? Authorizing Provider   acetaminophen (Tylenol) 325 mg tablet Take 2 tablets (650 mg) by mouth every 6 hours if needed for mild pain (1 - 3). 1/12/25 3/13/25  Sixto Maravilla DMD, MD   aspirin 81 mg EC tablet Take 1 tablet (81 mg) by mouth once daily. 11/19/18   Historical Provider, MD   atorvastatin (Lipitor) 40 mg tablet TAKE 1 TABLET BY MOUTH EVERY NIGHT AT BEDTIME 11/18/24   Elaine Welch, APRN-CNP   cyclobenzaprine (Flexeril) 5 mg tablet Take 1 tablet (5 mg) by mouth as needed at bedtime for muscle spasms. 2/17/25   Raúl Kaur PA-C   dexlansoprazole (Dexilant) 60 mg DR capsule Take 1 capsule (60 mg) by mouth once daily. 11/19/18   Historical Provider, MD   metoprolol succinate XL (Toprol-XL) 100 mg 24 hr tablet Take 0.5 tablets (50 mg) by mouth once daily. Do not crush or chew.    Historical Provider, MD   metoprolol succinate XL (Toprol-XL) 50 mg 24 hr tablet Take 1 tablet (50 mg) by mouth once daily.  Patient not taking: Reported on 1/5/2025 10/13/23 1/5/25  Elaine Welch APRN-CNP   morphine CR (MS Contin) 30 mg 12 hr tablet TAKE ONE TABLET BY MOUTH EVERY 8 HOURS FOR PAIN -TAKE 1 TABLET TWICE A DAY SCHEDULED AND MAY TAKE ONE EXTRA DOSE OF MORPHINE SA IN THE AFTERNOON IF NEEDED FOR PAIN -TAKE 1 TABLET TWICE A DAY SCHEDULED AND MAY TAKE ONE EXTRA DOSE OF MORPHINE SA IN THE AFTERNOON IF NEEDED FOR PAIN 11/19/18   Historical Provider, MD   naloxone (Narcan) 4 mg/0.1 mL nasal spray Administer 1 spray (4 mg) into affected nostril(s) if needed for opioid reversal. May repeat every 2-3 minutes if needed, alternating nostrils, until medical assistance becomes available. 2/28/25   Ramiro Perez MD PhD   rivaroxaban (Xarelto) 20 mg tablet Take 1 tablet (20 mg) by mouth once daily. Do not fill before January 20, 2025.  1/20/25   Sixto Maravilla DMD, MD   tiZANidine (Zanaflex) 4 mg tablet Take 1 tablet (4 mg) by mouth every 6 hours if needed for muscle spasms. 2/28/25 3/30/25  Ramiro Perez MD PhD   traMADol (Ultram) 50 mg tablet Take 1 tablet (50 mg) by mouth every 8 hours if needed for severe pain (7 - 10) (pain) for up to 5 days. 2/28/25 3/5/25  Ramiro Perez MD PhD        Constitutional: Negative for fever, chills, or sweats   ENMT: Negative for nasal discharge, congestion, ear pain, mouth pain, throat pain. Positive for complete dentures. Positive for intermittent dysphagia.   Respiratory: Negative for cough, wheezing, shortness of breath   Cardiac: Negative for chest pain, dyspnea on exertion, palpitations   Gastrointestinal: Negative for nausea, vomiting, diarrhea, constipation, abdominal pain  Genitourinary: Negative for dysuria, flank pain, frequency, hematuria. Positive for chronic slow urination/nocturia due to BPH.    Musculoskeletal: Negative for decreased ROM, pain, swelling, weakness. See HPI.  Neurological: Negative for dizziness, confusion, headache  Psychiatric: Negative for mood changes   Skin: Negative for itching, rash, ulcer    Hematologic/Lymph: Negative for bruising, easy bleeding  Allergic/Immunologic: Negative itching, sneezing, swelling      Physical Exam  Vitals reviewed.   Constitutional:       Appearance: Normal appearance.   HENT:      Head: Normocephalic.      Mouth/Throat:      Mouth: Mucous membranes are moist.      Pharynx: Oropharynx is clear.   Eyes:      Pupils: Pupils are equal, round, and reactive to light.   Neck:      Comments: Brace intact   Cardiovascular:      Rate and Rhythm: Normal rate and regular rhythm.      Heart sounds: Normal heart sounds.   Pulmonary:      Effort: Pulmonary effort is normal.      Breath sounds: Normal breath sounds.   Abdominal:      General: Bowel sounds are normal.      Palpations: Abdomen is soft.   Musculoskeletal:         General: Normal range  of motion.   Skin:     General: Skin is warm and dry.   Neurological:      General: No focal deficit present.      Mental Status: He is alert and oriented to person, place, and time.   Psychiatric:         Mood and Affect: Mood normal.         Behavior: Behavior normal.          PAT AIRWAY:   Airway:     Mallampati::  II    Neck ROM::  Limited   lower dentures and upper dentures      Testing/Diagnostic:   [facility] from 2023:   CONCLUSIONS:   1. Left main: no significant angiographic disease.   2. LAD: mild-moderate proximal calcification with 10-20% disease.   3. LCx: 30% mid-vessel tubular stenosis.   4. RCA: patent mid-vessel stent, no significant angiographic disease.   5. LVEDP 8mmHg, no aortic stenosis on LV-Ao gradient.    Nuclear stress on file from 2023: IMPRESSION:  1.  Moderate reversible defect along the inferior wall limited by  significant bowel artifact particularly on stress imaging. The  findings raise concern for possible mild ischemia, findings may be  artifactual in nature. Correlation with clinical suspicion is  recommended. If there is a high clinical suspicion, correlation with  an ammonia PET-CT may be of value.  2. The left ventricle is normal in size.  3. Normal LV wall motion with an LV EF estimated at greater than 65%.    Patient Specialist/PCP:   PCP: Dr. Spangler   Cardiology: SHIRLEY Ferrer, CNP    Visit Vitals  /85   Pulse 66   Temp 36.7 °C (98.1 °F) (Temporal)   Resp 18   Ht 1.829 m (6')   Wt 75.1 kg (165 lb 9.6 oz)   SpO2 98%   BMI 22.46 kg/m²   Smoking Status Former   BSA 1.95 m²       DASI Risk Score      Flowsheet Row Pre-Admission Testing from 3/10/2025 in [facility] [place]   Can you take care of yourself (eat, dress, bathe, or use toilet)?  2.75 filed at 03/10/2025 1055   Can you walk indoors, such as around your house? 1.75 filed at 03/10/2025 1055   Can you walk a block or two on level ground?  2.75 filed at 03/10/2025 1055   Can you climb a flight of stairs or  walk up a hill? 5.5 filed at 03/10/2025 1055   Can you run a short distance? 0 filed at 03/10/2025 1055   Can you do light work around the house like dusting or washing dishes? 2.7 filed at 03/10/2025 1055   Can you do moderate work around the house like vacuuming, sweeping floors or carrying groceries? 0 filed at 03/10/2025 1055   Can you do heavy work around the house like scrubbing floors or lifting and moving heavy furniture?  0 filed at 03/10/2025 1055   Can you do yard work like raking leaves, weeding or pushing a mower? 0 filed at 03/10/2025 1055   Can you have sexual relations? 0 filed at 03/10/2025 1055   Can you participate in moderate recreational activities like golf, bowling, dancing, doubles tennis or throwing a baseball or football? 0 filed at 03/10/2025 1055   Can you participate in strenous sports like swimming, singles tennis, football, basketball, or skiing? 0 filed at 03/10/2025 1055   DASI SCORE 15.45 filed at 03/10/2025 1055   METS Score (Will be calculated only when all the questions are answered) 4.6 filed at 03/10/2025 1055          Caprini DVT Assessment      Flowsheet Row [place] [facility] [place]   DVT Score (IF A SCORE IS NOT CALCULATING, MUST SELECT A BMI TO COMPLETE) 9 filed at 03/10/2025 1054   Surgical Factors Major surgery planned, lasting over 3 hours filed at 03/10/2025 1054   BMI (BMI MUST BE CHOSEN) 30 or less filed at 03/10/2025 1054          Modified Frailty Index      Flowsheet Row Pre-Admission [place] [facility] [place]   Non-independent functional status (problems with dressing, bathing, personal grooming, or cooking) 0 filed at 03/10/2025 1056   History of diabetes mellitus  0 filed at 03/10/2025 1056   History of COPD 0 filed at 03/10/2025 1056   History of CHF No filed at 03/10/2025 1056   History of MI 0 filed at 03/10/2025 1056   History of Percutaneous Coronary Intervention, Cardiac Surgery, or Angina  No filed at 03/10/2025 1056   Hypertension requiring the use of medication  0.0909 filed at 03/10/2025 1056   Peripheral vascular disease 0 filed at 03/10/2025 1056   Impaired sensorium (cognitive impairement or loss, clouding, or delirium) 0 filed at 03/10/2025 1056   TIA or CVA withouy residual deficit 0.0909 filed at 03/10/2025 1056   Cerebrovascular accident with deficit 0 filed at 03/10/2025 1056   Modified Frailty Index Calculator .1818 filed at 03/10/2025 1056          [id number] Stroke Risk Points  Current as of just now        6 0 to 9 Points:      Last Change:           The [id number] risk score (Moises SPRING, et al. 2009. © 2010 [employer]) quantifies the risk of stroke for a patient with atrial fibrillation. For patients without atrial fibrillation or under the age of 18 this score appears as N/A. Higher score values generally indicate higher risk of stroke.          Points Metrics   0 Has Congestive Heart Failure:  No     Patients with congestive heart failure get 1 point.    Current as of just now   1 Has Hypertension:  Yes     Patients with hypertension get 1 point.    Current as of just now   2 Age:  80     Patients 65 to 74 years old get 1 point, or patients 75 years and older get 2 points.    Current as of just now   0 Has Diabetes:  No     Patients with diabetes get 1 point.    Current as of just now   2 Had Stroke:  No  Had TIA:  Yes   Had Thromboembolism:  Yes     Patients who have had a stroke, TIA, or thromboembolism get 2 points.    Current as of just now   1 Has Vascular Disease:  Yes     Patients with vascular disease get 1 point.    Current as of just now   0 Clinically Relevant Sex:  Male     Patients with a clinically relevant sex of Female get 1 point.    Current as of just now             Revised Cardiac Risk Index      Flowsheet Row Pre-Admission Testing from 3[place]10/2025 [place] [facility] [place]   High-Risk Surgery (Intraperitoneal,  Intrathoracic,Suprainguinal vascular) 0 filed at 03/10/2025 1056   History of ischemic heart disease (History of MI, History of positive exercuse test, Current chest paint considered due to myocardial ischemia, Use of nitrate therapy, ECG with pathological Q Waves) 0 filed at 03/10/2025 1056   History of congestive heart failure (pulmonary edemia, bilateral rales or S3 gallop, Paroxysmal nocturnal dyspnea, CXR showing pulmonary vascular redistribution) 0 filed at 03/10/2025 1056   History of cerebrovascular disease (Prior TIA or stroke) 1 filed at 03/10/2025 1056   Pre-operative insulin treatment 0 filed at 03/10/2025 1056   Pre-operative creatinine>2 mg/dl 0 filed at 03/10/2025 1056   Revised Cardiac Risk Calculator 1 filed at 03/10/2025 1056          Apfel Simplified Score      Flowsheet Row Pre-Admission Testing from 3/10/2025 in [facility] [place]   Smoking status 1 filed at 03/10/2025 1056   History of motion sickness or PONV  0 filed at 03/10/2025 1056   Use of postoperative opioids 1 filed at 03/10/2025 1056   Gender - Female 0=No filed at 03/10/2025 1056   Apfel Simplified Score Calculator 2 filed at 03/10/2025 1056          Risk Analysis Index Results This Encounter         3/10/2025  1056             Do you live in a place other than your own home?: 0    When did you begin living in the place you are currently residing?: Greater than one year ago    Any kidney failure, kidney not working well, or seeing a kidney doctor (nephrologist)? If yes, was this for kidney stones or another problem?: 0 No    Any history of chronic (long-term) congestive heart failure (CHF)?: 0 No    Any shortness of breath when resting?: 0 No    In the past five years, have you been diagnosed with or treated for cancer?: No    During the last 3 months has it become difficult for you to remember things or organize your thoughts?: 0 No    Have you lost weight of 10 pounds or more in the past 3 months without trying?: 0 No    Do  you have any loss of appetitie?: 0 No    Getting Around (Mobility): 0 Can get around without help    Eatin Can plan and prepare own meals    Toiletin Can use toilet without any help    Personal Hygiene (Bathing, Hand Washing, Changing Clothes): 0 Can shower or bathe without any help    NEVAREZ Cancer History: Patient does not indicate history of cancer    Total Risk Analysis Index Score Without Cancer: 27    Total Risk Analysis Index Score: 27          Stop Bang Score      Flowsheet Row Pre-Admission Testing from 3/10/2025 in [facility] [place]   Do you snore loudly? 0 filed at 03/10/2025 1055   Do you often feel tired or fatigued after your sleep? 1 filed at 03/10/2025 1055   Has anyone ever observed you stop breathing in your sleep? 0 filed at 03/10/2025 1055   Do you have or are you being treated for high blood pressure? 1 filed at 03/10/2025 1055   Recent BMI (Calculated) 20.8 filed at 03/10/2025 1055   Is BMI greater than 35 kg/m2? 0=No filed at 03/10/2025 1055   Age older than 50 years old? 1=Yes filed at 03/10/2025 1055   Is your neck circumference greater than 17 inches (Male) or 16 inches (Female)? 0 filed at 03/10/2025 1055   Gender - Male 1=Yes filed at 03/10/2025 1055   STOP-BANG Total Score 4 filed at 03/10/2025 1055          Prodigy: High Risk  Total Score: 23              Prodigy Age Score      Prodigy Gender Score     Prodigy Previous Opioid Use Score           ARISCAT Score for Postoperative Pulmonary Complications      Flowsheet Row Pre-Admission Testing from 3/10/2025 in [facility] [place]   Age Calculated Score 3 filed at 03/10/2025 1057   Preoperative SpO2 0 filed at 03/10/2025 1057   Respiratory infection in the last month Either upper or lower (i.e., URI, bronchitis, pneumonia), with fever and antibiotic treatment 0 filed at 03/10/2025 1057   Preoperative anemia (Hgb less than 10 g/dl) 0 filed at 03/10/2025 1057   Surgical incision  24 filed at 03/10/2025 1057   Duration of  surgery  23 filed at 03/10/2025 1057   Emergency Procedure  0 filed at 03/10/2025 1057   ARISCAT Total Score  50 filed at 03/10/2025 1057          Jesus Perioperative Risk for Myocardial Infarction or Cardiac Arrest (SHAHBAZ)      Flowsheet Row Pre-Admission Testing from 3/10/2025 in [facility] [place]   Calculated Age Score 1.56 filed at 03/10/2025 1057   Functional Status  0 filed at 03/10/2025 1057   ASA Class  -3.29 filed at 03/10/2025 1057   Creatinine 0 filed at 03/10/2025 1057   Type of Procedure  0.21 filed at 03/10/2025 1057   SHAHBAZ Total Score  -6.77 filed at 03/10/2025 1057   SHAHBAZ % 0.11 filed at 03/10/2025 1057            Assessment and Plan:     Assessment and Plan:     Preop:   OR with Dr. Perez on 3/21 for C5-6 anterior cervical discectomy fusion, with cervical traction   Labs ordered per Dr. Perez.   EKG on file from 1/7/25.    Cardiac clearance on file.    Neurologic:   TIA: in 2018. On aspirin.  Denies residual deficits.   The patient is at an increased risk for post operative delirium secondary to age >/=65 , decrease functional status, polypharmacy , and type and duration of surgery . Preoperative brain exercise educational handout provided to patient.  The patient is at an increased risk for perioperative stroke secondary to previous CVA/TIA, a-fib, increased age, HTN, HLD, and general anesthesia.    Cardiac:  Hyperlipidemia: On atorvastatin   Hypertension: Controlled with medical management   CAD: S/P PCI mid RCA-2015   Atrial fibrillation: On xarelto. Had ablation in 2019.   Cardiologist: Dr. [id number]   D[facility] Activity Status Index (DASI)  DASI Score: 15.45   MET Score: 4.6  CHADS2 score of 4.   RCRI  1 which is 6% 30 day risk of MACE (risk for cardiac death, nonfatal myocardial infarction, and nonfactal cardiac arrest)  SHAHABZ score which indicates a  0.11 % risk of intraoperative or 30-day postoperative MACE    Pulmonary:   COPD: States he has never had any lung issues  previously  RSV/pneumonia in Jan. 2025: States he feels well currently   STOP-BANG score of  4 . Intermediate  risk of obstructive sleep apnea.   ARISCAT:    50  points which is a high (42.1%) risk of in-hospital post-op pulmonary complications     GI:  GERD: Stable on dexilant   Chronic dysphagia: Has annual esophageal dilation.   States roughly half his stomach was removed in surgery-has intermittent diarrhea/constipation concerns.   Apfel: 2 points 39% risk for post operative N/V    /Renal:   BPH: Slow urination, nocturia     Neuro-muscular:   Gout: Very rare   Osteoarthritis:     General:   Lyme disease: H/O. Has been stable recently    Hematologic:   Anemia: Chronic. Has had a history of iron transfusions.   Caprini score 9, patient at high risk for perioperative DVT. Patient provided with VTE education/handout.     Skin check: Patient was instructed to make surgeon aware of any skin changes/concerns prior to surgery.     Anesthesia: No history of anesthesia complications. No anesthesia concerns.    ASA III-recent anesthesia. No acute changes in medical history since last anesthesia. Tolerated well.     *See risk scores as previously documented

## 2025-03-10 ENCOUNTER — APPOINTMENT (OUTPATIENT)
Dept: RADIOLOGY | Facility: HOSPITAL | Age: 80
End: 2025-03-10
Payer: MEDICARE

## 2025-03-10 ENCOUNTER — LAB (OUTPATIENT)
Dept: LAB | Facility: HOSPITAL | Age: 80
End: 2025-03-10
Payer: MEDICARE

## 2025-03-10 ENCOUNTER — HOSPITAL ENCOUNTER (OUTPATIENT)
Dept: RADIOLOGY | Facility: HOSPITAL | Age: 80
Discharge: HOME | End: 2025-03-10
Payer: MEDICARE

## 2025-03-10 ENCOUNTER — PRE-ADMISSION TESTING (OUTPATIENT)
Dept: PREADMISSION TESTING | Facility: HOSPITAL | Age: 80
End: 2025-03-10
Payer: MEDICARE

## 2025-03-10 VITALS
BODY MASS INDEX: 22.43 KG/M2 | DIASTOLIC BLOOD PRESSURE: 85 MMHG | RESPIRATION RATE: 18 BRPM | TEMPERATURE: 98.1 F | OXYGEN SATURATION: 98 % | HEART RATE: 66 BPM | SYSTOLIC BLOOD PRESSURE: 169 MMHG | WEIGHT: 165.6 LBS | HEIGHT: 72 IN

## 2025-03-10 DIAGNOSIS — M43.12 ACQUIRED SPONDYLOLISTHESIS OF CERVICAL VERTEBRA: ICD-10-CM

## 2025-03-10 DIAGNOSIS — R79.89 OTHER SPECIFIED ABNORMAL FINDINGS OF BLOOD CHEMISTRY: ICD-10-CM

## 2025-03-10 DIAGNOSIS — S14.129D CENTRAL CORD SYNDROME, SUBSEQUENT ENCOUNTER (MULTI): ICD-10-CM

## 2025-03-10 DIAGNOSIS — Z98.890 H/O EXCISION OF LAMINA OF CERVICAL VERTEBRA FOR DECOMPRESSION OF SPINAL CORD: ICD-10-CM

## 2025-03-10 DIAGNOSIS — M43.12 SPONDYLOLISTHESIS, CERVICAL REGION: Primary | ICD-10-CM

## 2025-03-10 DIAGNOSIS — Z01.818 ENCOUNTER FOR OTHER PREPROCEDURAL EXAMINATION: ICD-10-CM

## 2025-03-10 DIAGNOSIS — Z98.890 OTHER SPECIFIED POSTPROCEDURAL STATES: ICD-10-CM

## 2025-03-10 DIAGNOSIS — S14.129D CENTRAL CORD SYNDROME AT UNSPECIFIED LEVEL OF CERVICAL SPINAL CORD, SUBSEQUENT ENCOUNTER: ICD-10-CM

## 2025-03-10 DIAGNOSIS — M43.12 ACQUIRED SPONDYLOLISTHESIS OF CERVICAL VERTEBRA: Primary | ICD-10-CM

## 2025-03-10 DIAGNOSIS — Z01.818 PREOP EXAMINATION: ICD-10-CM

## 2025-03-10 LAB
ABO GROUP (TYPE) IN BLOOD: NORMAL
ANION GAP SERPL CALC-SCNC: 13 MMOL/L (ref 10–20)
ANTIBODY SCREEN: NORMAL
BASOPHILS # BLD AUTO: 0.07 X10*3/UL (ref 0–0.1)
BASOPHILS NFR BLD AUTO: 0.9 %
BUN SERPL-MCNC: 11 MG/DL (ref 6–23)
CALCIUM SERPL-MCNC: 8.7 MG/DL (ref 8.6–10.3)
CHLORIDE SERPL-SCNC: 105 MMOL/L (ref 98–107)
CO2 SERPL-SCNC: 29 MMOL/L (ref 21–32)
CREAT SERPL-MCNC: 0.72 MG/DL (ref 0.5–1.3)
EGFRCR SERPLBLD CKD-EPI 2021: >90 ML/MIN/1.73M*2
EOSINOPHIL # BLD AUTO: 0.27 X10*3/UL (ref 0–0.4)
EOSINOPHIL NFR BLD AUTO: 3.5 %
ERYTHROCYTE [DISTWIDTH] IN BLOOD BY AUTOMATED COUNT: 13.2 % (ref 11.5–14.5)
GLUCOSE SERPL-MCNC: 92 MG/DL (ref 74–99)
HCT VFR BLD AUTO: 42.2 % (ref 41–52)
HGB BLD-MCNC: 13.4 G/DL (ref 13.5–17.5)
IMM GRANULOCYTES # BLD AUTO: 0.02 X10*3/UL (ref 0–0.5)
IMM GRANULOCYTES NFR BLD AUTO: 0.3 % (ref 0–0.9)
LYMPHOCYTES # BLD AUTO: 1.55 X10*3/UL (ref 0.8–3)
LYMPHOCYTES NFR BLD AUTO: 20.3 %
MCH RBC QN AUTO: 33 PG (ref 26–34)
MCHC RBC AUTO-ENTMCNC: 31.8 G/DL (ref 32–36)
MCV RBC AUTO: 104 FL (ref 80–100)
MONOCYTES # BLD AUTO: 0.63 X10*3/UL (ref 0.05–0.8)
MONOCYTES NFR BLD AUTO: 8.2 %
NEUTROPHILS # BLD AUTO: 5.11 X10*3/UL (ref 1.6–5.5)
NEUTROPHILS NFR BLD AUTO: 66.8 %
NRBC BLD-RTO: 0 /100 WBCS (ref 0–0)
PLATELET # BLD AUTO: 179 X10*3/UL (ref 150–450)
POTASSIUM SERPL-SCNC: 4.6 MMOL/L (ref 3.5–5.3)
RBC # BLD AUTO: 4.06 X10*6/UL (ref 4.5–5.9)
RH FACTOR (ANTIGEN D): NORMAL
SODIUM SERPL-SCNC: 142 MMOL/L (ref 136–145)
WBC # BLD AUTO: 7.7 X10*3/UL (ref 4.4–11.3)

## 2025-03-10 PROCEDURE — 85025 COMPLETE CBC W/AUTO DIFF WBC: CPT

## 2025-03-10 PROCEDURE — 83036 HEMOGLOBIN GLYCOSYLATED A1C: CPT

## 2025-03-10 PROCEDURE — 86850 RBC ANTIBODY SCREEN: CPT

## 2025-03-10 PROCEDURE — 71046 X-RAY EXAM CHEST 2 VIEWS: CPT

## 2025-03-10 PROCEDURE — 86901 BLOOD TYPING SEROLOGIC RH(D): CPT

## 2025-03-10 PROCEDURE — 80048 BASIC METABOLIC PNL TOTAL CA: CPT

## 2025-03-10 PROCEDURE — 87081 CULTURE SCREEN ONLY: CPT | Mod: STJLAB | Performed by: NEUROLOGICAL SURGERY

## 2025-03-10 PROCEDURE — 84134 ASSAY OF PREALBUMIN: CPT

## 2025-03-10 PROCEDURE — 99202 OFFICE O/P NEW SF 15 MIN: CPT

## 2025-03-10 PROCEDURE — 86900 BLOOD TYPING SEROLOGIC ABO: CPT

## 2025-03-10 RX ORDER — CHLORHEXIDINE GLUCONATE ORAL RINSE 1.2 MG/ML
SOLUTION DENTAL
Qty: 473 ML | Refills: 0 | Status: SHIPPED | OUTPATIENT
Start: 2025-03-10

## 2025-03-10 RX ORDER — ALBUTEROL SULFATE 90 UG/1
2 INHALANT RESPIRATORY (INHALATION) 4 TIMES DAILY PRN
COMMUNITY

## 2025-03-10 ASSESSMENT — DUKE ACTIVITY SCORE INDEX (DASI)
CAN YOU TAKE CARE OF YOURSELF (EAT, DRESS, BATHE, OR USE TOILET): YES
CAN YOU PARTICIPATE IN STRENOUS SPORTS LIKE SWIMMING, SINGLES TENNIS, FOOTBALL, BASKETBALL, OR SKIING: NO
CAN YOU CLIMB A FLIGHT OF STAIRS OR WALK UP A HILL: YES
CAN YOU WALK INDOORS, SUCH AS AROUND YOUR HOUSE: YES
DASI METS SCORE: 4.6
TOTAL_SCORE: 15.45
CAN YOU DO MODERATE WORK AROUND THE HOUSE LIKE VACUUMING, SWEEPING FLOORS OR CARRYING GROCERIES: NO
CAN YOU RUN A SHORT DISTANCE: NO
CAN YOU DO HEAVY WORK AROUND THE HOUSE LIKE SCRUBBING FLOORS OR LIFTING AND MOVING HEAVY FURNITURE: NO
CAN YOU DO YARD WORK LIKE RAKING LEAVES, WEEDING OR PUSHING A MOWER: NO
CAN YOU WALK A BLOCK OR TWO ON LEVEL GROUND: YES
CAN YOU PARTICIPATE IN MODERATE RECREATIONAL ACTIVITIES LIKE GOLF, BOWLING, DANCING, DOUBLES TENNIS OR THROWING A BASEBALL OR FOOTBALL: NO
CAN YOU DO LIGHT WORK AROUND THE HOUSE LIKE DUSTING OR WASHING DISHES: YES
CAN YOU HAVE SEXUAL RELATIONS: NO

## 2025-03-10 ASSESSMENT — PAIN - FUNCTIONAL ASSESSMENT: PAIN_FUNCTIONAL_ASSESSMENT: 0-10

## 2025-03-10 ASSESSMENT — ACTIVITIES OF DAILY LIVING (ADL): ADL_SCORE: 0

## 2025-03-10 ASSESSMENT — LIFESTYLE VARIABLES: SMOKING_STATUS: NONSMOKER

## 2025-03-10 ASSESSMENT — PAIN SCALES - GENERAL: PAINLEVEL_OUTOF10: 0 - NO PAIN

## 2025-03-10 NOTE — PREPROCEDURE INSTRUCTIONS
Thank you for visiting Preadmission Testing at [facility]. If you have any changes to your health condition, please call the SURGEON's office to alert them and give them details of your symptoms.  STOP all NSAIDS (Ibuprofen, Motrin, Aleve), vitamins, herbals, supplements, and all over the counter medications for 7 days prior to surgery  Tylenol is okay to take up until the morning of surgery for pain or headache if needed         Preoperative Brain Exercises    What are brain exercises?  A brain exercise is any activity that engages your thinking (cognitive) skills.    What types of activities are considered brain exercises?  Jigsaw puzzles, crossword puzzles, word jumble, memory games, word search, and many more.  Many can be found free online or on your phone via a mobile jeanie.    Why should I do brain exercises before my surgery?  More recent research has shown brain exercise before surgery can lower the risk of postoperative delirium (confusion) which can be especially important for older adults.  Patients who did brain exercises for 5 to 10 hours the days before surgery, cut their risk of postoperative delirium in half up to 1 week after surgery.      Preoperative Deep Breathing Exercises    Why it is important to do deep breathing exercises before my surgery?  Deep breathing exercises strengthen your breathing muscles.  This helps you to recover after your surgery and decreases the chance of breathing complications.    How are the deep breathing exercises done?  Sit straight with your back supported.  Breathe in deeply and slowly through your nose. Your lower rib cage should expand and your abdomen may move forward.  Hold that breath for 3 to 5 seconds.  Breathe out through pursed lips, slowly and completely.  Rest and repeat 10 times every hour while awake.  Rest longer if you become dizzy or lightheaded.      Patient and Family Education   Ways You Can Help Prevent Blood Clots     This handout explains some simple  things you can do to help prevent blood clots.      Blood clots are blockages that can form in the body's veins. When a blood clot forms in your deep veins, it may be called a deep vein thrombosis, or DVT for short. Blood clots can happen in any part of the body where blood flows, but they are most common in the arms and legs. If a piece of a blood clot breaks free and travels to the lungs, it is called a pulmonary embolus (PE). A PE can be a very serious problem.      Being in the hospital or having surgery can raise your chances of getting a blood clot because you may not be well enough to move around as much as you normally do.      Ways you can help prevent blood clots in the hospital         Wearing SCDs. SCDs stands for Sequential Compression Devices.   SCDs are special sleeves that wrap around your legs  They attach to a pump that fills them with air to gently squeeze your legs every few minutes.   This helps return the blood in your legs to your heart.   SCDs should only be taken off when walking or bathing.   SCDs may not be comfortable, but they can help save your life.               Wearing compression stockings - if your doctor orders them. These special snug fitting stockings gently squeeze your legs to help blood flow.       Walking. Walking helps move the blood in your legs.   If your doctor says it is ok, try walking the halls at least   5 times a day. Ask us to help you get up, so you don't fall.      Taking any blood thinning medicines your doctor orders.          ©[facility]; 3/23        Ways you can help prevent blood clots at home       Wearing compression stockings - if your doctor orders them. ? Walking - to help move the blood in your legs.       Taking any blood thinning medicines your doctor orders.      Signs of a blood clot or PE      Tell your doctor or nurse know right away if you have of the problems listed below.    If you are at home, seek medical care right away. Call [phone]  for chest pain or problems breathing.          Signs of a blood clot (DVT) - such as pain,  swelling, redness or warmth in your arm or leg      Signs of a pulmonary embolism (PE) - such as chest     pain or feeling short of breath

## 2025-03-10 NOTE — PREPROCEDURE INSTRUCTIONS
Medication List            Accurate as of March 10, 2025 11:40 AM. Always use your most recent med list.                acetaminophen 325 mg tablet  Commonly known as: Tylenol  Take 2 tablets (650 mg) by mouth every 6 hours if needed for mild pain (1 - 3).  Medication Adjustments for Surgery: Take/Use as prescribed     aspirin 81 mg EC tablet  Additional Medication Adjustments for Surgery: Other (Comment)  Notes to patient: Coordinate with prescribing provider for further instructions on this medications prior to surgery.     atorvastatin 40 mg tablet  Commonly known as: Lipitor  TAKE 1 TABLET BY MOUTH EVERY NIGHT AT BEDTIME  Medication Adjustments for Surgery: Take/Use as prescribed     chlorhexidine 0.12 % solution  Commonly known as: Peridex  15 milliliter(s) orally once a day for 2 doses 15 ml  the night before surgery and 15 ml morning of surgery - swish for 30 seconds -DO NOT SWALLOW, SPIT OUT     cyclobenzaprine 5 mg tablet  Commonly known as: Flexeril  Take 1 tablet (5 mg) by mouth as needed at bedtime for muscle spasms.  Medication Adjustments for Surgery: Take/Use as prescribed     Dexilant 60 mg DR capsule  Generic drug: dexlansoprazole  Medication Adjustments for Surgery: Take/Use as prescribed     * metoprolol succinate  mg 24 hr tablet  Commonly known as: Toprol-XL  Medication Adjustments for Surgery: Take/Use as prescribed     * metoprolol succinate XL 50 mg 24 hr tablet  Commonly known as: Toprol-XL  Take 1 tablet (50 mg) by mouth once daily.  Medication Adjustments for Surgery: Take/Use as prescribed     morphine CR 30 mg 12 hr tablet  Commonly known as: MS Contin  Medication Adjustments for Surgery: Take/Use as prescribed     naloxone 4 mg/0.1 mL nasal spray  Commonly known as: Narcan  Administer 1 spray (4 mg) into affected nostril(s) if needed for opioid reversal. May repeat every 2-3 minutes if needed, alternating nostrils, until medical assistance becomes available.  Medication  Adjustments for Surgery: Take/Use as prescribed     rivaroxaban 20 mg tablet  Commonly known as: Xarelto  Take 1 tablet (20 mg) by mouth once daily. Do not fill before January 20, 2025.  Additional Medication Adjustments for Surgery: Other (Comment)  Notes to patient: Coordinate with prescribing provider for further instructions on this medications prior to surgery.     tiZANidine 4 mg tablet  Commonly known as: Zanaflex  Take 1 tablet (4 mg) by mouth every 6 hours if needed for muscle spasms.  Medication Adjustments for Surgery: Take/Use as prescribed     traMADol 50 mg tablet  Commonly known as: Ultram  Take 1 tablet (50 mg) by mouth every 8 hours if needed for severe pain (7 - 10) (pain) for up to 5 days.  Medication Adjustments for Surgery: Take/Use as prescribed     Ventolin HFA 90 mcg/actuation inhaler  Generic drug: albuterol  Medication Adjustments for Surgery: Take/Use as prescribed           * This list has 2 medication(s) that are the same as other medications prescribed for you. Read the directions carefully, and ask your doctor or other care provider to review them with you.                                      PRE-OPERATIVE INSTRUCTIONS    You will receive notification one business day prior to your procedure to confirm your arrival time. It is important that you answer your phone and/or check your messages during this time. If you do not hear from the surgery center by 5 pm. the day before your procedure, please call [phone].     Please enter the building through the Outpatient entrance and take the elevator off the lobby to the 2nd floor then check in at the Outpatient Surgery desk on the 2nd floor.    INSTRUCTIONS:  Talk to your surgeon for instructions if you should stop your aspirin, blood thinner, or diabetes medicines.  DO NOT take any multivitamins or over the counter supplements for 7-10 days before surgery.  If not being admitted, you must have an adult immediately available to drive you  home after surgery. We also highly recommend you have someone stay with you for the entire day and night of your surgery.  For children having surgery, a parent or legal guardian must accompany them to the surgery center. If this is not possible, please call [phone] to make additional arrangements.  For adults who are unable to consent or make medical decisions for themselves, a legal guardian or Power of  must accompany them to the surgery center. If this is not possible, please call [phone] to make additional arrangements.  Wear comfortable, loose fitting clothing.  All jewelry and piercings must be removed. If you are unable to remove an item or have a dermal piercing, please be sure to tell the nurse when you arrive for surgery.  Nail polish and make-up must be removed.  Avoid smoking or consuming alcohol for 24 hours before surgery.  To help prevent infection, please take a shower/bath and wash your hair the night before and/or morning of surgery (or follow other specific bathing instructions provided).    Preoperative Fasting Guidelines    Why must I stop eating and drinking near surgery time?  With sedation, food or liquid in your stomach can enter your lungs causing serious complications  Increases nausea and vomiting    When do I need to stop eating and drinking before my surgery?  Do not eat any solid food after midnight the night before your surgery/procedure unless otherwise instructed by your surgeon.   You may have up to 13.5 ounces of clear liquid until TWO hours before your instructed arrival time to the hospital.  This includes water, black tea/coffee, (no milk or cream) apple juice, and electrolyte drinks (Gatorade).   You may chew gum until TWO hours before your surgery/procedure      If applicable, notify your surgeons office immediately of any new skin changes that occur to the surgical limb.      If you have any questions or concerns, please call Pre-Admission Testing at [phone]  [phone].

## 2025-03-11 LAB
EST. AVERAGE GLUCOSE BLD GHB EST-MCNC: 120 MG/DL
HBA1C MFR BLD: 5.8 %
PREALB SERPL-MCNC: 19.5 MG/DL (ref 18–40)

## 2025-03-12 LAB — STAPHYLOCOCCUS SPEC CULT: NORMAL

## 2025-03-21 ENCOUNTER — APPOINTMENT (OUTPATIENT)
Dept: CARDIOLOGY | Facility: HOSPITAL | Age: 80
End: 2025-03-21
Payer: MEDICARE

## 2025-03-21 ENCOUNTER — ANESTHESIA EVENT (OUTPATIENT)
Dept: OPERATING ROOM | Facility: HOSPITAL | Age: 80
End: 2025-03-21
Payer: MEDICARE

## 2025-03-21 ENCOUNTER — ANESTHESIA (OUTPATIENT)
Dept: OPERATING ROOM | Facility: HOSPITAL | Age: 80
End: 2025-03-21
Payer: MEDICARE

## 2025-03-21 ENCOUNTER — HOSPITAL ENCOUNTER (OUTPATIENT)
Facility: HOSPITAL | Age: 80
Discharge: HOME | End: 2025-03-22
Attending: NEUROLOGICAL SURGERY | Admitting: NEUROLOGICAL SURGERY
Payer: MEDICARE

## 2025-03-21 ENCOUNTER — APPOINTMENT (OUTPATIENT)
Dept: RADIOLOGY | Facility: HOSPITAL | Age: 80
End: 2025-03-21
Payer: MEDICARE
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
## 2025-03-21 DIAGNOSIS — Z98.890 H/O EXCISION OF LAMINA OF CERVICAL VERTEBRA FOR DECOMPRESSION OF SPINAL CORD: ICD-10-CM

## 2025-03-21 DIAGNOSIS — Z95.5 HISTORY OF CORONARY ARTERY STENT PLACEMENT: ICD-10-CM

## 2025-03-21 DIAGNOSIS — S06.4XAA EPIDURAL HEMATOMA (MULTI): ICD-10-CM

## 2025-03-21 DIAGNOSIS — S09.90XA CLOSED HEAD INJURY, INITIAL ENCOUNTER: ICD-10-CM

## 2025-03-21 DIAGNOSIS — G95.9 CERVICAL MYELOPATHY: Primary | ICD-10-CM

## 2025-03-21 DIAGNOSIS — S14.129D CENTRAL CORD SYNDROME, SUBSEQUENT ENCOUNTER (MULTI): ICD-10-CM

## 2025-03-21 DIAGNOSIS — S12.501A CLOSED NONDISPLACED FRACTURE OF SIXTH CERVICAL VERTEBRA, UNSPECIFIED FRACTURE MORPHOLOGY, INITIAL ENCOUNTER: ICD-10-CM

## 2025-03-21 DIAGNOSIS — M43.12 ACQUIRED SPONDYLOLISTHESIS OF CERVICAL VERTEBRA: ICD-10-CM

## 2025-03-21 LAB
ATRIAL RATE: 74 BPM
GLUCOSE BLD MANUAL STRIP-MCNC: 169 MG/DL (ref 74–99)
P AXIS: 80 DEGREES
P OFFSET: 155 MS
P ONSET: 102 MS
PR INTERVAL: 238 MS
Q ONSET: 221 MS
QRS COUNT: 12 BEATS
QRS DURATION: 82 MS
QT INTERVAL: 404 MS
QTC CALCULATION(BAZETT): 448 MS
QTC FREDERICIA: 433 MS
R AXIS: 37 DEGREES
T AXIS: 67 DEGREES
T OFFSET: 423 MS
VENTRICULAR RATE: 74 BPM

## 2025-03-21 PROCEDURE — C1889 IMPLANT/INSERT DEVICE, NOC: HCPCS | Performed by: NEUROLOGICAL SURGERY

## 2025-03-21 PROCEDURE — 2780000003 HC OR 278 NO HCPCS: Performed by: NEUROLOGICAL SURGERY

## 2025-03-21 PROCEDURE — 3600000018 HC OR TIME - INITIAL BASE CHARGE - PROCEDURE LEVEL SIX: Performed by: NEUROLOGICAL SURGERY

## 2025-03-21 PROCEDURE — 22845 INSERT SPINE FIXATION DEVICE: CPT | Performed by: NEUROLOGICAL SURGERY

## 2025-03-21 PROCEDURE — 2720000007 HC OR 272 NO HCPCS: Performed by: NEUROLOGICAL SURGERY

## 2025-03-21 PROCEDURE — 2500000001 HC RX 250 WO HCPCS SELF ADMINISTERED DRUGS (ALT 637 FOR MEDICARE OP): Performed by: NURSE PRACTITIONER

## 2025-03-21 PROCEDURE — 22551 ARTHRD ANT NTRBDY CERVICAL: CPT | Performed by: NEUROLOGICAL SURGERY

## 2025-03-21 PROCEDURE — 2500000004 HC RX 250 GENERAL PHARMACY W/ HCPCS (ALT 636 FOR OP/ED): Performed by: STUDENT IN AN ORGANIZED HEALTH CARE EDUCATION/TRAINING PROGRAM

## 2025-03-21 PROCEDURE — 3700000002 HC GENERAL ANESTHESIA TIME - EACH INCREMENTAL 1 MINUTE: Performed by: NEUROLOGICAL SURGERY

## 2025-03-21 PROCEDURE — 22853 INSJ BIOMECHANICAL DEVICE: CPT | Performed by: NEUROLOGICAL SURGERY

## 2025-03-21 PROCEDURE — 82947 ASSAY GLUCOSE BLOOD QUANT: CPT

## 2025-03-21 PROCEDURE — 3600000017 HC OR TIME - EACH INCREMENTAL 1 MINUTE - PROCEDURE LEVEL SIX: Performed by: NEUROLOGICAL SURGERY

## 2025-03-21 PROCEDURE — 3700000001 HC GENERAL ANESTHESIA TIME - INITIAL BASE CHARGE: Performed by: NEUROLOGICAL SURGERY

## 2025-03-21 PROCEDURE — C9359 IMPLNT,BON VOID FILLER-PUTTY: HCPCS | Performed by: NEUROLOGICAL SURGERY

## 2025-03-21 PROCEDURE — C1713 ANCHOR/SCREW BN/BN,TIS/BN: HCPCS | Performed by: NEUROLOGICAL SURGERY

## 2025-03-21 PROCEDURE — 2500000004 HC RX 250 GENERAL PHARMACY W/ HCPCS (ALT 636 FOR OP/ED): Performed by: NURSE ANESTHETIST, CERTIFIED REGISTERED

## 2025-03-21 PROCEDURE — 2500000001 HC RX 250 WO HCPCS SELF ADMINISTERED DRUGS (ALT 637 FOR MEDICARE OP): Performed by: STUDENT IN AN ORGANIZED HEALTH CARE EDUCATION/TRAINING PROGRAM

## 2025-03-21 PROCEDURE — 7100000011 HC EXTENDED STAY RECOVERY HOURLY - NURSING UNIT

## 2025-03-21 PROCEDURE — A6010 COLLAGEN BASED WOUND FILLER: HCPCS | Performed by: NEUROLOGICAL SURGERY

## 2025-03-21 PROCEDURE — 7100000002 HC RECOVERY ROOM TIME - EACH INCREMENTAL 1 MINUTE: Performed by: NEUROLOGICAL SURGERY

## 2025-03-21 PROCEDURE — 2500000004 HC RX 250 GENERAL PHARMACY W/ HCPCS (ALT 636 FOR OP/ED): Performed by: NEUROLOGICAL SURGERY

## 2025-03-21 PROCEDURE — 2500000004 HC RX 250 GENERAL PHARMACY W/ HCPCS (ALT 636 FOR OP/ED): Mod: JZ | Performed by: ANESTHESIOLOGY

## 2025-03-21 PROCEDURE — 7100000001 HC RECOVERY ROOM TIME - INITIAL BASE CHARGE: Performed by: NEUROLOGICAL SURGERY

## 2025-03-21 PROCEDURE — 93005 ELECTROCARDIOGRAM TRACING: CPT

## 2025-03-21 DEVICE — PLATE 3001021 ZEVO 21MM 1 LVL
Type: IMPLANTABLE DEVICE | Site: SPINE CERVICAL | Status: FUNCTIONAL
Brand: ZEVO™ ANTERIOR CERVICAL PLATE SYSTEM

## 2025-03-21 DEVICE — IMPLANTABLE DEVICE: Type: IMPLANTABLE DEVICE | Site: SPINE CERVICAL | Status: FUNCTIONAL

## 2025-03-21 DEVICE — PUTTY, GRAFTON, 6CC, W/CANNULA: Type: IMPLANTABLE DEVICE | Site: SPINE CERVICAL | Status: FUNCTIONAL

## 2025-03-21 RX ORDER — OXYCODONE HYDROCHLORIDE 5 MG/1
2.5 TABLET ORAL EVERY 4 HOURS PRN
Status: DISCONTINUED | OUTPATIENT
Start: 2025-03-21 | End: 2025-03-22 | Stop reason: HOSPADM

## 2025-03-21 RX ORDER — OXYCODONE HYDROCHLORIDE 5 MG/1
5 TABLET ORAL EVERY 4 HOURS PRN
Status: DISCONTINUED | OUTPATIENT
Start: 2025-03-21 | End: 2025-03-22 | Stop reason: HOSPADM

## 2025-03-21 RX ORDER — POLYETHYLENE GLYCOL 3350 17 G/17G
17 POWDER, FOR SOLUTION ORAL DAILY
Status: DISCONTINUED | OUTPATIENT
Start: 2025-03-21 | End: 2025-03-22 | Stop reason: HOSPADM

## 2025-03-21 RX ORDER — PANTOPRAZOLE SODIUM 40 MG/1
40 TABLET, DELAYED RELEASE ORAL
Status: DISCONTINUED | OUTPATIENT
Start: 2025-03-22 | End: 2025-03-22 | Stop reason: HOSPADM

## 2025-03-21 RX ORDER — TIZANIDINE 4 MG/1
4 TABLET ORAL EVERY 6 HOURS PRN
Status: DISCONTINUED | OUTPATIENT
Start: 2025-03-21 | End: 2025-03-22 | Stop reason: HOSPADM

## 2025-03-21 RX ORDER — NALOXONE HYDROCHLORIDE 0.4 MG/ML
0.2 INJECTION, SOLUTION INTRAMUSCULAR; INTRAVENOUS; SUBCUTANEOUS EVERY 5 MIN PRN
Status: DISCONTINUED | OUTPATIENT
Start: 2025-03-21 | End: 2025-03-22 | Stop reason: HOSPADM

## 2025-03-21 RX ORDER — CYCLOBENZAPRINE HCL 10 MG
10 TABLET ORAL 3 TIMES DAILY PRN
Status: DISCONTINUED | OUTPATIENT
Start: 2025-03-21 | End: 2025-03-21

## 2025-03-21 RX ORDER — MAGNESIUM SULFATE HEPTAHYDRATE 500 MG/ML
INJECTION, SOLUTION INTRAMUSCULAR; INTRAVENOUS AS NEEDED
Status: DISCONTINUED | OUTPATIENT
Start: 2025-03-21 | End: 2025-03-21

## 2025-03-21 RX ORDER — HYDRALAZINE HYDROCHLORIDE 20 MG/ML
5 INJECTION INTRAMUSCULAR; INTRAVENOUS EVERY 30 MIN PRN
Status: DISCONTINUED | OUTPATIENT
Start: 2025-03-21 | End: 2025-03-21 | Stop reason: HOSPADM

## 2025-03-21 RX ORDER — PHENYLEPHRINE HCL IN 0.9% NACL 0.4MG/10ML
SYRINGE (ML) INTRAVENOUS AS NEEDED
Status: DISCONTINUED | OUTPATIENT
Start: 2025-03-21 | End: 2025-03-21

## 2025-03-21 RX ORDER — CEFAZOLIN SODIUM 2 G/100ML
INJECTION, SOLUTION INTRAVENOUS AS NEEDED
Status: DISCONTINUED | OUTPATIENT
Start: 2025-03-21 | End: 2025-03-21

## 2025-03-21 RX ORDER — LIDOCAINE HYDROCHLORIDE 10 MG/ML
0.1 INJECTION, SOLUTION INFILTRATION; PERINEURAL ONCE
Status: DISCONTINUED | OUTPATIENT
Start: 2025-03-21 | End: 2025-03-21 | Stop reason: HOSPADM

## 2025-03-21 RX ORDER — ACETAMINOPHEN 325 MG/1
650 TABLET ORAL EVERY 6 HOURS
Status: DISCONTINUED | OUTPATIENT
Start: 2025-03-21 | End: 2025-03-22 | Stop reason: HOSPADM

## 2025-03-21 RX ORDER — METOPROLOL SUCCINATE 50 MG/1
50 TABLET, EXTENDED RELEASE ORAL DAILY
Status: DISCONTINUED | OUTPATIENT
Start: 2025-03-21 | End: 2025-03-22 | Stop reason: HOSPADM

## 2025-03-21 RX ORDER — FENTANYL CITRATE 50 UG/ML
12.5 INJECTION, SOLUTION INTRAMUSCULAR; INTRAVENOUS EVERY 5 MIN PRN
Status: DISCONTINUED | OUTPATIENT
Start: 2025-03-21 | End: 2025-03-21 | Stop reason: HOSPADM

## 2025-03-21 RX ORDER — ONDANSETRON HYDROCHLORIDE 2 MG/ML
INJECTION, SOLUTION INTRAVENOUS AS NEEDED
Status: DISCONTINUED | OUTPATIENT
Start: 2025-03-21 | End: 2025-03-21

## 2025-03-21 RX ORDER — SODIUM CHLORIDE, SODIUM LACTATE, POTASSIUM CHLORIDE, CALCIUM CHLORIDE 600; 310; 30; 20 MG/100ML; MG/100ML; MG/100ML; MG/100ML
100 INJECTION, SOLUTION INTRAVENOUS CONTINUOUS
Status: DISCONTINUED | OUTPATIENT
Start: 2025-03-21 | End: 2025-03-21 | Stop reason: HOSPADM

## 2025-03-21 RX ORDER — PHENYLEPHRINE 10 MG/250 ML(40 MCG/ML)IN 0.9 % SOD.CHLORIDE INTRAVENOUS
CONTINUOUS PRN
Status: DISCONTINUED | OUTPATIENT
Start: 2025-03-21 | End: 2025-03-21

## 2025-03-21 RX ORDER — LIDOCAINE HYDROCHLORIDE AND EPINEPHRINE 10; 10 UG/ML; MG/ML
INJECTION, SOLUTION INFILTRATION; PERINEURAL AS NEEDED
Status: DISCONTINUED | OUTPATIENT
Start: 2025-03-21 | End: 2025-03-21 | Stop reason: HOSPADM

## 2025-03-21 RX ORDER — LIDOCAINE HYDROCHLORIDE 20 MG/ML
INJECTION, SOLUTION INFILTRATION; PERINEURAL AS NEEDED
Status: DISCONTINUED | OUTPATIENT
Start: 2025-03-21 | End: 2025-03-21

## 2025-03-21 RX ORDER — HEPARIN SODIUM 5000 [USP'U]/ML
5000 INJECTION, SOLUTION INTRAVENOUS; SUBCUTANEOUS EVERY 8 HOURS
Status: DISCONTINUED | OUTPATIENT
Start: 2025-03-22 | End: 2025-03-22 | Stop reason: HOSPADM

## 2025-03-21 RX ORDER — FENTANYL CITRATE 50 UG/ML
INJECTION, SOLUTION INTRAMUSCULAR; INTRAVENOUS AS NEEDED
Status: DISCONTINUED | OUTPATIENT
Start: 2025-03-21 | End: 2025-03-21

## 2025-03-21 RX ORDER — MIDAZOLAM HYDROCHLORIDE 1 MG/ML
INJECTION, SOLUTION INTRAMUSCULAR; INTRAVENOUS CONTINUOUS PRN
Status: DISCONTINUED | OUTPATIENT
Start: 2025-03-21 | End: 2025-03-21

## 2025-03-21 RX ORDER — HYDROMORPHONE HYDROCHLORIDE 1 MG/ML
INJECTION, SOLUTION INTRAMUSCULAR; INTRAVENOUS; SUBCUTANEOUS AS NEEDED
Status: DISCONTINUED | OUTPATIENT
Start: 2025-03-21 | End: 2025-03-21

## 2025-03-21 RX ORDER — MIDAZOLAM HYDROCHLORIDE 1 MG/ML
1 INJECTION, SOLUTION INTRAMUSCULAR; INTRAVENOUS ONCE AS NEEDED
Status: DISCONTINUED | OUTPATIENT
Start: 2025-03-21 | End: 2025-03-21 | Stop reason: HOSPADM

## 2025-03-21 RX ORDER — PROPOFOL 10 MG/ML
INJECTION, EMULSION INTRAVENOUS AS NEEDED
Status: DISCONTINUED | OUTPATIENT
Start: 2025-03-21 | End: 2025-03-21

## 2025-03-21 RX ORDER — DEXAMETHASONE SODIUM PHOSPHATE 10 MG/ML
INJECTION INTRAMUSCULAR; INTRAVENOUS AS NEEDED
Status: DISCONTINUED | OUTPATIENT
Start: 2025-03-21 | End: 2025-03-21

## 2025-03-21 RX ORDER — ASPIRIN 81 MG/1
81 TABLET ORAL DAILY
Status: DISCONTINUED | OUTPATIENT
Start: 2025-03-21 | End: 2025-03-22 | Stop reason: HOSPADM

## 2025-03-21 RX ORDER — MORPHINE SULFATE 30 MG/1
30 TABLET, FILM COATED, EXTENDED RELEASE ORAL EVERY 12 HOURS SCHEDULED
Status: DISCONTINUED | OUTPATIENT
Start: 2025-03-21 | End: 2025-03-22 | Stop reason: HOSPADM

## 2025-03-21 RX ORDER — METOCLOPRAMIDE HYDROCHLORIDE 5 MG/ML
10 INJECTION INTRAMUSCULAR; INTRAVENOUS EVERY 6 HOURS PRN
Status: DISCONTINUED | OUTPATIENT
Start: 2025-03-21 | End: 2025-03-22 | Stop reason: HOSPADM

## 2025-03-21 RX ORDER — HYDROMORPHONE HYDROCHLORIDE 1 MG/ML
1 INJECTION, SOLUTION INTRAMUSCULAR; INTRAVENOUS; SUBCUTANEOUS EVERY 5 MIN PRN
Status: DISCONTINUED | OUTPATIENT
Start: 2025-03-21 | End: 2025-03-21 | Stop reason: HOSPADM

## 2025-03-21 RX ORDER — OXYCODONE HYDROCHLORIDE 5 MG/1
5 TABLET ORAL EVERY 4 HOURS PRN
Status: DISCONTINUED | OUTPATIENT
Start: 2025-03-21 | End: 2025-03-21 | Stop reason: HOSPADM

## 2025-03-21 RX ORDER — ALBUTEROL SULFATE 0.83 MG/ML
2.5 SOLUTION RESPIRATORY (INHALATION) EVERY 4 HOURS PRN
Status: DISCONTINUED | OUTPATIENT
Start: 2025-03-21 | End: 2025-03-22 | Stop reason: HOSPADM

## 2025-03-21 RX ORDER — ATORVASTATIN CALCIUM 40 MG/1
40 TABLET, FILM COATED ORAL NIGHTLY
Status: DISCONTINUED | OUTPATIENT
Start: 2025-03-21 | End: 2025-03-22 | Stop reason: HOSPADM

## 2025-03-21 RX ORDER — ACETAMINOPHEN 325 MG/1
975 TABLET ORAL ONCE
Status: DISCONTINUED | OUTPATIENT
Start: 2025-03-21 | End: 2025-03-21 | Stop reason: HOSPADM

## 2025-03-21 RX ORDER — MEPERIDINE HYDROCHLORIDE 50 MG/ML
12.5 INJECTION INTRAMUSCULAR; INTRAVENOUS; SUBCUTANEOUS EVERY 10 MIN PRN
Status: DISCONTINUED | OUTPATIENT
Start: 2025-03-21 | End: 2025-03-21 | Stop reason: HOSPADM

## 2025-03-21 RX ORDER — ALBUTEROL SULFATE 0.83 MG/ML
2.5 SOLUTION RESPIRATORY (INHALATION) ONCE AS NEEDED
Status: DISCONTINUED | OUTPATIENT
Start: 2025-03-21 | End: 2025-03-21 | Stop reason: HOSPADM

## 2025-03-21 RX ORDER — DIPHENHYDRAMINE HYDROCHLORIDE 50 MG/ML
12.5 INJECTION, SOLUTION INTRAMUSCULAR; INTRAVENOUS ONCE AS NEEDED
Status: DISCONTINUED | OUTPATIENT
Start: 2025-03-21 | End: 2025-03-21 | Stop reason: HOSPADM

## 2025-03-21 RX ORDER — ONDANSETRON HYDROCHLORIDE 2 MG/ML
4 INJECTION, SOLUTION INTRAVENOUS ONCE AS NEEDED
Status: DISCONTINUED | OUTPATIENT
Start: 2025-03-21 | End: 2025-03-21 | Stop reason: HOSPADM

## 2025-03-21 RX ORDER — ALBUTEROL SULFATE 90 UG/1
2 INHALANT RESPIRATORY (INHALATION) 4 TIMES DAILY PRN
Status: DISCONTINUED | OUTPATIENT
Start: 2025-03-21 | End: 2025-03-21

## 2025-03-21 RX ORDER — ONDANSETRON 4 MG/1
4 TABLET, FILM COATED ORAL EVERY 8 HOURS PRN
Status: DISCONTINUED | OUTPATIENT
Start: 2025-03-21 | End: 2025-03-22 | Stop reason: HOSPADM

## 2025-03-21 RX ORDER — ONDANSETRON HYDROCHLORIDE 2 MG/ML
4 INJECTION, SOLUTION INTRAVENOUS EVERY 8 HOURS PRN
Status: DISCONTINUED | OUTPATIENT
Start: 2025-03-21 | End: 2025-03-22 | Stop reason: HOSPADM

## 2025-03-21 RX ORDER — METOCLOPRAMIDE 10 MG/1
10 TABLET ORAL EVERY 6 HOURS PRN
Status: DISCONTINUED | OUTPATIENT
Start: 2025-03-21 | End: 2025-03-22 | Stop reason: HOSPADM

## 2025-03-21 RX ORDER — OXYCODONE HYDROCHLORIDE 10 MG/1
10 TABLET ORAL EVERY 4 HOURS PRN
Status: DISCONTINUED | OUTPATIENT
Start: 2025-03-21 | End: 2025-03-22 | Stop reason: HOSPADM

## 2025-03-21 RX ORDER — ROCURONIUM BROMIDE 10 MG/ML
INJECTION, SOLUTION INTRAVENOUS AS NEEDED
Status: DISCONTINUED | OUTPATIENT
Start: 2025-03-21 | End: 2025-03-21

## 2025-03-21 RX ORDER — AMOXICILLIN 250 MG
2 CAPSULE ORAL 2 TIMES DAILY
Status: DISCONTINUED | OUTPATIENT
Start: 2025-03-21 | End: 2025-03-22 | Stop reason: HOSPADM

## 2025-03-21 RX ORDER — DEXTROSE 50 % IN WATER (D50W) INTRAVENOUS SYRINGE
25
Status: DISCONTINUED | OUTPATIENT
Start: 2025-03-21 | End: 2025-03-22 | Stop reason: HOSPADM

## 2025-03-21 RX ORDER — DEXTROSE 50 % IN WATER (D50W) INTRAVENOUS SYRINGE
12.5
Status: DISCONTINUED | OUTPATIENT
Start: 2025-03-21 | End: 2025-03-22 | Stop reason: HOSPADM

## 2025-03-21 RX ADMIN — HYDROMORPHONE HYDROCHLORIDE 0.2 MG: 1 INJECTION, SOLUTION INTRAMUSCULAR; INTRAVENOUS; SUBCUTANEOUS at 15:37

## 2025-03-21 RX ADMIN — HYDROMORPHONE HYDROCHLORIDE 0.25 MG: 1 INJECTION, SOLUTION INTRAMUSCULAR; INTRAVENOUS; SUBCUTANEOUS at 09:50

## 2025-03-21 RX ADMIN — ACETAMINOPHEN 650 MG: 325 TABLET ORAL at 20:41

## 2025-03-21 RX ADMIN — MAGNESIUM SULFATE HEPTAHYDRATE 2 G: 500 INJECTION, SOLUTION INTRAMUSCULAR; INTRAVENOUS at 08:10

## 2025-03-21 RX ADMIN — MORPHINE SULFATE 30 MG: 30 TABLET, EXTENDED RELEASE ORAL at 20:41

## 2025-03-21 RX ADMIN — Medication 100 MCG: at 08:14

## 2025-03-21 RX ADMIN — Medication 0.4 MCG/KG/MIN: at 08:36

## 2025-03-21 RX ADMIN — FENTANYL CITRATE 100 MCG: 50 INJECTION, SOLUTION INTRAMUSCULAR; INTRAVENOUS at 07:47

## 2025-03-21 RX ADMIN — ATORVASTATIN CALCIUM 40 MG: 40 TABLET, FILM COATED ORAL at 20:41

## 2025-03-21 RX ADMIN — SENNOSIDES AND DOCUSATE SODIUM 2 TABLET: 50; 8.6 TABLET ORAL at 20:41

## 2025-03-21 RX ADMIN — ROCURONIUM BROMIDE 10 MG: 10 INJECTION INTRAVENOUS at 08:19

## 2025-03-21 RX ADMIN — LIDOCAINE HYDROCHLORIDE 80 MG: 20 INJECTION, SOLUTION INFILTRATION; PERINEURAL at 07:47

## 2025-03-21 RX ADMIN — PROPOFOL 150 MG: 10 INJECTION, EMULSION INTRAVENOUS at 07:47

## 2025-03-21 RX ADMIN — ROCURONIUM BROMIDE 50 MG: 10 INJECTION INTRAVENOUS at 07:47

## 2025-03-21 RX ADMIN — HYDROMORPHONE HYDROCHLORIDE 1 MG: 1 INJECTION, SOLUTION INTRAMUSCULAR; INTRAVENOUS; SUBCUTANEOUS at 11:15

## 2025-03-21 RX ADMIN — HYDROMORPHONE HYDROCHLORIDE 0.5 MG: 1 INJECTION, SOLUTION INTRAMUSCULAR; INTRAVENOUS; SUBCUTANEOUS at 10:25

## 2025-03-21 RX ADMIN — HYDROMORPHONE HYDROCHLORIDE 0.25 MG: 1 INJECTION, SOLUTION INTRAMUSCULAR; INTRAVENOUS; SUBCUTANEOUS at 08:18

## 2025-03-21 RX ADMIN — Medication 100 MCG: at 08:27

## 2025-03-21 RX ADMIN — OXYCODONE HYDROCHLORIDE 10 MG: 10 TABLET ORAL at 17:50

## 2025-03-21 RX ADMIN — ONDANSETRON 4 MG: 2 INJECTION INTRAMUSCULAR; INTRAVENOUS at 08:06

## 2025-03-21 RX ADMIN — ACETAMINOPHEN 650 MG: 325 TABLET ORAL at 13:20

## 2025-03-21 RX ADMIN — HYDROMORPHONE HYDROCHLORIDE 0.5 MG: 1 INJECTION, SOLUTION INTRAMUSCULAR; INTRAVENOUS; SUBCUTANEOUS at 10:05

## 2025-03-21 RX ADMIN — DEXAMETHASONE SODIUM PHOSPHATE 10 MG: 10 INJECTION INTRAMUSCULAR; INTRAVENOUS at 08:06

## 2025-03-21 RX ADMIN — PROPOFOL 50 MG: 10 INJECTION, EMULSION INTRAVENOUS at 07:57

## 2025-03-21 RX ADMIN — HYDROMORPHONE HYDROCHLORIDE 0.25 MG: 1 INJECTION, SOLUTION INTRAMUSCULAR; INTRAVENOUS; SUBCUTANEOUS at 09:11

## 2025-03-21 RX ADMIN — HYDROMORPHONE HYDROCHLORIDE 0.25 MG: 1 INJECTION, SOLUTION INTRAMUSCULAR; INTRAVENOUS; SUBCUTANEOUS at 09:28

## 2025-03-21 RX ADMIN — OXYCODONE HYDROCHLORIDE 10 MG: 10 TABLET ORAL at 13:20

## 2025-03-21 RX ADMIN — SUGAMMADEX 200 MG: 100 INJECTION, SOLUTION INTRAVENOUS at 09:25

## 2025-03-21 RX ADMIN — Medication 200 MCG: at 08:37

## 2025-03-21 RX ADMIN — METOPROLOL SUCCINATE 50 MG: 50 TABLET, EXTENDED RELEASE ORAL at 20:41

## 2025-03-21 RX ADMIN — HYDROMORPHONE HYDROCHLORIDE 0.5 MG: 1 INJECTION, SOLUTION INTRAMUSCULAR; INTRAVENOUS; SUBCUTANEOUS at 10:50

## 2025-03-21 RX ADMIN — SODIUM CHLORIDE, POTASSIUM CHLORIDE, SODIUM LACTATE AND CALCIUM CHLORIDE: 600; 310; 30; 20 INJECTION, SOLUTION INTRAVENOUS at 07:30

## 2025-03-21 RX ADMIN — CEFAZOLIN SODIUM 2 G: 2 INJECTION, SOLUTION INTRAVENOUS at 08:06

## 2025-03-21 RX ADMIN — METOCLOPRAMIDE 10 MG: 5 INJECTION, SOLUTION INTRAMUSCULAR; INTRAVENOUS at 10:51

## 2025-03-21 SDOH — ECONOMIC STABILITY: FOOD INSECURITY: WITHIN THE PAST 12 MONTHS, THE FOOD YOU BOUGHT JUST DIDN'T LAST AND YOU DIDN'T HAVE MONEY TO GET MORE.: PATIENT DECLINED

## 2025-03-21 SDOH — SOCIAL STABILITY: SOCIAL INSECURITY: HAVE YOU HAD ANY THOUGHTS OF HARMING ANYONE ELSE?: NO

## 2025-03-21 SDOH — SOCIAL STABILITY: SOCIAL INSECURITY: WERE YOU ABLE TO COMPLETE ALL THE BEHAVIORAL HEALTH SCREENINGS?: YES

## 2025-03-21 SDOH — SOCIAL STABILITY: SOCIAL INSECURITY: DO YOU FEEL UNSAFE GOING BACK TO THE PLACE WHERE YOU ARE LIVING?: NO

## 2025-03-21 SDOH — SOCIAL STABILITY: SOCIAL INSECURITY: WITHIN THE LAST YEAR, HAVE YOU BEEN AFRAID OF YOUR PARTNER OR EX-PARTNER?: NO

## 2025-03-21 SDOH — SOCIAL STABILITY: SOCIAL INSECURITY: ABUSE: ADULT

## 2025-03-21 SDOH — ECONOMIC STABILITY: INCOME INSECURITY
IN THE PAST 12 MONTHS HAS THE ELECTRIC, GAS, OIL, OR WATER COMPANY THREATENED TO SHUT OFF SERVICES IN YOUR HOME?: PATIENT DECLINED

## 2025-03-21 SDOH — SOCIAL STABILITY: SOCIAL INSECURITY: HAVE YOU HAD THOUGHTS OF HARMING ANYONE ELSE?: NO

## 2025-03-21 SDOH — SOCIAL STABILITY: SOCIAL INSECURITY: WITHIN THE LAST YEAR, HAVE YOU BEEN HUMILIATED OR EMOTIONALLY ABUSED IN OTHER WAYS BY YOUR PARTNER OR EX-PARTNER?: NO

## 2025-03-21 SDOH — ECONOMIC STABILITY: FOOD INSECURITY
WITHIN THE PAST 12 MONTHS, YOU WORRIED THAT YOUR FOOD WOULD RUN OUT BEFORE YOU GOT THE MONEY TO BUY MORE.: PATIENT DECLINED

## 2025-03-21 SDOH — SOCIAL STABILITY: SOCIAL INSECURITY: DOES ANYONE TRY TO KEEP YOU FROM HAVING/CONTACTING OTHER FRIENDS OR DOING THINGS OUTSIDE YOUR HOME?: NO

## 2025-03-21 SDOH — SOCIAL STABILITY: SOCIAL INSECURITY: ARE THERE ANY APPARENT SIGNS OF INJURIES/BEHAVIORS THAT COULD BE RELATED TO ABUSE/NEGLECT?: NO

## 2025-03-21 SDOH — SOCIAL STABILITY: SOCIAL INSECURITY: ARE YOU OR HAVE YOU BEEN THREATENED OR ABUSED PHYSICALLY, EMOTIONALLY, OR SEXUALLY BY ANYONE?: NO

## 2025-03-21 SDOH — SOCIAL STABILITY: SOCIAL INSECURITY: DO YOU FEEL ANYONE HAS EXPLOITED OR TAKEN ADVANTAGE OF YOU FINANCIALLY OR OF YOUR PERSONAL PROPERTY?: NO

## 2025-03-21 SDOH — SOCIAL STABILITY: SOCIAL INSECURITY: HAS ANYONE EVER THREATENED TO HURT YOUR FAMILY OR YOUR PETS?: NO

## 2025-03-21 ASSESSMENT — PAIN SCALES - GENERAL
PAINLEVEL_OUTOF10: 3
PAINLEVEL_OUTOF10: 9
PAINLEVEL_OUTOF10: 6
PAINLEVEL_OUTOF10: 5 - MODERATE PAIN
PAINLEVEL_OUTOF10: 5 - MODERATE PAIN
PAINLEVEL_OUTOF10: 6
PAINLEVEL_OUTOF10: 5 - MODERATE PAIN
PAINLEVEL_OUTOF10: 8
PAINLEVEL_OUTOF10: 8
PAINLEVEL_OUTOF10: 5 - MODERATE PAIN
PAINLEVEL_OUTOF10: 5 - MODERATE PAIN
PAINLEVEL_OUTOF10: 0 - NO PAIN
PAINLEVEL_OUTOF10: 0 - NO PAIN
PAINLEVEL_OUTOF10: 6
PAINLEVEL_OUTOF10: 5 - MODERATE PAIN

## 2025-03-21 ASSESSMENT — LIFESTYLE VARIABLES
HOW OFTEN DO YOU HAVE A DRINK CONTAINING ALCOHOL: MONTHLY OR LESS
AUDIT-C TOTAL SCORE: 1
AUDIT-C TOTAL SCORE: 1
SKIP TO QUESTIONS 9-10: 1
SUBSTANCE_ABUSE_PAST_12_MONTHS: NO
HOW MANY STANDARD DRINKS CONTAINING ALCOHOL DO YOU HAVE ON A TYPICAL DAY: 1 OR 2
PRESCIPTION_ABUSE_PAST_12_MONTHS: NO
HOW OFTEN DO YOU HAVE 6 OR MORE DRINKS ON ONE OCCASION: NEVER

## 2025-03-21 ASSESSMENT — COGNITIVE AND FUNCTIONAL STATUS - GENERAL
STANDING UP FROM CHAIR USING ARMS: A LITTLE
HELP NEEDED FOR BATHING: A LITTLE
MOBILITY SCORE: 18
MOVING FROM LYING ON BACK TO SITTING ON SIDE OF FLAT BED WITH BEDRAILS: A LITTLE
DRESSING REGULAR LOWER BODY CLOTHING: A LITTLE
PERSONAL GROOMING: A LITTLE
MOVING TO AND FROM BED TO CHAIR: A LITTLE
EATING MEALS: A LITTLE
TOILETING: A LITTLE
WALKING IN HOSPITAL ROOM: A LITTLE
CLIMB 3 TO 5 STEPS WITH RAILING: A LITTLE
DAILY ACTIVITIY SCORE: 18
TURNING FROM BACK TO SIDE WHILE IN FLAT BAD: A LITTLE
DRESSING REGULAR UPPER BODY CLOTHING: A LITTLE
PATIENT BASELINE BEDBOUND: NO

## 2025-03-21 ASSESSMENT — PAIN DESCRIPTION - DESCRIPTORS
DESCRIPTORS: ACHING

## 2025-03-21 ASSESSMENT — PAIN - FUNCTIONAL ASSESSMENT

## 2025-03-21 ASSESSMENT — ACTIVITIES OF DAILY LIVING (ADL)
TOILETING: INDEPENDENT
PATIENT'S MEMORY ADEQUATE TO SAFELY COMPLETE DAILY ACTIVITIES?: YES
LACK_OF_TRANSPORTATION: PATIENT DECLINED
FEEDING YOURSELF: INDEPENDENT
GROOMING: INDEPENDENT
BATHING: INDEPENDENT
DRESSING YOURSELF: INDEPENDENT
ASSISTIVE_DEVICE: CONTACTS;EYEGLASSES
HEARING - LEFT EAR: FUNCTIONAL
JUDGMENT_ADEQUATE_SAFELY_COMPLETE_DAILY_ACTIVITIES: YES
ADEQUATE_TO_COMPLETE_ADL: YES
HEARING - RIGHT EAR: FUNCTIONAL
WALKS IN HOME: INDEPENDENT

## 2025-03-21 ASSESSMENT — COLUMBIA-SUICIDE SEVERITY RATING SCALE - C-SSRS
2. HAVE YOU ACTUALLY HAD ANY THOUGHTS OF KILLING YOURSELF?: NO
6. HAVE YOU EVER DONE ANYTHING, STARTED TO DO ANYTHING, OR PREPARED TO DO ANYTHING TO END YOUR LIFE?: NO
1. IN THE PAST MONTH, HAVE YOU WISHED YOU WERE DEAD OR WISHED YOU COULD GO TO SLEEP AND NOT WAKE UP?: NO
2. HAVE YOU ACTUALLY HAD ANY THOUGHTS OF KILLING YOURSELF?: NO
6. HAVE YOU EVER DONE ANYTHING, STARTED TO DO ANYTHING, OR PREPARED TO DO ANYTHING TO END YOUR LIFE?: NO
1. IN THE PAST MONTH, HAVE YOU WISHED YOU WERE DEAD OR WISHED YOU COULD GO TO SLEEP AND NOT WAKE UP?: NO

## 2025-03-21 ASSESSMENT — PATIENT HEALTH QUESTIONNAIRE - PHQ9
2. FEELING DOWN, DEPRESSED OR HOPELESS: NOT AT ALL
1. LITTLE INTEREST OR PLEASURE IN DOING THINGS: NOT AT ALL
SUM OF ALL RESPONSES TO PHQ9 QUESTIONS 1 & 2: 0

## 2025-03-21 NOTE — ANESTHESIA POSTPROCEDURE EVALUATION
"Patient: Naveen Arauz \"Rick\"    Procedure Summary       Date: 03/21/25 Room / Location: [facility] OR 10 / [facility]    Anesthesia Start: 0741 Anesthesia Stop: 0951    Procedure: C5-6 anterior cervical discectomy fusion, with cervical traction (Neck) Diagnosis:       Acquired spondylolisthesis of cervical vertebra      Central cord syndrome, subsequent encounter (Multi)      H/O excision of lamina of cervical vertebra for decompression of spinal cord      (Acquired spondylolisthesis of cervical vertebra [M43.12])      (Central cord syndrome, subsequent encounter (Multi) [S14.129D])      (H/O excision of lamina of cervical vertebra for decompression of spinal cord [Z98.890])    Surgeons: Ramiro Perez MD PhD Responsible Provider: Chuck Reich DO    Anesthesia Type: general ASA Status: 3            Anesthesia Type: general    Vitals Value Taken Time   /76 03/21/25 0949   Temp 36.4 03/21/25 0951   Pulse 69 03/21/25 0951   Resp 18 03/21/25 0951   SpO2 100 03/21/25 0951   Vitals shown include unfiled device data.    Anesthesia Post Evaluation    Patient location during evaluation: PACU  Patient participation: complete - patient participated  Level of consciousness: awake  Pain management: adequate  Multimodal analgesia pain management approach  Airway patency: patent  Cardiovascular status: acceptable, hemodynamically stable and stable  Respiratory status: acceptable and face mask  Hydration status: acceptable  Postoperative Nausea and Vomiting: none      No notable events documented.    "

## 2025-03-21 NOTE — ANESTHESIA PROCEDURE NOTES
Airway  Date/Time: 3/21/2025 7:49 AM  Urgency: elective    Airway not difficult    Staffing  Performed: CRNA and attending   Authorized by: Chuck Reich DO    Performed by: SIHRLEY Oropeza-TIMMY  Patient location during procedure: OR    Indications and Patient Condition  Indications for airway management: anesthesia  Spontaneous Ventilation: absent  Sedation level: deep  Preoxygenated: yes  Patient position: sniffing  MILS maintained throughout  Mask difficulty assessment: 1 - vent by mask    Final Airway Details  Final airway type: endotracheal airway      Successful airway: ETT  Cuffed: yes   Successful intubation technique: video laryngoscopy  Facilitating devices/methods: intubating stylet  Endotracheal tube insertion site: oral  Blade size: #4  ETT size (mm): 7.5  Cormack-Lehane Classification: grade I - full view of glottis  Placement verified by: chest auscultation and capnometry   Cuff volume (mL): 5  Measured from: lips  ETT to lips (cm): 23  Number of attempts at approach: 1  Number of other approaches attempted: 0

## 2025-03-21 NOTE — OP NOTE
"C5-6 anterior cervical discectomy fusion with cervical traction Operative Note     Date: 3/21/2025  OR Location: [facility] OR    Name: Naveen Arauz \"Rick\", : 1945, Age: 80 y.o., MRN: [id number], Sex: male    Diagnosis  Pre-op Diagnosis      * Acquired spondylolisthesis of cervical vertebra [M43.12]     * Central cord syndrome, subsequent encounter (Multi) [S14.129D]     * H/O excision of lamina of cervical vertebra for decompression of spinal cord [Z98.890] Post-op Diagnosis     * Acquired spondylolisthesis of cervical vertebra [M43.12]     * Central cord syndrome, subsequent encounter (Multi) [S14.129D]     * H/O excision of lamina of cervical vertebra for decompression of spinal cord [Z98.890]     Procedures  C5-6 anterior cervical discectomy fusion, with cervical traction  10296 - LA ARTHRD ANT INTERBODY DECOMPRESS CERVICAL BELW C2    LA ANTERIOR INSTRUMENTATION 2-3 VERTEBRAL SEGMENTS [03904]  LA ALLOGRAFT FOR SPINE SURGERY ONLY MORSELIZED []  LA INSJ BIOMCHN DEV INTERVERTEBRAL DSC SPC W/ARTHRD [78956]    Surgeons      * Ramiro Perez - Primary    Resident/Fellow/Other Assistant:  Surgeons and Role:     * Guy Cuenca MD - Resident - Assisting    Staff:   Surgical Assistant:   Suzanneub Person: Francisca  Circulator: Dallas  Scrub Person: Adalberto Palaciosub Person: Twyla    Anesthesia Staff: Anesthesiologist: Chuck Reich DO  CRNA: SHIRLEY Oropeza-CRNA    Procedure Summary  Anesthesia: Anesthesia type not filed in the log.  ASA: III  Estimated Blood Loss: 20 mL  Intra-op Medications:   Administrations occurring from 0730 to 1130 on 25:   Medication Name Total Dose   lidocaine-epinephrine (Xylocaine W/EPI) 1 %-1:100,000 injection 10 mL   ceFAZolin (Ancef) IV 2 g in 100 mL dextrose (iso) - premix 2 g   dexAMETHasone (Decadron) PF injection 10 mg/mL 10 mg   fentaNYL (Sublimaze) injection 50 mcg/mL 100 mcg   HYDROmorphone (Dilaudid) injection 1 mg/mL 0.75 mg   LR bolus Cannot be calculated " "  lidocaine (Xylocaine) injection 2 % 80 mg   magnesium sulfate 50 % injection 2 g   ondansetron (Zofran) 2 mg/mL injection 4 mg   phenylephrine (Iban-Synephrine) 10 mg in sodium chloride 0.9% 250 mL (0.04 mg/mL) infusion (premix) 0.93 mg   phenylephrine 40 mcg/mL syringe 10 mL 400 mcg   propofol (Diprivan) injection 10 mg/mL 200 mg   rocuronium (ZeMuron) 50 mg/5 mL injection 60 mg   sugammadex (Bridion) 200 mg/2 mL injection 200 mg              Anesthesia Record               Intraprocedure I/O Totals          Intake    Phenylephrine Drip 0.00 mL    The total shown is the total volume documented since Anesthesia Start was filed.    Total Intake 0 mL          Specimen: No specimens collected              Drains and/or Catheters:   Closed/Suction Drain 1 Neck Bulb 10 Fr. (Active)           Implants:  Implants       Type Name Action Serial No.      Graft PUTTY, JUICE, 6CC, W/CANNULA - [id number] - [id number] Implanted [id number]     Other 9MM X 16MM X 14MM X 10 DEGREE INTERBODY SPACER Implanted      Screw PLATE, ZEVO, 21MM, 1 LEVEL - [id number] Implanted      Screw SCREW, ZEVO EDE SD, 3.5MM X 17MM - [id number] Implanted               Findings: excellent reduction of spondylolisthesis    Indications: Naveen Arauz \"Rick\" is an 80 y.o. male who is having surgery for Acquired spondylolisthesis of cervical vertebra [M43.12]  Central cord syndrome, subsequent encounter (Multi) [S14.129D]  H/O excision of lamina of cervical vertebra for decompression of spinal cord [Z98.890].    The patient was seen in the preoperative area. The risks, benefits, complications, treatment options, non-operative alternatives, expected recovery and outcomes were discussed with the patient. The possibilities of reaction to medication, pulmonary aspiration, injury to surrounding structures, bleeding, recurrent infection, the need for additional procedures, failure to diagnose a condition, and creating a complication requiring transfusion or " operation were discussed with the patient. The patient concurred with the proposed plan, giving informed consent.  The site of surgery was properly noted/marked if necessary per policy. The patient has been actively warmed in preoperative area. Preoperative antibiotics have been ordered and given within 1 hours of incision. Venous thrombosis prophylaxis have been ordered including bilateral sequential compression devices.    Procedure Details: The patient was brought to the operating room. After patient identification and procedure confirmation, an endotracheal intubation was performed. A bump was placed beneath the patient to effect cervical extension. Gonzales-Ideedock tongs were fixed to the head and 15 lbs. of weight was added for traction. A right-sided horizontal incision was designed. The field was prepped and draped in the usual sterile fashion. A time out was performed and perioperative intravenous antibiotics were confirmed. After infiltration with local anesthetic, the skin was incised with a #15 blade. With the use of monopolar electrocautery, Metzenbaum scissors and blunt dissection, we exposed and divided the platysma muscle horizontally. Starting at the medial aspect of the sternocleidomastoid muscle, we bluntly dissected down to the prevertebral fascia. We then used Kittner swabs to finish our blunt dissection down to the anterior cervical spine. Fluoroscopy was used to confirm the correct level. We then used monopolar electrocautery to reflect the longus colli muscles laterally and define the C5-C6 vertebral bodies and intervening disc space. Celina pins were used to distract open the C5-C6 disc space. The operating microscope was then brought into the field. A total C5-C6 discectomy was performed with the use of Kerrison rongeurs, curettes and a high-speed drill, followed by resection of the visualized posterior longitudinal ligament. Complete foraminotomies were performed bilaterally with the use of  Kerrison rongeurs and confirmed with the blunt nerve hook. Further endplate preparation was completed with curettes. The surgical field was then copiously irrigated. Next, a trial was inserted into the disc space, followed by placement of a lordotic PEEK cage (Medtronic) under direct fluoroscopy. The cage had been pre-packed with allograft (Medtronic). The traction weight was then removed. Next, an anterior plate extending from C5 to C6 was positioned and fixed in place with screws into the vertebral bodies  at each level. The locking tabs were then turned into position. Fluoroscopy was used to confirm proper placement of the instrumentation and showed excellent correction of the spondylolisthesis. We then turned our attention to the closure. The surgical field was copiously irrigated. Excellent hemostasis was achieved. A subplatysmal drain was placed. The platysmal and subcutaneous layers were closed with interrupted 3-0 Vicryl sutures. The skin was closed with topical adhesive.    Complications:  None; patient tolerated the procedure well.    Disposition: PACU - hemodynamically stable.  Condition: stable     Additional Details: none    Attending Attestation: I was present and scrubbed for the entire procedure.    Ramiro Perez  Phone Number: [phone]

## 2025-03-21 NOTE — ANESTHESIA PREPROCEDURE EVALUATION
"Patient: Naveen Arauz \"Rick\"    Procedure Information       Date/Time: 03/21/25 0730    Procedure: C5-6 anterior cervical discectomy fusion, with cervical traction - DOCTOR REQUESTS 2HRS FOR THIS PROCEDURE  ALL CPT CODES FOR THIS PROCEDURE  26734, 64444, 81431, 43493    [facility]    Surgeons: Ramiro Perez MD PhD            Relevant Problems   Cardiac   (+) Coronary artery disease   (+) Essential hypertension   (+) Paroxysmal atrial fibrillation (Multi)      Pulmonary   (+) Pneumonia of both lower lobes due to infectious organism      GI   (+) Gastroesophageal reflux disease      /Renal   (+) BPH (benign prostatic hyperplasia)      ID   (+) Lyme disease   (+) Pneumonia of both lower lobes due to infectious organism   (+) RSV (respiratory syncytial virus infection)       Clinical information reviewed:   Tobacco  Allergies  Meds   Med Hx  Surg Hx   Fam Hx  Soc Hx        NPO Detail:  NPO/Void Status  Date of Last Liquid: 03/21/25  Time of Last Liquid: 0300  Date of Last Solid: 03/20/25  Time of Last Solid: 1900  Last Intake Type: Clear fluids  Time of Last Void: 0630         Physical Exam    Airway  Mallampati: II  TM distance: >3 FB  Neck ROM: full     Cardiovascular - normal exam     Dental    Pulmonary - normal exam     Abdominal - normal exam             Anesthesia Plan    History of general anesthesia?: yes  History of complications of general anesthesia?: no    ASA 3     general     intravenous induction   Anesthetic plan and risks discussed with patient.    Plan discussed with CRNA.      "

## 2025-03-22 ENCOUNTER — APPOINTMENT (OUTPATIENT)
Dept: RADIOLOGY | Facility: HOSPITAL | Age: 80
End: 2025-03-22
Payer: MEDICARE

## 2025-03-22 VITALS
DIASTOLIC BLOOD PRESSURE: 78 MMHG | TEMPERATURE: 97.7 F | BODY MASS INDEX: 21.67 KG/M2 | HEIGHT: 72 IN | HEART RATE: 71 BPM | OXYGEN SATURATION: 96 % | RESPIRATION RATE: 20 BRPM | SYSTOLIC BLOOD PRESSURE: 131 MMHG | WEIGHT: 160 LBS

## 2025-03-22 LAB
ANION GAP SERPL CALC-SCNC: 13 MMOL/L (ref 10–20)
BUN SERPL-MCNC: 14 MG/DL (ref 6–23)
CALCIUM SERPL-MCNC: 8.5 MG/DL (ref 8.6–10.3)
CHLORIDE SERPL-SCNC: 104 MMOL/L (ref 98–107)
CO2 SERPL-SCNC: 24 MMOL/L (ref 21–32)
CREAT SERPL-MCNC: 0.74 MG/DL (ref 0.5–1.3)
EGFRCR SERPLBLD CKD-EPI 2021: >90 ML/MIN/1.73M*2
ERYTHROCYTE [DISTWIDTH] IN BLOOD BY AUTOMATED COUNT: 12.9 % (ref 11.5–14.5)
GLUCOSE SERPL-MCNC: 113 MG/DL (ref 74–99)
HCT VFR BLD AUTO: 36.6 % (ref 41–52)
HGB BLD-MCNC: 11.4 G/DL (ref 13.5–17.5)
MCH RBC QN AUTO: 32.9 PG (ref 26–34)
MCHC RBC AUTO-ENTMCNC: 31.1 G/DL (ref 32–36)
MCV RBC AUTO: 106 FL (ref 80–100)
NRBC BLD-RTO: 0 /100 WBCS (ref 0–0)
PLATELET # BLD AUTO: 142 X10*3/UL (ref 150–450)
POTASSIUM SERPL-SCNC: 4.1 MMOL/L (ref 3.5–5.3)
RBC # BLD AUTO: 3.46 X10*6/UL (ref 4.5–5.9)
SODIUM SERPL-SCNC: 137 MMOL/L (ref 136–145)
WBC # BLD AUTO: 9.2 X10*3/UL (ref 4.4–11.3)

## 2025-03-22 PROCEDURE — 97165 OT EVAL LOW COMPLEX 30 MIN: CPT | Mod: GO | Performed by: OCCUPATIONAL THERAPIST

## 2025-03-22 PROCEDURE — 85027 COMPLETE CBC AUTOMATED: CPT | Performed by: STUDENT IN AN ORGANIZED HEALTH CARE EDUCATION/TRAINING PROGRAM

## 2025-03-22 PROCEDURE — 72040 X-RAY EXAM NECK SPINE 2-3 VW: CPT | Performed by: RADIOLOGY

## 2025-03-22 PROCEDURE — 97161 PT EVAL LOW COMPLEX 20 MIN: CPT | Mod: GP

## 2025-03-22 PROCEDURE — 72040 X-RAY EXAM NECK SPINE 2-3 VW: CPT

## 2025-03-22 PROCEDURE — 2500000004 HC RX 250 GENERAL PHARMACY W/ HCPCS (ALT 636 FOR OP/ED): Performed by: STUDENT IN AN ORGANIZED HEALTH CARE EDUCATION/TRAINING PROGRAM

## 2025-03-22 PROCEDURE — 2500000001 HC RX 250 WO HCPCS SELF ADMINISTERED DRUGS (ALT 637 FOR MEDICARE OP): Performed by: STUDENT IN AN ORGANIZED HEALTH CARE EDUCATION/TRAINING PROGRAM

## 2025-03-22 PROCEDURE — 80048 BASIC METABOLIC PNL TOTAL CA: CPT | Performed by: STUDENT IN AN ORGANIZED HEALTH CARE EDUCATION/TRAINING PROGRAM

## 2025-03-22 PROCEDURE — 36415 COLL VENOUS BLD VENIPUNCTURE: CPT | Performed by: STUDENT IN AN ORGANIZED HEALTH CARE EDUCATION/TRAINING PROGRAM

## 2025-03-22 PROCEDURE — 96372 THER/PROPH/DIAG INJ SC/IM: CPT | Performed by: STUDENT IN AN ORGANIZED HEALTH CARE EDUCATION/TRAINING PROGRAM

## 2025-03-22 PROCEDURE — 2500000001 HC RX 250 WO HCPCS SELF ADMINISTERED DRUGS (ALT 637 FOR MEDICARE OP): Performed by: NURSE PRACTITIONER

## 2025-03-22 PROCEDURE — 7100000011 HC EXTENDED STAY RECOVERY HOURLY - NURSING UNIT

## 2025-03-22 RX ORDER — AMOXICILLIN 250 MG
2 CAPSULE ORAL 2 TIMES DAILY
Qty: 28 TABLET | Refills: 0 | Status: SHIPPED | OUTPATIENT
Start: 2025-03-22 | End: 2025-03-29

## 2025-03-22 RX ORDER — OXYCODONE HYDROCHLORIDE 5 MG/1
5 TABLET ORAL EVERY 6 HOURS PRN
Qty: 28 TABLET | Refills: 0 | Status: SHIPPED | OUTPATIENT
Start: 2025-03-22 | End: 2025-03-29

## 2025-03-22 RX ADMIN — ACETAMINOPHEN 650 MG: 325 TABLET ORAL at 12:55

## 2025-03-22 RX ADMIN — MORPHINE SULFATE 30 MG: 30 TABLET, EXTENDED RELEASE ORAL at 08:00

## 2025-03-22 RX ADMIN — ACETAMINOPHEN 650 MG: 325 TABLET ORAL at 06:42

## 2025-03-22 RX ADMIN — ASPIRIN 81 MG: 81 TABLET, COATED ORAL at 08:00

## 2025-03-22 RX ADMIN — ACETAMINOPHEN 650 MG: 325 TABLET ORAL at 01:08

## 2025-03-22 RX ADMIN — SENNOSIDES AND DOCUSATE SODIUM 2 TABLET: 50; 8.6 TABLET ORAL at 08:00

## 2025-03-22 RX ADMIN — HEPARIN SODIUM 5000 UNITS: 5000 INJECTION, SOLUTION INTRAVENOUS; SUBCUTANEOUS at 07:55

## 2025-03-22 RX ADMIN — HEPARIN SODIUM 5000 UNITS: 5000 INJECTION, SOLUTION INTRAVENOUS; SUBCUTANEOUS at 01:09

## 2025-03-22 RX ADMIN — PANTOPRAZOLE SODIUM 40 MG: 40 TABLET, DELAYED RELEASE ORAL at 06:42

## 2025-03-22 ASSESSMENT — COGNITIVE AND FUNCTIONAL STATUS - GENERAL
DRESSING REGULAR UPPER BODY CLOTHING: A LITTLE
DAILY ACTIVITIY SCORE: 19
DRESSING REGULAR UPPER BODY CLOTHING: A LITTLE
TOILETING: A LITTLE
MOVING TO AND FROM BED TO CHAIR: A LITTLE
MOBILITY SCORE: 18
STANDING UP FROM CHAIR USING ARMS: A LITTLE
TURNING FROM BACK TO SIDE WHILE IN FLAT BAD: A LITTLE
HELP NEEDED FOR BATHING: A LITTLE
TURNING FROM BACK TO SIDE WHILE IN FLAT BAD: A LITTLE
TOILETING: A LITTLE
DAILY ACTIVITIY SCORE: 19
DRESSING REGULAR LOWER BODY CLOTHING: A LITTLE
PERSONAL GROOMING: A LITTLE
HELP NEEDED FOR BATHING: A LITTLE
PERSONAL GROOMING: A LITTLE
MOVING FROM LYING ON BACK TO SITTING ON SIDE OF FLAT BED WITH BEDRAILS: A LITTLE
CLIMB 3 TO 5 STEPS WITH RAILING: A LITTLE
CLIMB 3 TO 5 STEPS WITH RAILING: A LITTLE
WALKING IN HOSPITAL ROOM: A LITTLE
MOVING TO AND FROM BED TO CHAIR: A LITTLE
DRESSING REGULAR LOWER BODY CLOTHING: A LITTLE
MOVING FROM LYING ON BACK TO SITTING ON SIDE OF FLAT BED WITH BEDRAILS: A LITTLE
MOBILITY SCORE: 18
STANDING UP FROM CHAIR USING ARMS: A LITTLE
WALKING IN HOSPITAL ROOM: A LITTLE

## 2025-03-22 ASSESSMENT — PAIN SCALES - GENERAL
PAINLEVEL_OUTOF10: 5 - MODERATE PAIN

## 2025-03-22 ASSESSMENT — PAIN - FUNCTIONAL ASSESSMENT
PAIN_FUNCTIONAL_ASSESSMENT: 0-10
PAIN_FUNCTIONAL_ASSESSMENT: 0-10

## 2025-03-22 NOTE — CARE PLAN
The patient's goals for the shift include      The clinical goals for the shift include pain control & to work with therapy    Over the shift, the patient did make progress toward the following goals. Barriers to progression include   Problem: Meds/Post-op Pain  Goal: Pain controlled to tolerate pain level  Outcome: Progressing  Goal: Tolerates prescribed medication  Outcome: Progressing   . Recommendations to address these barriers include   Problem: DVT/VTE Prevention/Activity  Goal: No decrease in circulation/sensation  Outcome: Progressing  Goal: Prevent skin breakdown  Outcome: Progressing  Goal: Return to preop oxygenation status  Outcome: Progressing  Goal: Tolerates optimal activity  Outcome: Progressing  Goal: Increase self care and/or family involvement in 24 hrs.  Outcome: Progressing     Problem: Wound care/infection prevention  Goal: No signs of infection in 24 hrs.  Outcome: Progressing  Goal: No unexpected bleeding from incision this shift  Outcome: Progressing   .

## 2025-03-22 NOTE — DISCHARGE SUMMARY
Discharge Diagnosis  Cervical myelopathy    Issues Requiring Follow-Up  Incision follow up    Test Results Pending At Discharge  Pending Labs       No current pending labs.            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   sennosides-docusate sodium 8.6-50 mg tablet; Commonly known as:   Quin-Colace; Take 2 tablets by mouth 2 times a day for 7 days.     CHANGE how you take these medications     metoprolol succinate  mg 24 hr tablet; Commonly known as:   Toprol-XL; What changed: Another medication with the same name was   removed. Continue taking this medication, and follow the directions you   see here.   rivaroxaban 20 mg tablet; Commonly known as: Xarelto; Take 1 tablet (20   mg) by mouth once daily. Do not fill before March 28, 2025.; Start taking   on: March 28, 2025; What changed: These instructions start on March 28, 2025. If you are unsure what to do until then, ask your doctor or other   care provider.     CONTINUE taking these medications     aspirin 81 mg EC tablet   atorvastatin 40 mg tablet; Commonly known as: Lipitor; TAKE 1 TABLET BY   MOUTH EVERY NIGHT AT BEDTIME   cyclobenzaprine 5 mg tablet; Commonly known as: Flexeril; Take 1 tablet   (5  mg) by mouth as needed at bedtime for muscle spasms.   Dexilant 60 mg DR capsule; Generic drug: dexlansoprazole   morphine CR 30 mg 12 hr tablet; Commonly known as: MS Contin   naloxone 4 mg/0.1 mL nasal spray; Commonly known as: Narcan; Administer   1 spray (4 mg) into affected nostril(s) if needed for opioid reversal. May   repeat every 2-3 minutes if needed, alternating nostrils, until medical   assistance becomes available.   tiZANidine 4 mg tablet; Commonly known as: Zanaflex; Take 1 tablet (4   mg) by mouth every 6 hours if needed for muscle spasms.   Ventolin HFA 90 mcg/actuation inhaler; Generic drug: albuterol     STOP taking these medications     chlorhexidine 0.12 % solution; Commonly known as: Peridex   traMADol 50 mg tablet; Commonly known as: Ultram       Outpatient Follow-Up  Future Appointments   Date Time Provider Department Center   4/11/2025  1:00 PM Raúl Kaur PA-C [id number] [place]   6/26/2025 11:30 AM Ramiro Perez MD PhD [id number] [place]   10/8/2025 11:00 AM Elaine Welch APRN-CNP [id number] [facility]   11/25/2025  2:00 PM SHIRLEY Munoz-CNP [id number] [facility]       Jose Thomas MD

## 2025-03-22 NOTE — NURSING NOTE
Pt discharge teaching is completed, pt voiced understanding. Iv removed. Vital signs are complete      Heading home

## 2025-03-22 NOTE — PROGRESS NOTES
"Occupational Therapy    Evaluation    Patient Name: Naveen Arauz \"Rick\"  MRN: [id number]  Today's Date: 3/22/2025  Time Calculation  Start Time: 1011  Stop Time: 1022  Time Calculation (min): 11 min  4126/4126-A  Eval only     Assessment  IP OT Assessment  Prognosis: Good  Medical Staff Made Aware: Yes  End of Session Communication: Bedside nurse  End of Session Patient Position: Up in chair  Patient presents with decline in ADLs, functional transfers, functional mobility and would benefit from OT during acute stay to improve functional independence and safety. Recommend low intensity OT to maximize functional independence and safety.     Plan:  Treatment Interventions: ADL retraining, Functional transfer training, Patient/family training, Equipment evaluation/education, Compensatory technique education  OT Frequency: Daily  OT Discharge Recommendations: Low intensity level of continued care  Equipment Recommended upon Discharge:  (shower chair, reacher)  OT - OK to Discharge: Yes from acute care OT services to the next level of care when cleared by medical team      Subjective     Current Problem:  1. Acquired spondylolisthesis of cervical vertebra  Full code    Place in outpatient/hospital ambulatory surgery    Full code    Place in outpatient/hospital ambulatory surgery    DISCONTINUED: povidone-iodine 5 % kit kit    CANCELED: NPO Diet Except: Sips with meds; Effective now    CANCELED: Height and weight    CANCELED: Insert and maintain peripheral IV    CANCELED: Saline lock IV    CANCELED: Type And Screen    CANCELED: Apply sequential compression device    CANCELED: Apply ARMANDO hose    CANCELED: NPO Diet Except: Sips with meds; Effective now    CANCELED: Height and weight    CANCELED: Insert and maintain peripheral IV    CANCELED: Saline lock IV    CANCELED: Apply sequential compression device    CANCELED: Apply ARMANDO hose      2. Central cord syndrome, subsequent encounter (Multi)  Full code    Place in " outpatient/hospital ambulatory surgery    Full code    Place in outpatient/hospital ambulatory surgery    DISCONTINUED: povidone-iodine 5 % kit kit    CANCELED: NPO Diet Except: Sips with meds; Effective now    CANCELED: Height and weight    CANCELED: Insert and maintain peripheral IV    CANCELED: Saline lock IV    CANCELED: Type And Screen    CANCELED: Apply sequential compression device    CANCELED: Apply ARMANDO hose    CANCELED: NPO Diet Except: Sips with meds; Effective now    CANCELED: Height and weight    CANCELED: Insert and maintain peripheral IV    CANCELED: Saline lock IV    CANCELED: Apply sequential compression device    CANCELED: Apply ARMANDO hose      3. H/O excision of lamina of cervical vertebra for decompression of spinal cord  Full code    Place in outpatient/hospital ambulatory surgery    Full code    Place in outpatient/hospital ambulatory surgery    DISCONTINUED: povidone-iodine 5 % kit kit    CANCELED: NPO Diet Except: Sips with meds; Effective now    CANCELED: Height and weight    CANCELED: Insert and maintain peripheral IV    CANCELED: Saline lock IV    CANCELED: Type And Screen    CANCELED: Apply sequential compression device    CANCELED: Apply ARMANDO hose    CANCELED: NPO Diet Except: Sips with meds; Effective now    CANCELED: Height and weight    CANCELED: Insert and maintain peripheral IV    CANCELED: Saline lock IV    CANCELED: Apply sequential compression device    CANCELED: Apply ARMANDO hose          General:  General  Reason for Referral: recent cervical spine surgery  Referred By: Ramiro Perez MD  Past Medical History Relevant to Rehab: Admitted 3/21/2025 after having the following completed:    C5-6 anterior cervical discectomy fusion, with cervical traction       completed by Dr Perez and Dr Cuenca. Per EMR:  acquired spondylolisthesis of cervical vertebra, central cord syndrome, H/O excision of lamina of cervical vertebra for decompression of spinal cord scheduled for C5-6 anterior  "cervical discectomy fusion, with cervical tracture on 3/21/25. He recently injured his neck after a mechanical fall down 2 steps. He had emergent surgery for a large epidural hematoma in January of 2024. He has been wearing a neck brace since January. States pain is constant.  Co-Treatment: PT  Co-Treatment Reason: for safety  Prior to Session Communication: Bedside nurse  Patient Position Received: Up in chair, Alarm off, not on at start of session  Preferred Learning Style: verbal  General Comment: Patient seated in bedside chair and agreeable to participate in OT evaluation  Patient reported having difficulty swallowing breakfast and stated \"I get my throat stretched every so often\".  Notified RN who is aware.    Lines: REYNALDO drain     Precautions:  Medical Precautions: Fall precautions  Post-Surgical Precautions: Spinal precautions    Vital Signs:   Spo2: 95% room air    Pain:  Pain Assessment  Pain Assessment: 0-10  0-10 (Numeric) Pain Score: 5 - Moderate pain  Pain Type: Surgical pain  Pain Location: Neck  Pain Interventions: Rest    Objective   Cognition:  Overall Cognitive Status: Within Functional Limits    Home Living:  Home Living Comments: Lives at home with spouse with ramp entry.  Has WIS with no DME     Prior Function:  Prior Function Comments: Was independent with all ADLs, IADLs and functional mobility tasks without AD.    ADL:  LE Dressing Assistance: Minimal (don underwear and pants)  ADL Comments: Educated patient on safety and comp strategies for LB dressing. Patient verbalized understanding and able to provide return demonstartion of understanding    Activity Tolerance:  Endurance: Endurance does not limit participation in activity    Bed Mobility/Transfers:      Transfers  Transfer:  (SBA sit <>stand)    Ambulation/Gait Training:  Functional Mobility  Functional Mobility Performed:  (CGA without AD functional mobility tasks)    Extremities: RUE   RUE : Within Functional Limits and LUE   LUE: " Within Functional Limits    Outcome Measures: [facility] Daily Activity  Putting on and taking off regular lower body clothing: A little  Bathing (including washing, rinsing, drying): A little  Putting on and taking off regular upper body clothing: A little  Toileting, which includes using toilet, bedpan or urinal: A little  Taking care of personal grooming such as brushing teeth: A little  Eating Meals: None  Daily Activity - Total Score: 19      EDUCATION:  Education  Individual(s) Educated: Patient  Education Provided: Fall precautons (spine precautions, safety)  Patient Response to Education: Patient/Caregiver Verbalized Understanding of Information      Goals:   Encounter Problems       Encounter Problems (Active)       Dressing Upper Extremities       STG - Patient will dress upper body independently  (Progressing)       Start:  03/22/25    Expected End:  04/05/25               Dressings Lower Extremities       STG - Patient will complete lower body dressing independently with AE and comp strategies  (Progressing)       Start:  03/22/25    Expected End:  04/05/25               Functional Balance       STG-Patient will be independent with assistive device dynamic stand task >5 minutes for ADL completion   (Progressing)       Start:  03/22/25    Expected End:  04/05/25               Functional Mobility       STG-Patient will be independent with assistive device functional mobility tasks   (Progressing)       Start:  03/22/25    Expected End:  04/05/25               OT Transfers       STG-Patient will be independent with functional transfers demonstrating good safety   (Progressing)       Start:  03/22/25    Expected End:  04/05/25               Safety       STG-Patient will independently verbalize spine precautions with all functional tasks   (Progressing)       Start:  03/22/25    Expected End:  04/05/25

## 2025-03-22 NOTE — PROGRESS NOTES
"Physical Therapy    Physical Therapy Evaluation    Patient Name: Naveen Arauz \"Rick\"  MRN: [id number]  Department: [facility] 4 OBS  Room: [address] [place], [place] [address]A  Today's Date: 3/22/2025   Time Calculation  Start Time: 1010  Stop Time: 1022  Time Calculation (min): 12 min    Assessment/Plan   PT Assessment  Rehab Prognosis: Good  End of Session Communication: Bedside nurse  Assessment Comment: Pt demonstrates ability to complete OOB mobility with CGA. Some unsteadiness noted with two minor losses of balance which pt was able to self-correct. Educated pt on use of AD at home as needed to prevent falls. Recommend low intensity PT.  End of Session Patient Position: Up in chair  IP OR SWING BED PT PLAN  Inpatient or Swing Bed: Inpatient  PT Plan  PT Plan: Ongoing PT  PT Frequency: Daily  PT Recommended Transfer Status: Stand by assist  PT - OK to Discharge: Yes (when medically cleared)    Subjective   General Visit Information:  General  Reason for Referral: recent cervical spine surgery  Referred By: Ramiro Perez MD  Past Medical History Relevant to Rehab: Admitted 3/21/2025 after having the following completed: C5-6 anterior cervical discectomy fusion, with cervical traction completed by Dr Perez and Dr Cuenca. Per EMR: acquired spondylolisthesis of cervical vertebra, central cord syndrome, H/O excision of lamina of cervical vertebra for decompression of spinal cord scheduled for C5-6 anterior cervical discectomy fusion, with cervical tracture on 3/21/25. He recently injured his neck after a mechanical fall down 2 steps. He had emergent surgery for a large epidural hematoma in January of 2024. He has been wearing a neck brace since January. States pain is constant.  Co-Treatment: OT  Co-Treatment Reason: for safety  Prior to Session Communication: Bedside nurse  Patient Position Received: Up in chair  Preferred Learning Style: verbal  General Comment: pt agreeable to PT evaluation  Home Living:  Home Living  Home Living Comments: " Lives at home with spouse with ramp entry. Has WIS with no DME  Prior Level of Function:  Prior Function Per Pt/Caregiver Report  Prior Function Comments: Was independent with all ADLs, IADLs and functional mobility tasks without AD.  Precautions:  Precautions  Medical Precautions: Fall precautions  Post-Surgical Precautions: Spinal precautions             Objective   Pain:  Pain Assessment  Pain Assessment: 0-10  0-10 (Numeric) Pain Score: 5 - Moderate pain  Pain Type: Surgical pain  Pain Location: Neck  Pain Interventions: Repositioned  Cognition:  Cognition  Orientation Level: Oriented X4    General Assessments:  Activity Tolerance  Endurance: Endurance does not limit participation in activity       Functional Assessments:  Transfers  Transfer:  (sit<>stand SBA)    Ambulation/Gait Training  Ambulation/Gait Training Performed:  (amb 100' with CGA)  Extremity/Trunk Assessments:  RLE   RLE :  (grossly 4/5)  LLE   LLE :  (grossly 4/5)  Outcome Measures:  [facility] Basic Mobility  Turning from your back to your side while in a flat bed without using bedrails: A little  Moving from lying on your back to sitting on the side of a flat bed without using bedrails: A little  Moving to and from bed to chair (including a wheelchair): A little  Standing up from a chair using your arms (e.g. wheelchair or bedside chair): A little  To walk in hospital room: A little  Climbing 3-5 steps with railing: A little  Basic Mobility - Total Score: 18    Encounter Problems       Encounter Problems (Active)       PT Problem       Pt will demonstrate ability to complete bed mobility with independence        Start:  03/22/25    Expected End:  04/05/25            Pt will demonstrate ability to complete all transfers with independence       Start:  03/22/25    Expected End:  04/05/25            Pt will demonstrate ability to ambulate 250' with independence       Start:  03/22/25    Expected End:  04/05/25                   Education  Documentation  Home Exercise Program, taught by Enriqueta Tomlin, PT at 3/22/2025  2:12 PM.  Learner: Patient  Readiness: Acceptance  Method: Explanation  Response: Needs Reinforcement    Precautions, taught by Enriqueta Tomlin, PT at 3/22/2025  2:12 PM.  Learner: Patient  Readiness: Acceptance  Method: Explanation  Response: Needs Reinforcement    Body Mechanics, taught by Enriqueta Tomlin, PT at 3/22/2025  2:12 PM.  Learner: Patient  Readiness: Acceptance  Method: Explanation  Response: Needs Reinforcement    Mobility Training, taught by Enriqueta Tomlin, PT at 3/22/2025  2:12 PM.  Learner: Patient  Readiness: Acceptance  Method: Explanation  Response: Needs Reinforcement    Education Comments  No comments found.

## 2025-03-22 NOTE — PROGRESS NOTES
"Naveen Arauz \"Rick\" is a 80 y.o. male on day 0 of admission presenting with Cervical myelopathy.    Subjective   Doing well, had some swallowing trouble this AM that is worse than his baseline swallowing at home; wants to try another meal. Feels his left arm is stronger since surgery.       Objective     Ox3  BUE D/B/T5 HG4+ IO 3  BLE HF/KE/DF/PF/EHL 5  SILT b/l  Incision c/d/I with glue      Last Recorded Vitals  Blood pressure 131/78, pulse 71, temperature 36.5 °C (97.7 °F), temperature source Temporal, resp. rate 20, height 1.829 m (6'), weight 72.6 kg (160 lb), SpO2 96%.  Intake/Output last 3 Shifts:  I/O last 3 completed shifts:  In: 923.2 (12.7 mL/kg) [I.V.:23.2 (0.3 mL/kg); IV Piggyback:900]  Out: 275 (3.8 mL/kg) [Urine:250 (0.1 mL/kg/hr); Drains:15; Blood:10]  Weight: 72.6 kg       Assessment/Plan   Assessment & Plan  Cervical myelopathy    Acquired spondylolisthesis of cervical vertebra    Central cord syndrome (Multi)    H/O excision of lamina of cervical vertebra for decompression of spinal cord    Patient is a 80 year old male w/ h/o HTN, HLD, COPD, Afib (on Xarelto), CAD s/p PCI mid RCA (2015, on ASA), gout, GERD, BPH, C4-5 ACDF (45 years ago), 1/2025 p/w fall w/ cervical EDH s/p C4-7 lami, p/w neck pain, 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

## 2025-03-31 DIAGNOSIS — G95.9 CERVICAL MYELOPATHY: ICD-10-CM

## 2025-03-31 LAB
ATRIAL RATE: 74 BPM
P AXIS: 80 DEGREES
P OFFSET: 155 MS
P ONSET: 102 MS
PR INTERVAL: 238 MS
Q ONSET: 221 MS
QRS COUNT: 12 BEATS
QRS DURATION: 82 MS
QT INTERVAL: 404 MS
QTC CALCULATION(BAZETT): 448 MS
QTC FREDERICIA: 433 MS
R AXIS: 37 DEGREES
T AXIS: 67 DEGREES
T OFFSET: 423 MS
VENTRICULAR RATE: 74 BPM

## 2025-03-31 RX ORDER — OXYCODONE HYDROCHLORIDE 5 MG/1
5 TABLET ORAL EVERY 6 HOURS PRN
Qty: 28 TABLET | Refills: 0 | Status: SHIPPED | OUTPATIENT
Start: 2025-03-31 | End: 2025-04-07

## 2025-03-31 NOTE — PROGRESS NOTES
I have personally reviewed the OARRS report for Naveen Arauz. I have considered the risks of abuse, dependence, addiction and  diversion. I believe that it is clinically appropriate for Naveen Arauz  to be prescribed this medication.      Patient with recent history of major reconstructive surgery necessitating narcotic medications at higher doses during the acute post-operative period.     Will continue with close monitoring and short course refills.     The above clinical summary has been dictated with voice recognition software. It has not been proofread for grammatical errors, typographical mistakes, or other semantic inconsistencies.    Thank you for visiting our office today. It was our pleasure to take part in your healthcare.     Do not hesitate to call with any questions regarding your plan of care after leaving at [phone] M-F 8am-4pm.     To clinicians, thank you very much for this kind referral. It is a privilege to partner with you in the care of your patients. My office would be delighted to assist you with any further consultations or with questions regarding the plan of care outlined. Do not hesitate to call the office or contact me directly.       Sincerely,      LUIS Hicks, PA-C  Associate Physician Assistant, Neurosurgery  Clinical   [facility] [place] [address]    Phone: [phone]  Fax: [phone]

## 2025-04-03 ENCOUNTER — APPOINTMENT (OUTPATIENT)
Facility: CLINIC | Age: 80
End: 2025-04-03
Payer: MEDICARE

## 2025-04-11 ENCOUNTER — OFFICE VISIT (OUTPATIENT)
Facility: CLINIC | Age: 80
End: 2025-04-11
Payer: MEDICARE

## 2025-04-11 VITALS
RESPIRATION RATE: 16 BRPM | HEIGHT: 72 IN | TEMPERATURE: 96.9 F | DIASTOLIC BLOOD PRESSURE: 70 MMHG | HEART RATE: 68 BPM | WEIGHT: 160 LBS | BODY MASS INDEX: 21.67 KG/M2 | SYSTOLIC BLOOD PRESSURE: 120 MMHG

## 2025-04-11 DIAGNOSIS — S12.591D OTHER CLOSED NONDISPLACED FRACTURE OF SIXTH CERVICAL VERTEBRA WITH ROUTINE HEALING, SUBSEQUENT ENCOUNTER: Primary | ICD-10-CM

## 2025-04-11 DIAGNOSIS — Z98.1 STATUS POST CERVICAL SPINAL FUSION: ICD-10-CM

## 2025-04-11 PROCEDURE — 3078F DIAST BP <80 MM HG: CPT | Performed by: PHYSICIAN ASSISTANT

## 2025-04-11 PROCEDURE — 3074F SYST BP LT 130 MM HG: CPT | Performed by: PHYSICIAN ASSISTANT

## 2025-04-11 PROCEDURE — 1157F ADVNC CARE PLAN IN RCRD: CPT | Performed by: PHYSICIAN ASSISTANT

## 2025-04-11 PROCEDURE — 1125F AMNT PAIN NOTED PAIN PRSNT: CPT | Performed by: PHYSICIAN ASSISTANT

## 2025-04-11 PROCEDURE — 99211 OFF/OP EST MAY X REQ PHY/QHP: CPT | Performed by: PHYSICIAN ASSISTANT

## 2025-04-11 PROCEDURE — 1036F TOBACCO NON-USER: CPT | Performed by: PHYSICIAN ASSISTANT

## 2025-04-11 PROCEDURE — 1159F MED LIST DOCD IN RCRD: CPT | Performed by: PHYSICIAN ASSISTANT

## 2025-04-11 ASSESSMENT — PATIENT HEALTH QUESTIONNAIRE - PHQ9
SUM OF ALL RESPONSES TO PHQ9 QUESTIONS 1 AND 2: 2
1. LITTLE INTEREST OR PLEASURE IN DOING THINGS: SEVERAL DAYS
2. FEELING DOWN, DEPRESSED OR HOPELESS: SEVERAL DAYS

## 2025-04-11 ASSESSMENT — PAIN SCALES - GENERAL: PAINLEVEL_OUTOF10: 4

## 2025-04-11 NOTE — PROGRESS NOTES
[facility]  Department of Neurological Surgery  Post Operative Patient Visit      History of Present Illness:  Naveen Arauz is a 80 y.o. year old male who presents post C5-6 ACDF by Dr. Perez on March 21, 2025 following emergency laminectomy for epidural hematoma on January 5, 2025.  Patient has improvement continuing in upper extremities bilaterally though has sensory deficit in left upper extremity continued.  Balance and coordination are also gradually improving.  Inspection of incision shows well-approximated, nonerythemic, nonedematous surgical incision with early fresh tissue spanning.  Will place an order for x-rays to be taken just prior to next follow-up with surgeon.        14/14 systems reviewed and negative other than what is listed in the history of present illness    Patient Active Problem List   Diagnosis    Paroxysmal atrial fibrillation (Multi)    Coronary artery disease    Disorder of rotator cuff    Impingement syndrome of shoulder region    Disorder of tendon of biceps    Essential hypertension    Gastroesophageal reflux disease    Hemangioma of skin and subcutaneous tissue    History of coronary artery stent placement    Lyme disease    Melanocytic nevi of trunk    Melanocytic nevi of unspecified lower limb, including hip    Melanocytic nevi of unspecified upper limb, including shoulder    Dermatitis, unspecified    Other hypertrophic disorders of the skin    Other melanin hyperpigmentation    Actinic keratosis    Other seborrheic keratosis    Abnormal compliance of bladder    BPH (benign prostatic hyperplasia)    Urethral stricture    Fall    Closed head injury    Closed nondisplaced fracture of sixth cervical vertebra    RSV (respiratory syncytial virus infection)    Pneumonia of both lower lobes due to infectious organism    Fracture of unspecified parts of lumbosacral spine and pelvis, initial encounter for closed fracture (Multi)    Epidural hematoma (Multi)     Acquired spondylolisthesis of cervical vertebra    Central cord syndrome (Multi)    H/O excision of lamina of cervical vertebra for decompression of spinal cord    Cervical myelopathy     Past Medical History:   Diagnosis Date    A-fib (Multi)     Arrhythmia     Blood clot in spinal cord artery (Multi)     Coronary artery disease     Gout     Hyperlipidemia     Hypertension     Transient cerebral ischemic attack, unspecified     TIA (transient ischemic attack)     Past Surgical History:   Procedure Laterality Date    ANTERIOR CERVICAL DISCECTOMY W/ FUSION  2025    C5-6 anterior cervical discectomy fusion, with cervical traction (Neck)    CARDIAC CATHETERIZATION N/A 2023    Procedure: Left Heart Cath;  Surgeon: Manuel Blackwell MD;  Location: [facility];  Service: Cardiovascular;  Laterality: N/A;    MR HEAD ANGIO WO IV CONTRAST  2018    MR HEAD ANGIO WO IV CONTRAST 2018 [facility]    MR NECK ANGIO WO IV CONTRAST  2018    MR NECK ANGIO WO IV CONTRAST 2018 [facility]    OTHER SURGICAL HISTORY      cervical fusion    OTHER SURGICAL HISTORY Bilateral     Knee surgery    OTHER SURGICAL HISTORY      Cardiac catheterization with stent placement    OTHER SURGICAL HISTORY      Stomach surgery    OTHER SURGICAL HISTORY      Back surgery     Social History     Tobacco Use    Smoking status: Former     Current packs/day: 0.00     Types: Cigarettes     Quit date:      Years since quittin.3    Smokeless tobacco: Never   Substance Use Topics    Alcohol use: Yes     Comment: Occasional     family history includes Heart attack in his father, mother, and another family member.    Current Outpatient Medications:     albuterol (Ventolin HFA) 90 mcg/actuation inhaler, Inhale 2 puffs 4 times a day as needed for wheezing or shortness of breath., Disp: , Rfl:     aspirin 81 mg EC tablet, Take 1 tablet (81 mg) by mouth once daily., Disp: , Rfl:     atorvastatin  (Lipitor) 40 mg tablet, TAKE 1 TABLET BY MOUTH EVERY NIGHT AT BEDTIME, Disp: 90 tablet, Rfl: 1    cyclobenzaprine (Flexeril) 5 mg tablet, Take 1 tablet (5 mg) by mouth as needed at bedtime for muscle spasms., Disp: 90 tablet, Rfl: 0    dexlansoprazole (Dexilant) 60 mg DR capsule, Take 1 capsule (60 mg) by mouth once daily., Disp: , Rfl:     metoprolol succinate XL (Toprol-XL) 100 mg 24 hr tablet, Take 0.5 tablets (50 mg) by mouth once daily. Do not crush or chew., Disp: , Rfl:     morphine CR (MS Contin) 30 mg 12 hr tablet, TAKE ONE TABLET BY MOUTH EVERY 8 HOURS FOR PAIN -TAKE 1 TABLET TWICE A DAY SCHEDULED AND MAY TAKE ONE EXTRA DOSE OF MORPHINE SA IN THE AFTERNOON IF NEEDED FOR PAIN -TAKE 1 TABLET TWICE A DAY SCHEDULED AND MAY TAKE ONE EXTRA DOSE OF MORPHINE SA IN THE AFTERNOON IF NEEDED FOR PAIN, Disp: , Rfl:     naloxone (Narcan) 4 mg/0.1 mL nasal spray, Administer 1 spray (4 mg) into affected nostril(s) if needed for opioid reversal. May repeat every 2-3 minutes if needed, alternating nostrils, until medical assistance becomes available., Disp: 2 each, Rfl: 0    rivaroxaban (Xarelto) 20 mg tablet, Take 1 tablet (20 mg) by mouth once daily. Do not fill before March 28, 2025., Disp: 30 tablet, Rfl: 0    tiZANidine (Zanaflex) 4 mg tablet, Take 1 tablet (4 mg) by mouth every 6 hours if needed for muscle spasms., Disp: 60 tablet, Rfl: 0  Allergies   Allergen Reactions    Nsaids (Non-Steroidal Anti-Inflammatory Drug) GI Upset and Nausea/vomiting     No NSAIDs due to stomach surgeries         The above clinical summary has been dictated with voice recognition software. It has not been proofread for grammatical errors, typographical mistakes, or other semantic inconsistencies.    Thank you for visiting our office today. It was our pleasure to take part in your healthcare.     Do not hesitate to call with any questions regarding your plan of care after leaving at [phone] M-F 8am-4pm.     To clinicians, thank you  very much for this kind referral. It is a privilege to partner with you in the care of your patients. My office would be delighted to assist you with any further consultations or with questions regarding the plan of care outlined. Do not hesitate to call the office or contact me directly.       Sincerely,      LUIS Hicks, PA-C  Associate Physician Assistant, Neurosurgery  Clinical   [facility] [place] [address]    Phone: [phone]  Fax: [phone]

## 2025-04-23 ENCOUNTER — HOSPITAL ENCOUNTER (OUTPATIENT)
Dept: RADIOLOGY | Facility: HOSPITAL | Age: 80
Discharge: HOME | End: 2025-04-23
Payer: MEDICARE

## 2025-04-23 DIAGNOSIS — R13.10 DYSPHAGIA, UNSPECIFIED: ICD-10-CM

## 2025-04-23 DIAGNOSIS — R05.3 CHRONIC COUGH: ICD-10-CM

## 2025-04-23 DIAGNOSIS — S06.4XAA EPIDURAL HEMATOMA (MULTI): ICD-10-CM

## 2025-04-23 PROCEDURE — 72040 X-RAY EXAM NECK SPINE 2-3 VW: CPT

## 2025-04-23 PROCEDURE — 71046 X-RAY EXAM CHEST 2 VIEWS: CPT

## 2025-05-02 ENCOUNTER — HOSPITAL ENCOUNTER (OUTPATIENT)
Dept: RADIOLOGY | Facility: HOSPITAL | Age: 80
Discharge: HOME | End: 2025-05-02
Payer: MEDICARE

## 2025-05-02 DIAGNOSIS — J18.9 PNEUMONIA, UNSPECIFIED ORGANISM: ICD-10-CM

## 2025-05-02 PROCEDURE — 71046 X-RAY EXAM CHEST 2 VIEWS: CPT

## 2025-05-06 ENCOUNTER — APPOINTMENT (OUTPATIENT)
Dept: RADIOLOGY | Facility: HOSPITAL | Age: 80
End: 2025-05-06
Payer: MEDICARE

## 2025-05-06 ENCOUNTER — APPOINTMENT (OUTPATIENT)
Dept: CARDIOLOGY | Facility: HOSPITAL | Age: 80
End: 2025-05-06
Payer: MEDICARE

## 2025-05-06 ENCOUNTER — HOSPITAL ENCOUNTER (EMERGENCY)
Facility: HOSPITAL | Age: 80
Discharge: ANOTHER HOSPITAL | End: 2025-05-06
Attending: FAMILY MEDICINE
Payer: MEDICARE

## 2025-05-06 VITALS
SYSTOLIC BLOOD PRESSURE: 130 MMHG | OXYGEN SATURATION: 96 % | TEMPERATURE: 98.1 F | WEIGHT: 149.03 LBS | DIASTOLIC BLOOD PRESSURE: 81 MMHG | HEIGHT: 72 IN | HEART RATE: 78 BPM | RESPIRATION RATE: 16 BRPM | BODY MASS INDEX: 20.19 KG/M2

## 2025-05-06 DIAGNOSIS — I48.91 HISTORY OF CARDIAC ABLATION FOR ATRIAL FIBRILLATION: ICD-10-CM

## 2025-05-06 DIAGNOSIS — Z98.890 HISTORY OF CARDIAC ABLATION FOR ATRIAL FIBRILLATION: ICD-10-CM

## 2025-05-06 DIAGNOSIS — R68.89 SUSPECTED LUNG CANCER: ICD-10-CM

## 2025-05-06 DIAGNOSIS — D68.9 COAGULOPATHY (MULTI): ICD-10-CM

## 2025-05-06 DIAGNOSIS — I71.11 RUPTURED ANEURYSM OF ASCENDING AORTA: ICD-10-CM

## 2025-05-06 DIAGNOSIS — J18.9 MULTIFOCAL PNEUMONIA: Primary | ICD-10-CM

## 2025-05-06 DIAGNOSIS — Z79.01 ANTICOAGULATED ON XARELTO: ICD-10-CM

## 2025-05-06 LAB
ALBUMIN SERPL BCP-MCNC: 3.4 G/DL (ref 3.4–5)
ALP SERPL-CCNC: 97 U/L (ref 33–136)
ALT SERPL W P-5'-P-CCNC: 9 U/L (ref 10–52)
ANION GAP SERPL CALC-SCNC: 9 MMOL/L (ref 10–20)
APPEARANCE UR: CLEAR
APTT PPP: 56 SECONDS (ref 26–36)
AST SERPL W P-5'-P-CCNC: 16 U/L (ref 9–39)
BASOPHILS # BLD AUTO: 0.04 X10*3/UL (ref 0–0.1)
BASOPHILS NFR BLD AUTO: 0.5 %
BILIRUB SERPL-MCNC: 0.5 MG/DL (ref 0–1.2)
BILIRUB UR STRIP.AUTO-MCNC: NEGATIVE MG/DL
BUN SERPL-MCNC: 18 MG/DL (ref 6–23)
CALCIUM SERPL-MCNC: 8.5 MG/DL (ref 8.6–10.3)
CARDIAC TROPONIN I PNL SERPL HS: 13 NG/L (ref 0–20)
CARDIAC TROPONIN I PNL SERPL HS: 13 NG/L (ref 0–20)
CHLORIDE SERPL-SCNC: 105 MMOL/L (ref 98–107)
CO2 SERPL-SCNC: 23 MMOL/L (ref 21–32)
COLOR UR: YELLOW
CREAT SERPL-MCNC: 0.94 MG/DL (ref 0.5–1.3)
EGFRCR SERPLBLD CKD-EPI 2021: 83 ML/MIN/1.73M*2
EOSINOPHIL # BLD AUTO: 0.09 X10*3/UL (ref 0–0.4)
EOSINOPHIL NFR BLD AUTO: 1.1 %
ERYTHROCYTE [DISTWIDTH] IN BLOOD BY AUTOMATED COUNT: 13.8 % (ref 11.5–14.5)
GLUCOSE SERPL-MCNC: 94 MG/DL (ref 74–99)
GLUCOSE UR STRIP.AUTO-MCNC: NEGATIVE MG/DL
HCT VFR BLD AUTO: 41.9 % (ref 41–52)
HGB BLD-MCNC: 13.3 G/DL (ref 13.5–17.5)
HOLD SPECIMEN: 293
HOLD SPECIMEN: 293
HOLD SPECIMEN: NORMAL
IMM GRANULOCYTES # BLD AUTO: 0.02 X10*3/UL (ref 0–0.5)
IMM GRANULOCYTES NFR BLD AUTO: 0.2 % (ref 0–0.9)
INR PPP: >10 (ref 0.9–1.1)
INR PPP: ABNORMAL
KETONES UR STRIP.AUTO-MCNC: ABNORMAL MG/DL
LACTATE SERPL-SCNC: 1.2 MMOL/L (ref 0.4–2)
LEUKOCYTE ESTERASE UR QL STRIP.AUTO: NEGATIVE
LIPASE SERPL-CCNC: 7 U/L (ref 9–82)
LYMPHOCYTES # BLD AUTO: 1.58 X10*3/UL (ref 0.8–3)
LYMPHOCYTES NFR BLD AUTO: 18.6 %
MAGNESIUM SERPL-MCNC: 1.73 MG/DL (ref 1.6–2.4)
MCH RBC QN AUTO: 31.8 PG (ref 26–34)
MCHC RBC AUTO-ENTMCNC: 31.7 G/DL (ref 32–36)
MCV RBC AUTO: 100 FL (ref 80–100)
MONOCYTES # BLD AUTO: 0.68 X10*3/UL (ref 0.05–0.8)
MONOCYTES NFR BLD AUTO: 8 %
NEUTROPHILS # BLD AUTO: 6.09 X10*3/UL (ref 1.6–5.5)
NEUTROPHILS NFR BLD AUTO: 71.6 %
NITRITE UR QL STRIP.AUTO: NEGATIVE
NRBC BLD-RTO: 0 /100 WBCS (ref 0–0)
PH UR STRIP.AUTO: 5 [PH]
PLATELET # BLD AUTO: 284 X10*3/UL (ref 150–450)
POTASSIUM SERPL-SCNC: 3.7 MMOL/L (ref 3.5–5.3)
PROT SERPL-MCNC: 6.2 G/DL (ref 6.4–8.2)
PROT UR STRIP.AUTO-MCNC: NEGATIVE MG/DL
PROTHROMBIN TIME: >100 SECONDS (ref 9.8–12.4)
PROTHROMBIN TIME: >100 SECONDS (ref 9.8–12.4)
RBC # BLD AUTO: 4.18 X10*6/UL (ref 4.5–5.9)
RBC # UR STRIP.AUTO: NEGATIVE MG/DL
SODIUM SERPL-SCNC: 133 MMOL/L (ref 136–145)
SP GR UR STRIP.AUTO: 1.02
UROBILINOGEN UR STRIP.AUTO-MCNC: <2 MG/DL
WBC # BLD AUTO: 8.5 X10*3/UL (ref 4.4–11.3)

## 2025-05-06 PROCEDURE — 83605 ASSAY OF LACTIC ACID: CPT | Performed by: FAMILY MEDICINE

## 2025-05-06 PROCEDURE — 96367 TX/PROPH/DG ADDL SEQ IV INF: CPT

## 2025-05-06 PROCEDURE — 85610 PROTHROMBIN TIME: CPT | Performed by: FAMILY MEDICINE

## 2025-05-06 PROCEDURE — 36415 COLL VENOUS BLD VENIPUNCTURE: CPT | Performed by: FAMILY MEDICINE

## 2025-05-06 PROCEDURE — 87449 NOS EACH ORGANISM AG IA: CPT | Mod: CONLAB

## 2025-05-06 PROCEDURE — 83735 ASSAY OF MAGNESIUM: CPT | Performed by: FAMILY MEDICINE

## 2025-05-06 PROCEDURE — 80053 COMPREHEN METABOLIC PANEL: CPT | Performed by: FAMILY MEDICINE

## 2025-05-06 PROCEDURE — 84484 ASSAY OF TROPONIN QUANT: CPT | Performed by: FAMILY MEDICINE

## 2025-05-06 PROCEDURE — 96365 THER/PROPH/DIAG IV INF INIT: CPT

## 2025-05-06 PROCEDURE — 96361 HYDRATE IV INFUSION ADD-ON: CPT

## 2025-05-06 PROCEDURE — 87899 AGENT NOS ASSAY W/OPTIC: CPT | Mod: CONLAB

## 2025-05-06 PROCEDURE — 74176 CT ABD & PELVIS W/O CONTRAST: CPT | Mod: FOREIGN READ | Performed by: RADIOLOGY

## 2025-05-06 PROCEDURE — 81003 URINALYSIS AUTO W/O SCOPE: CPT | Performed by: FAMILY MEDICINE

## 2025-05-06 PROCEDURE — 99285 EMERGENCY DEPT VISIT HI MDM: CPT | Mod: 25 | Performed by: FAMILY MEDICINE

## 2025-05-06 PROCEDURE — 93005 ELECTROCARDIOGRAM TRACING: CPT

## 2025-05-06 PROCEDURE — 83690 ASSAY OF LIPASE: CPT | Performed by: FAMILY MEDICINE

## 2025-05-06 PROCEDURE — 71250 CT THORAX DX C-: CPT | Mod: FOREIGN READ | Performed by: RADIOLOGY

## 2025-05-06 PROCEDURE — 84484 ASSAY OF TROPONIN QUANT: CPT | Mod: 91 | Performed by: FAMILY MEDICINE

## 2025-05-06 PROCEDURE — 85730 THROMBOPLASTIN TIME PARTIAL: CPT | Performed by: FAMILY MEDICINE

## 2025-05-06 PROCEDURE — 2500000004 HC RX 250 GENERAL PHARMACY W/ HCPCS (ALT 636 FOR OP/ED): Mod: JZ | Performed by: FAMILY MEDICINE

## 2025-05-06 PROCEDURE — 71250 CT THORAX DX C-: CPT

## 2025-05-06 PROCEDURE — 93005 ELECTROCARDIOGRAM TRACING: CPT | Mod: 59

## 2025-05-06 PROCEDURE — 85025 COMPLETE CBC W/AUTO DIFF WBC: CPT | Performed by: FAMILY MEDICINE

## 2025-05-06 PROCEDURE — 87040 BLOOD CULTURE FOR BACTERIA: CPT | Mod: CONLAB | Performed by: FAMILY MEDICINE

## 2025-05-06 PROCEDURE — 2500000004 HC RX 250 GENERAL PHARMACY W/ HCPCS (ALT 636 FOR OP/ED)

## 2025-05-06 RX ORDER — AZITHROMYCIN MONOHYDRATE 500 MG/5ML
INJECTION, POWDER, LYOPHILIZED, FOR SOLUTION INTRAVENOUS
Status: COMPLETED
Start: 2025-05-06 | End: 2025-05-06

## 2025-05-06 RX ORDER — PHYTONADIONE 10 MG/ML
INJECTION, EMULSION INTRAMUSCULAR; INTRAVENOUS; SUBCUTANEOUS
Status: COMPLETED
Start: 2025-05-06 | End: 2025-05-06

## 2025-05-06 RX ORDER — CEFTRIAXONE 1 G/50ML
1 INJECTION, SOLUTION INTRAVENOUS ONCE
Status: COMPLETED | OUTPATIENT
Start: 2025-05-06 | End: 2025-05-06

## 2025-05-06 RX ADMIN — CEFTRIAXONE 1 G: 1 INJECTION, SOLUTION INTRAVENOUS at 15:51

## 2025-05-06 RX ADMIN — AZITHROMYCIN MONOHYDRATE 500 MG: 500 INJECTION, POWDER, LYOPHILIZED, FOR SOLUTION INTRAVENOUS at 15:51

## 2025-05-06 RX ADMIN — SODIUM CHLORIDE 1000 ML: 9 INJECTION, SOLUTION INTRAVENOUS at 12:00

## 2025-05-06 RX ADMIN — PHYTONADIONE 5 MG: 10 INJECTION, EMULSION INTRAMUSCULAR; INTRAVENOUS; SUBCUTANEOUS at 19:04

## 2025-05-06 ASSESSMENT — COLUMBIA-SUICIDE SEVERITY RATING SCALE - C-SSRS
6. HAVE YOU EVER DONE ANYTHING, STARTED TO DO ANYTHING, OR PREPARED TO DO ANYTHING TO END YOUR LIFE?: NO
1. IN THE PAST MONTH, HAVE YOU WISHED YOU WERE DEAD OR WISHED YOU COULD GO TO SLEEP AND NOT WAKE UP?: NO
2. HAVE YOU ACTUALLY HAD ANY THOUGHTS OF KILLING YOURSELF?: NO

## 2025-05-06 ASSESSMENT — PAIN DESCRIPTION - LOCATION: LOCATION: SHOULDER

## 2025-05-06 ASSESSMENT — PAIN DESCRIPTION - PAIN TYPE: TYPE: CHRONIC PAIN

## 2025-05-06 ASSESSMENT — PAIN DESCRIPTION - FREQUENCY: FREQUENCY: CONSTANT/CONTINUOUS

## 2025-05-06 ASSESSMENT — PAIN DESCRIPTION - DESCRIPTORS: DESCRIPTORS: ACHING

## 2025-05-06 ASSESSMENT — PAIN SCALES - GENERAL
PAINLEVEL_OUTOF10: 0 - NO PAIN
PAINLEVEL_OUTOF10: 0 - NO PAIN
PAINLEVEL_OUTOF10: 3
PAINLEVEL_OUTOF10: 3

## 2025-05-06 ASSESSMENT — PAIN - FUNCTIONAL ASSESSMENT: PAIN_FUNCTIONAL_ASSESSMENT: 0-10

## 2025-05-06 NOTE — ED TRIAGE NOTES
"Pt here with multiple complaints. Pt states he has been treated for pneumonia recently. States to have diarrhea and his PCP ordered him a C. Diff test that patient brought in with him today in the ED rather than bringing it to the lab. Pt denies any acute pain or complaints. Only states to have chronic shoulder pain that \"he always has for a long time now\" Pt takes morphine around the clock for this everyday. Wife believes patient is dehydrated.  "

## 2025-05-06 NOTE — ED PROVIDER NOTES
Emergency Department Provider Note       History of Present Illness     History provided by: Patient  Limitations to History: None  External Records Reviewed with Brief Summary: None    HPI:  Naveen Arauz is a 80 y.o. male h/ pneumonia on Keflex has been having after he has been antibiotics the patient diarrhea for more zena a weak, complain of C. difficile in the past when he was treated for staph infection for prolonged period time.  He got better after treatment for the C. difficile for starting C. difficile Symptoms again with diarrhea and conservative with him and decided come to ER for evaluation.  Denies any chest pain or throat and difficult breathing.  Denies any fever or chills.  Denies any blood in stool or black stool.   Patient states that he is on different medicine he was treated for a long time for skin rash in the past.  Patient has history of A-fib on Xarelto.  Stool collected by the lab for c diff ordered by pcp, feels dehydrated. H/o parial gastrectomy years ago  Denies bllood in stool or black stooly    Physical Exam   Triage vitals:  T 35.6 °C (96.1 °F)  HR 77  /79  RR 16  O2 100 % None (Room air)    General: Awake, alert, in no acute distress abnormal lung sounds with coarse rhonchi and expiratory wheezing and crackles.  No respiratory distress noted  Eyes: Gaze conjugate.  No scleral icterus or injection  HENT: Normo-cephalic, atraumatic. No stridor  CV: Regular rate, regular rhythm. Radial pulses 2+ bilaterally  Resp: Breathing non-labored, speaking in full sentences.  Had coarse, expiratory wheezing and crackles on auscultation.  No chest or retraction respirations noted.      GI: Soft, non-distended, non-tender. No rebound or guarding.  :  complaints  MSK/Extremities: No gross bony deformities. Moving all extremities  Skin: Warm. Appropriate color  Neuro: Alert. Oriented. Face symmetric. Speech is fluent.  Gross strength and sensation intact in b/l UE and LEs  Psych:  Appropriate mood and affect      Medical Decision Making & ED Course   Medical Decision Makin y.o. male brought to the emergency room with complaint of diarrhea after he has been on Keflex and complain generalized fatigue and weakness admitted with cough and congestion.  Denies any chest pain.  Denies any blood in stool or black stool.  He has history of C. difficile due to antibiotic treatment.  Upon examination was awake alert noted coughing coarse rhonchi with some expiratory wheezing.  However moving air well bilaterally no chest wall injection abdomen soft nontender no guarding rebound surgery.  Able to move his arms and legs neck is supple.    Due to presenting patient symptom workup was initiated in the ER.  Patient serum glucose was 94 sodium 130 potassium 3.7 and anion gap was low 9 urinalysis showed 1+ ketones was unremarkable white count 8.5 H&H is 13 and 41 differential essentially unremarkable with slight left shift.  Lactate of 1.2.  Magnesium normal at 1.73.  2 set of troponin negative.  CT  Other chest abdomen pelvis obtained without IV contrast CT showed aneurysmal dilatation of the ascending aorta admission patient found to have multifocal pneumonia, compression deformity L1-L2 and lung CT also showed lung lesion highly suspected for malignancy     I requested patient transferred to high-level facility after case discussed with patient blood cultures patient was started on IV antibiotic and subsequently patient has accepted to [facility] by cardiologist they will obtain service patient is not and thoracic surgeon consultation.      Differential diagnoses considered include but are not limited to: Multifocal pneumonia, lung mass, malignancy, sepsis, pneumonia, chronic lung disease, aneurysmal dilatation aorta, dissection, fungal infection, chronic lung disease.    Social Determinants of Health which Significantly Impact Care: None noted    EKG Independent Interpretation: EKG interpreted by myself.  Please see ED Course for full interpretation.    Independent Result Review and Interpretation: Labs reviewed and interpreted by me I also did the interpretation EKG CT read by radiologist and I reviewed the CT images.    Chronic conditions affecting the patient's care: Multiple chronic comorbidities unaware of abnormal lung finding possibility for some findings suspicious for lung mass    The patient was discussed with the following consultants/services:   Accepting physician  Care Considerations: As described in [facility]    ED Course:  ED Course as of 05/15/25 0800   Thu May 15, 2025   0753 CT chest abdomen pelvis wo IV contrast [SP]      ED Course User Index  [SP] Lamont Burnette MD         Diagnoses as of 05/15/25 0800   Multifocal pneumonia   Suspected lung cancer   History of cardiac ablation for atrial fibrillation   Coagulopathy (Multi)   Anticoagulated on Xarelto   Ruptured aneurysm of ascending aorta     EKG obtained at 1146 1 hours showed normal sinus rhythm with PVCs rate of 76.  No ST-T elevation.  No STEMI.  Abnormal EKG.  I read this EKG.  ; x  As a result of their workup, the patient will require transfer to another facility.  The patient and/or his guardian/representative is agreeable to transfer at this time.   We will continue to monitor and manage the patient in the Emergency Department until transport for transfer can be arranged.    Procedures   CYNTHIA Grant MD  05/15/25 0801

## 2025-05-06 NOTE — DISCHARGE INSTRUCTIONS
Patient transferred has been accepted to [facility] by Dr. Rebeca Escobar  Hospitalist on-call at [facility].  Patient and family agreed.  He is aware he will receive workup aortic aneurysm, multifocal pneumonia, diarrhea possible or rule out C. difficile, failure outpatient treatment, antra coagulated with Xarelto with elevated PT/INR with coagulopathy.  Weight loss and suspected lesion in the lung for cancer.

## 2025-05-07 ENCOUNTER — HOSPITAL ENCOUNTER (OUTPATIENT)
Facility: HOSPITAL | Age: 80
Setting detail: OBSERVATION
LOS: 1 days | Discharge: HOME | End: 2025-05-07
Attending: STUDENT IN AN ORGANIZED HEALTH CARE EDUCATION/TRAINING PROGRAM | Admitting: STUDENT IN AN ORGANIZED HEALTH CARE EDUCATION/TRAINING PROGRAM
Payer: MEDICARE

## 2025-05-07 VITALS
HEIGHT: 72 IN | DIASTOLIC BLOOD PRESSURE: 79 MMHG | WEIGHT: 149.03 LBS | TEMPERATURE: 96.8 F | SYSTOLIC BLOOD PRESSURE: 139 MMHG | BODY MASS INDEX: 20.19 KG/M2 | HEART RATE: 76 BPM | OXYGEN SATURATION: 96 % | RESPIRATION RATE: 18 BRPM

## 2025-05-07 DIAGNOSIS — I71.21 ANEURYSM OF ASCENDING AORTA WITHOUT RUPTURE: ICD-10-CM

## 2025-05-07 DIAGNOSIS — K21.9 GASTROESOPHAGEAL REFLUX DISEASE WITHOUT ESOPHAGITIS: ICD-10-CM

## 2025-05-07 DIAGNOSIS — R91.1 LUNG NODULE: ICD-10-CM

## 2025-05-07 DIAGNOSIS — J18.9 MULTIFOCAL PNEUMONIA: Primary | ICD-10-CM

## 2025-05-07 DIAGNOSIS — D68.9 COAGULOPATHY (MULTI): ICD-10-CM

## 2025-05-07 PROBLEM — K52.1 DIARRHEA DUE TO DRUG: Status: ACTIVE | Noted: 2025-05-07

## 2025-05-07 PROBLEM — I77.810 DILATATION OF THORACIC AORTA: Status: ACTIVE | Noted: 2025-05-07

## 2025-05-07 PROBLEM — R79.1 ELEVATED INR: Status: ACTIVE | Noted: 2025-05-07

## 2025-05-07 LAB
ABO GROUP (TYPE) IN BLOOD: NORMAL
ALBUMIN SERPL BCP-MCNC: 2.7 G/DL (ref 3.4–5)
ALP SERPL-CCNC: 89 U/L (ref 33–136)
ALT SERPL W P-5'-P-CCNC: 7 U/L (ref 10–52)
ANION GAP SERPL CALC-SCNC: 13 MMOL/L (ref 10–20)
ANTIBODY SCREEN: NORMAL
APTT PPP: 35 SECONDS (ref 26–36)
APTT PPP: 39 SECONDS (ref 26–36)
AST SERPL W P-5'-P-CCNC: 13 U/L (ref 9–39)
BASOPHILS # BLD MANUAL: 0.05 X10*3/UL (ref 0–0.1)
BASOPHILS NFR BLD MANUAL: 0.8 %
BILIRUB DIRECT SERPL-MCNC: 0.1 MG/DL (ref 0–0.3)
BILIRUB SERPL-MCNC: 0.5 MG/DL (ref 0–1.2)
BLASTS # BLD MANUAL: 0 X10*3/UL
BLASTS NFR BLD MANUAL: 0 %
BUN SERPL-MCNC: 14 MG/DL (ref 6–23)
BURR CELLS BLD QL SMEAR: NORMAL
CALCIUM SERPL-MCNC: 8.1 MG/DL (ref 8.6–10.6)
CHLORIDE SERPL-SCNC: 107 MMOL/L (ref 98–107)
CO2 SERPL-SCNC: 21 MMOL/L (ref 21–32)
CREAT SERPL-MCNC: 0.81 MG/DL (ref 0.5–1.3)
EGFRCR SERPLBLD CKD-EPI 2021: 90 ML/MIN/1.73M*2
EOSINOPHIL # BLD MANUAL: 0.05 X10*3/UL (ref 0–0.4)
EOSINOPHIL NFR BLD MANUAL: 0.9 %
ERYTHROCYTE [DISTWIDTH] IN BLOOD BY AUTOMATED COUNT: 13.7 % (ref 11.5–14.5)
FIBRINOGEN PPP-MCNC: 252 MG/DL (ref 200–400)
FLUAV RNA RESP QL NAA+PROBE: NOT DETECTED
FLUBV RNA RESP QL NAA+PROBE: NOT DETECTED
GLUCOSE SERPL-MCNC: 89 MG/DL (ref 74–99)
HCT VFR BLD AUTO: 36.4 % (ref 41–52)
HGB BLD-MCNC: 11.6 G/DL (ref 13.5–17.5)
IMM GRANULOCYTES # BLD AUTO: 0.02 X10*3/UL (ref 0–0.5)
IMM GRANULOCYTES NFR BLD AUTO: 0.3 % (ref 0–0.9)
INR PPP: 1.5 (ref 0.9–1.1)
INR PPP: 2.3 (ref 0.9–1.1)
LDH SERPL L TO P-CCNC: 115 U/L (ref 84–246)
LEGIONELLA AG UR QL: NEGATIVE
LYMPHOCYTES # BLD MANUAL: 1.14 X10*3/UL (ref 0.8–3)
LYMPHOCYTES NFR BLD MANUAL: 19.3 %
MAGNESIUM SERPL-MCNC: 1.74 MG/DL (ref 1.6–2.4)
MCH RBC QN AUTO: 32.2 PG (ref 26–34)
MCHC RBC AUTO-ENTMCNC: 31.9 G/DL (ref 32–36)
MCV RBC AUTO: 101 FL (ref 80–100)
METAMYELOCYTES # BLD MANUAL: 0 X10*3/UL
METAMYELOCYTES NFR BLD MANUAL: 0 %
MONOCYTES # BLD MANUAL: 0.35 X10*3/UL (ref 0.05–0.8)
MONOCYTES NFR BLD MANUAL: 5.9 %
MYELOCYTES # BLD MANUAL: 0 X10*3/UL
MYELOCYTES NFR BLD MANUAL: 0 %
NEUTROPHILS # BLD MANUAL: 4.27 X10*3/UL (ref 1.6–5.5)
NEUTS BAND # BLD MANUAL: 0 X10*3/UL (ref 0–0.5)
NEUTS BAND NFR BLD MANUAL: 0 %
NEUTS SEG # BLD MANUAL: 4.27 X10*3/UL (ref 1.6–5)
NEUTS SEG NFR BLD MANUAL: 72.3 %
NRBC BLD MANUAL-RTO: 0 % (ref 0–0)
NRBC BLD-RTO: 0 /100 WBCS (ref 0–0)
PHOSPHATE SERPL-MCNC: 2.9 MG/DL (ref 2.5–4.9)
PLASMA CELLS # BLD MANUAL: 0 X10*3/UL
PLASMA CELLS NFR BLD MANUAL: 0 %
PLATELET # BLD AUTO: 253 X10*3/UL (ref 150–450)
POTASSIUM SERPL-SCNC: 4 MMOL/L (ref 3.5–5.3)
PROCALCITONIN SERPL-MCNC: 0.05 NG/ML
PROMYELOCYTES # BLD MANUAL: 0 X10*3/UL
PROMYELOCYTES NFR BLD MANUAL: 0 %
PROT SERPL-MCNC: 4.9 G/DL (ref 6.4–8.2)
PROTHROMBIN TIME: 16.4 SECONDS (ref 9.8–12.4)
PROTHROMBIN TIME: 25.3 SECONDS (ref 9.8–12.4)
RBC # BLD AUTO: 3.6 X10*6/UL (ref 4.5–5.9)
RBC MORPH BLD: NORMAL
RH FACTOR (ANTIGEN D): NORMAL
RSV RNA RESP QL NAA+PROBE: NOT DETECTED
S PNEUM AG UR QL: NEGATIVE
SARS-COV-2 RNA RESP QL NAA+PROBE: NOT DETECTED
SODIUM SERPL-SCNC: 137 MMOL/L (ref 136–145)
TOTAL CELLS COUNTED BLD: 119
VARIANT LYMPHS # BLD MANUAL: 0.05 X10*3/UL (ref 0–0.3)
VARIANT LYMPHS NFR BLD: 0.8 %
WBC # BLD AUTO: 5.9 X10*3/UL (ref 4.4–11.3)

## 2025-05-07 PROCEDURE — 84100 ASSAY OF PHOSPHORUS: CPT

## 2025-05-07 PROCEDURE — 87040 BLOOD CULTURE FOR BACTERIA: CPT | Performed by: FAMILY MEDICINE

## 2025-05-07 PROCEDURE — 84145 PROCALCITONIN (PCT): CPT

## 2025-05-07 PROCEDURE — 87637 SARSCOV2&INF A&B&RSV AMP PRB: CPT

## 2025-05-07 PROCEDURE — 86901 BLOOD TYPING SEROLOGIC RH(D): CPT

## 2025-05-07 PROCEDURE — 85007 BL SMEAR W/DIFF WBC COUNT: CPT

## 2025-05-07 PROCEDURE — 85730 THROMBOPLASTIN TIME PARTIAL: CPT

## 2025-05-07 PROCEDURE — 99236 HOSP IP/OBS SAME DATE HI 85: CPT | Performed by: STUDENT IN AN ORGANIZED HEALTH CARE EDUCATION/TRAINING PROGRAM

## 2025-05-07 PROCEDURE — 85520 HEPARIN ASSAY: CPT

## 2025-05-07 PROCEDURE — 85027 COMPLETE CBC AUTOMATED: CPT

## 2025-05-07 PROCEDURE — 83735 ASSAY OF MAGNESIUM: CPT

## 2025-05-07 PROCEDURE — G0378 HOSPITAL OBSERVATION PER HR: HCPCS

## 2025-05-07 PROCEDURE — 99221 1ST HOSP IP/OBS SF/LOW 40: CPT | Performed by: STUDENT IN AN ORGANIZED HEALTH CARE EDUCATION/TRAINING PROGRAM

## 2025-05-07 PROCEDURE — 84075 ASSAY ALKALINE PHOSPHATASE: CPT

## 2025-05-07 PROCEDURE — 2500000001 HC RX 250 WO HCPCS SELF ADMINISTERED DRUGS (ALT 637 FOR MEDICARE OP)

## 2025-05-07 PROCEDURE — 36415 COLL VENOUS BLD VENIPUNCTURE: CPT

## 2025-05-07 PROCEDURE — 87798 DETECT AGENT NOS DNA AMP: CPT

## 2025-05-07 PROCEDURE — 83615 LACTATE (LD) (LDH) ENZYME: CPT

## 2025-05-07 PROCEDURE — 85610 PROTHROMBIN TIME: CPT

## 2025-05-07 PROCEDURE — 85384 FIBRINOGEN ACTIVITY: CPT

## 2025-05-07 PROCEDURE — 2500000002 HC RX 250 W HCPCS SELF ADMINISTERED DRUGS (ALT 637 FOR MEDICARE OP, ALT 636 FOR OP/ED)

## 2025-05-07 RX ORDER — NALOXONE HYDROCHLORIDE 0.4 MG/ML
0.4 INJECTION, SOLUTION INTRAMUSCULAR; INTRAVENOUS; SUBCUTANEOUS EVERY 5 MIN PRN
Status: DISCONTINUED | OUTPATIENT
Start: 2025-05-07 | End: 2025-05-07 | Stop reason: HOSPADM

## 2025-05-07 RX ORDER — PANTOPRAZOLE SODIUM 40 MG/1
40 TABLET, DELAYED RELEASE ORAL
Status: DISCONTINUED | OUTPATIENT
Start: 2025-05-07 | End: 2025-05-07 | Stop reason: HOSPADM

## 2025-05-07 RX ORDER — ATORVASTATIN CALCIUM 40 MG/1
40 TABLET, FILM COATED ORAL NIGHTLY
Status: DISCONTINUED | OUTPATIENT
Start: 2025-05-07 | End: 2025-05-07 | Stop reason: HOSPADM

## 2025-05-07 RX ORDER — ALBUTEROL SULFATE 90 UG/1
2 INHALANT RESPIRATORY (INHALATION) 4 TIMES DAILY PRN
Status: DISCONTINUED | OUTPATIENT
Start: 2025-05-07 | End: 2025-05-07 | Stop reason: HOSPADM

## 2025-05-07 RX ORDER — PHYTONADIONE 5 MG/1
2.5 TABLET ORAL EVERY OTHER DAY
Qty: 15 TABLET | Refills: 0 | Status: SHIPPED | OUTPATIENT
Start: 2025-05-07 | End: 2025-05-07

## 2025-05-07 RX ORDER — PHYTONADIONE 5 MG/1
2.5 TABLET ORAL EVERY OTHER DAY
Qty: 15 TABLET | Refills: 0 | Status: SHIPPED | OUTPATIENT
Start: 2025-05-07

## 2025-05-07 RX ORDER — MORPHINE SULFATE 30 MG/1
30 TABLET, FILM COATED, EXTENDED RELEASE ORAL EVERY 12 HOURS SCHEDULED
Status: DISCONTINUED | OUTPATIENT
Start: 2025-05-07 | End: 2025-05-07 | Stop reason: HOSPADM

## 2025-05-07 RX ORDER — CEFTRIAXONE 1 G/50ML
1 INJECTION, SOLUTION INTRAVENOUS EVERY 24 HOURS
Status: DISCONTINUED | OUTPATIENT
Start: 2025-05-07 | End: 2025-05-07 | Stop reason: HOSPADM

## 2025-05-07 RX ORDER — PHYTONADIONE 5 MG/1
5 TABLET ORAL ONCE
Status: COMPLETED | OUTPATIENT
Start: 2025-05-07 | End: 2025-05-07

## 2025-05-07 RX ORDER — CYCLOBENZAPRINE HCL 10 MG
5 TABLET ORAL NIGHTLY PRN
Status: DISCONTINUED | OUTPATIENT
Start: 2025-05-07 | End: 2025-05-07 | Stop reason: HOSPADM

## 2025-05-07 RX ORDER — METOPROLOL SUCCINATE 50 MG/1
50 TABLET, EXTENDED RELEASE ORAL DAILY
Status: DISCONTINUED | OUTPATIENT
Start: 2025-05-07 | End: 2025-05-07 | Stop reason: HOSPADM

## 2025-05-07 RX ORDER — POLYETHYLENE GLYCOL 3350 17 G/17G
17 POWDER, FOR SOLUTION ORAL DAILY PRN
Status: DISCONTINUED | OUTPATIENT
Start: 2025-05-07 | End: 2025-05-07 | Stop reason: HOSPADM

## 2025-05-07 RX ORDER — ASPIRIN 81 MG/1
81 TABLET ORAL DAILY
Status: DISCONTINUED | OUTPATIENT
Start: 2025-05-07 | End: 2025-05-07 | Stop reason: HOSPADM

## 2025-05-07 RX ADMIN — MORPHINE SULFATE 30 MG: 30 TABLET, FILM COATED, EXTENDED RELEASE ORAL at 09:04

## 2025-05-07 RX ADMIN — PHYTONADIONE 5 MG: 5 TABLET ORAL at 11:42

## 2025-05-07 RX ADMIN — METOPROLOL SUCCINATE 50 MG: 50 TABLET, EXTENDED RELEASE ORAL at 09:00

## 2025-05-07 RX ADMIN — PANTOPRAZOLE SODIUM 40 MG: 40 TABLET, DELAYED RELEASE ORAL at 06:51

## 2025-05-07 SDOH — ECONOMIC STABILITY: HOUSING INSECURITY: AT ANY TIME IN THE PAST 12 MONTHS, WERE YOU HOMELESS OR LIVING IN A SHELTER (INCLUDING NOW)?: NO

## 2025-05-07 SDOH — ECONOMIC STABILITY: HOUSING INSECURITY: IN THE LAST 12 MONTHS, WAS THERE A TIME WHEN YOU WERE NOT ABLE TO PAY THE MORTGAGE OR RENT ON TIME?: NO

## 2025-05-07 SDOH — ECONOMIC STABILITY: FOOD INSECURITY: HOW HARD IS IT FOR YOU TO PAY FOR THE VERY BASICS LIKE FOOD, HOUSING, MEDICAL CARE, AND HEATING?: NOT VERY HARD

## 2025-05-07 SDOH — ECONOMIC STABILITY: FOOD INSECURITY: WITHIN THE PAST 12 MONTHS, YOU WORRIED THAT YOUR FOOD WOULD RUN OUT BEFORE YOU GOT THE MONEY TO BUY MORE.: NEVER TRUE

## 2025-05-07 SDOH — SOCIAL STABILITY: SOCIAL INSECURITY: HAS ANYONE EVER THREATENED TO HURT YOUR FAMILY OR YOUR PETS?: NO

## 2025-05-07 SDOH — SOCIAL STABILITY: SOCIAL INSECURITY: WITHIN THE LAST YEAR, HAVE YOU BEEN AFRAID OF YOUR PARTNER OR EX-PARTNER?: NO

## 2025-05-07 SDOH — SOCIAL STABILITY: SOCIAL INSECURITY: WITHIN THE LAST YEAR, HAVE YOU BEEN HUMILIATED OR EMOTIONALLY ABUSED IN OTHER WAYS BY YOUR PARTNER OR EX-PARTNER?: NO

## 2025-05-07 SDOH — SOCIAL STABILITY: SOCIAL INSECURITY: ARE THERE ANY APPARENT SIGNS OF INJURIES/BEHAVIORS THAT COULD BE RELATED TO ABUSE/NEGLECT?: NO

## 2025-05-07 SDOH — ECONOMIC STABILITY: INCOME INSECURITY: IN THE PAST 12 MONTHS HAS THE ELECTRIC, GAS, OIL, OR WATER COMPANY THREATENED TO SHUT OFF SERVICES IN YOUR HOME?: NO

## 2025-05-07 SDOH — SOCIAL STABILITY: SOCIAL INSECURITY: DO YOU FEEL UNSAFE GOING BACK TO THE PLACE WHERE YOU ARE LIVING?: NO

## 2025-05-07 SDOH — ECONOMIC STABILITY: FOOD INSECURITY: WITHIN THE PAST 12 MONTHS, THE FOOD YOU BOUGHT JUST DIDN'T LAST AND YOU DIDN'T HAVE MONEY TO GET MORE.: NEVER TRUE

## 2025-05-07 SDOH — ECONOMIC STABILITY: HOUSING INSECURITY: IN THE PAST 12 MONTHS, HOW MANY TIMES HAVE YOU MOVED WHERE YOU WERE LIVING?: 0

## 2025-05-07 SDOH — SOCIAL STABILITY: SOCIAL INSECURITY: DOES ANYONE TRY TO KEEP YOU FROM HAVING/CONTACTING OTHER FRIENDS OR DOING THINGS OUTSIDE YOUR HOME?: NO

## 2025-05-07 SDOH — ECONOMIC STABILITY: TRANSPORTATION INSECURITY: IN THE PAST 12 MONTHS, HAS LACK OF TRANSPORTATION KEPT YOU FROM MEDICAL APPOINTMENTS OR FROM GETTING MEDICATIONS?: NO

## 2025-05-07 SDOH — SOCIAL STABILITY: SOCIAL INSECURITY: ARE YOU OR HAVE YOU BEEN THREATENED OR ABUSED PHYSICALLY, EMOTIONALLY, OR SEXUALLY BY ANYONE?: NO

## 2025-05-07 SDOH — SOCIAL STABILITY: SOCIAL INSECURITY: DO YOU FEEL ANYONE HAS EXPLOITED OR TAKEN ADVANTAGE OF YOU FINANCIALLY OR OF YOUR PERSONAL PROPERTY?: NO

## 2025-05-07 SDOH — SOCIAL STABILITY: SOCIAL INSECURITY: WERE YOU ABLE TO COMPLETE ALL THE BEHAVIORAL HEALTH SCREENINGS?: YES

## 2025-05-07 SDOH — SOCIAL STABILITY: SOCIAL INSECURITY: HAVE YOU HAD THOUGHTS OF HARMING ANYONE ELSE?: NO

## 2025-05-07 SDOH — SOCIAL STABILITY: SOCIAL INSECURITY: ABUSE: ADULT

## 2025-05-07 SDOH — SOCIAL STABILITY: SOCIAL INSECURITY: HAVE YOU HAD ANY THOUGHTS OF HARMING ANYONE ELSE?: NO

## 2025-05-07 ASSESSMENT — LIFESTYLE VARIABLES
AUDIT-C TOTAL SCORE: 1
HOW OFTEN DO YOU HAVE A DRINK CONTAINING ALCOHOL: MONTHLY OR LESS
AUDIT-C TOTAL SCORE: 1
HOW OFTEN DO YOU HAVE 6 OR MORE DRINKS ON ONE OCCASION: NEVER
HOW MANY STANDARD DRINKS CONTAINING ALCOHOL DO YOU HAVE ON A TYPICAL DAY: 1 OR 2
SKIP TO QUESTIONS 9-10: 1

## 2025-05-07 ASSESSMENT — ACTIVITIES OF DAILY LIVING (ADL)
FEEDING YOURSELF: INDEPENDENT
PATIENT'S MEMORY ADEQUATE TO SAFELY COMPLETE DAILY ACTIVITIES?: YES
WALKS IN HOME: INDEPENDENT
GROOMING: INDEPENDENT
DRESSING YOURSELF: INDEPENDENT
BATHING: INDEPENDENT
TOILETING: INDEPENDENT
HEARING - LEFT EAR: FUNCTIONAL
JUDGMENT_ADEQUATE_SAFELY_COMPLETE_DAILY_ACTIVITIES: YES
HEARING - RIGHT EAR: FUNCTIONAL
LACK_OF_TRANSPORTATION: NO
LACK_OF_TRANSPORTATION: NO
ADEQUATE_TO_COMPLETE_ADL: YES
ASSISTIVE_DEVICE: CONTACTS;EYEGLASSES

## 2025-05-07 ASSESSMENT — COGNITIVE AND FUNCTIONAL STATUS - GENERAL
CLIMB 3 TO 5 STEPS WITH RAILING: A LITTLE
MOBILITY SCORE: 22
DAILY ACTIVITIY SCORE: 24
MOBILITY SCORE: 22
PATIENT BASELINE BEDBOUND: NO
WALKING IN HOSPITAL ROOM: A LITTLE
CLIMB 3 TO 5 STEPS WITH RAILING: A LITTLE
DAILY ACTIVITIY SCORE: 24
WALKING IN HOSPITAL ROOM: A LITTLE

## 2025-05-07 ASSESSMENT — PAIN - FUNCTIONAL ASSESSMENT
PAIN_FUNCTIONAL_ASSESSMENT: 0-10
PAIN_FUNCTIONAL_ASSESSMENT: 0-10

## 2025-05-07 ASSESSMENT — PAIN SCALES - GENERAL
PAINLEVEL_OUTOF10: 0 - NO PAIN
PAINLEVEL_OUTOF10: 0 - NO PAIN

## 2025-05-07 NOTE — H&P
"Subjective     Chief concern: Diarrhea      History of present illness:  Naveen Arauz \"Rick\" is a 80 y.o. male with PMHX notable for Afib (Xeralto), C diff colitis, Peptic ulcer disease s/p partial gastrectomy, Lyme disease, HTN, HLD, GERD, and CAD s/p PCI to RCA.     Originally presented to outside hospital hoping to drop off his C Diff study ordered by his PCP with complaints of diarrhea for the last 1 to 2 weeks.  He reported at that time that he had a pneumonia previously and had recently completed antibiotics.  CT chest abdomen pelvis was ordered revealing Aneurysmal dilation of the thoracic aorta (4.3 cm), bilateral GGO's concerning for multifocal pneumonia, nonobstructive left kidney stone (0.26 cm), and irregular nodular density (1.3 cm) left midlung field concerning for malignancy.  Additional lab work also revealed an elevated pro time (>100) and an INR >10.  Lab work otherwise unremarkable, hemoglobin stable/improved at 13.3 baseline (12), troponin of 13, lipase of 7.  For this he was started on ceftriaxone azithromycin for community-acquired pneumonia.    On interview he reports that his respiratory symptoms have continued improved even after completing his antibiotics.  Declines any recent fevers/chills, productive cough, night sweats, sick contacts, nausea vomiting.  Does endorse diarrhea for the last 1 to 2 weeks of unclear etiology.  He was being evaluated for C. difficile by his PCP and appears he had appropriately brought the study to the emergency department.  The diarrhea has improved as of recent he is down to 1-2 bowel movements a day.  He does maintain good appetite and ability to tolerate p.o. diet.  Denies any current chest pain/tightness.  Additionally denies any recent concerning bleeding (no conjunctival hemorrhage, petechiae, hemoptysis, hematemesis, hematuria, BRBPR/melena).      ED course:   Vital signs:  [05/07/25 0127]   Temp Heart Rate Resp BP   36.4 °C (97.5 °F) 74 17 153/78    "   SpO2 Temp Source Heart Rate Source Patient Position   99 % Temporal Monitor Sitting      BP Location FiO2 (%)     Right arm --          ECG:  NSR, with PAC/PVC . No evidence of acute ischemia on review.      Labs:   Last CBC:  Lab Results   Component Value Date    WBC 8.5 05/06/2025    HGB 13.3 (L) 05/06/2025    HCT 41.9 05/06/2025     05/06/2025     05/06/2025       Last RFP:  Lab Results   Component Value Date    GLUCOSE 94 05/06/2025    CALCIUM 8.5 (L) 05/06/2025     (L) 05/06/2025    K 3.7 05/06/2025    CO2 23 05/06/2025     05/06/2025    BUN 18 05/06/2025    CREATININE 0.94 05/06/2025       Last LFTs:  Lab Results   Component Value Date    ALT 9 (L) 05/06/2025    AST 16 05/06/2025    ALKPHOS 97 05/06/2025    BILITOT 0.5 05/06/2025       Last coags:  Lab Results   Component Value Date    INR  05/06/2025      Comment:      Unable To Calculate INR    APTT 56 (H) 05/06/2025         Imaging:  CT CAP (5/6/2025):  IMPRESSION:  1. 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  2. Scattered groundglass opacities noted within the anterior left  upper lobe and left lower lobe, anterior right upper lobe with  ill-defined nodular and groundglass opacities within the posterior  left lower lung compatible with multifocal pneumonia.  3. There is an irregular nodular density of 1.3 cm within the left  midlung field concerning for a primary lung malignancy.  PET/CT  imaging suggested for further evaluation.  4. Cholelithiasis without evidence of acute cholecystitis.  5. Nonobstructing left midpole stone measuring 0.26 cm.  6. Compression fractures involving the upper L1 and L2 vertebral  bodies which a 20-25% loss of the vertebral body height. Findings are  indeterminate but new, when compared with the prior study dated    Micro/culture data:  No results found for the last 90 days.          Relevant Imaging/Reports:  CT Angio PE  1/5/2025:  IMPRESSION:  CT CHEST/ABDOMEN/PELVIS:  1. Multifocal consolidative pneumonia within the bilateral lower  lobes and right middle lobe with diffuse airways wall thickening  concerning for endobronchial spread of infection. An aspiration  etiology is not excluded given the bibasilar predominant distribution  given the small amount of layering debris in the trachea and right  mainstem/lower lobe bronchus.  2. Mild diffuse mediastinal and bilateral hilar lymphadenopathy that  is likely at least in part reactive to the pneumonia and could be  chronic in the setting of emphysema as well.  3. Emphysema and a 0.3 cm right upper lobe pulmonary nodule.  Continued annual low-dose lung cancer screening is recommended.  4. Aneurysmal dilatation of the ascending aorta measuring 4.2 x 4.1  cm and mildly dilated main pulmonary artery that is suggestive of  pulmonary hypertension.  5. No acute pulmonary embolism.  6. Cholelithiasis without evidence of acute cholecystitis.  7. No acute traumatic injury in the chest, abdomen, or pelvis.            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    Past Surgical History:  He has a past surgical history that includes MR angio head wo IV contrast (11/04/2018); MR angio neck wo IV contrast (11/04/2018); Cardiac catheterization (N/A, 11/17/2023); Other surgical history; Other surgical history (Bilateral); Other surgical history; Other surgical history; Other surgical history; and Anterior cervical discectomy w/ fusion (03/21/2025).     Social history:  Alcohol:   Alcohol Use: Not At Risk (5/7/2025)    AUDIT-C     Frequency of Alcohol Consumption: Monthly or less     Average Number of Drinks: 1 or 2     Frequency of Binge Drinking: Never     Tobacco:   Tobacco Use: Medium Risk (5/7/2025)     Patient History     Smoking Tobacco Use: Former     Smokeless Tobacco Use: Never     Passive Exposure: Not on file      Illicit Drugs:   Social History     Substance and Sexual Activity   Drug Use Never       He reports that he quit smoking about 52 years ago. His smoking use included cigarettes. He has never used smokeless tobacco. He reports current alcohol use. He reports that he does not use drugs.  I have confirmed the above Social History with particular focus on recent Tobacco, Alcohol, and Substance use.      Family history:  Family History[1]     Allergies:  Nsaids (non-steroidal anti-inflammatory drug)    Home medications:  Prior to Admission medications    Medication Sig Start Date End Date Taking? Authorizing Provider   albuterol (Ventolin HFA) 90 mcg/actuation inhaler Inhale 2 puffs 4 times a day as needed for wheezing or shortness of breath.    Historical Provider, MD   aspirin 81 mg EC tablet Take 1 tablet (81 mg) by mouth once daily. 11/19/18   Historical Provider, MD   atorvastatin (Lipitor) 40 mg tablet TAKE 1 TABLET BY MOUTH EVERY NIGHT AT BEDTIME 11/18/24   Elaine Welch, APRN-CNP   cyclobenzaprine (Flexeril) 5 mg tablet Take 1 tablet (5 mg) by mouth as needed at bedtime for muscle spasms. 2/17/25   Raúl Kaur PA-C   dexlansoprazole (Dexilant) 60 mg DR capsule Take 1 capsule (60 mg) by mouth once daily. 11/19/18   Historical Provider, MD   metoprolol succinate XL (Toprol-XL) 100 mg 24 hr tablet Take 0.5 tablets (50 mg) by mouth once daily. Do not crush or chew.    Historical Provider, MD   morphine CR (MS Contin) 30 mg 12 hr tablet TAKE ONE TABLET BY MOUTH EVERY 8 HOURS FOR PAIN -TAKE 1 TABLET TWICE A DAY SCHEDULED AND MAY TAKE ONE EXTRA DOSE OF MORPHINE SA IN THE AFTERNOON IF NEEDED FOR PAIN -TAKE 1 TABLET TWICE A DAY SCHEDULED AND MAY TAKE ONE EXTRA DOSE OF MORPHINE SA IN THE AFTERNOON IF NEEDED FOR PAIN 11/19/18   Historical Provider, MD   naloxone (Narcan) 4 mg/0.1 mL nasal spray  "Administer 1 spray (4 mg) into affected nostril(s) if needed for opioid reversal. May repeat every 2-3 minutes if needed, alternating nostrils, until medical assistance becomes available. 2/28/25   Ramiro Perez MD PhD   rivaroxaban (Xarelto) 20 mg tablet Take 1 tablet (20 mg) by mouth once daily. Do not fill before March 28, 2025. 3/28/25   Jose Thomas MD   tiZANidine (Zanaflex) 4 mg tablet Take 1 tablet (4 mg) by mouth every 6 hours if needed for muscle spasms. 2/28/25 3/30/25  Ramiro Perez MD PhD          Objective   Vital signs:  Blood pressure 141/74, pulse 66, temperature 36.8 °C (98.2 °F), temperature source Temporal, resp. rate 18, height 1.829 m (6' 0.01\"), weight 67.6 kg (149 lb 0.5 oz), SpO2 97%.    Physical Exam  Constitutional:       General: He is not in acute distress.     Appearance: Normal appearance. He is not ill-appearing, toxic-appearing or diaphoretic.   HENT:      Nose: No congestion or rhinorrhea.      Mouth/Throat:      Mouth: Mucous membranes are moist.   Eyes:      Extraocular Movements: Extraocular movements intact.   Cardiovascular:      Rate and Rhythm: Normal rate and regular rhythm.      Pulses: Normal pulses.      Heart sounds: Normal heart sounds.   Pulmonary:      Effort: Pulmonary effort is normal. No respiratory distress.      Breath sounds: Normal breath sounds. No wheezing or rhonchi. Rales: mild bilateral rales.  Chest:      Chest wall: No tenderness.   Abdominal:      General: Abdomen is flat. Bowel sounds are normal. There is no distension.      Palpations: Abdomen is soft.      Tenderness: There is no abdominal tenderness.   Musculoskeletal:      Right lower leg: No edema.      Left lower leg: No edema.   Lymphadenopathy:      Cervical: No cervical adenopathy.   Skin:     General: Skin is warm and dry.   Neurological:      General: No focal deficit present.      Mental Status: He is alert and oriented to person, place, and time.          Assessment/Plan " "  Naveen Arauz \"Rick\" is a 80 y.o. male with PMHX notable for Afib (Xeralto), CAD s/p PCI, peptic ulcer disease s/p partial gastrectomy/duodenectomy, C. Diff colitis, and recent PNA presents with ongoing diarrhea and recent abnormal imaging. CT revealed bilateral GGOs c/f multifocal PNA, a 1.3 cm irregular lung nodule with interval increase since January, and a thoracic aortic aneurysm (4.3 cm). He was started on broad spectrum abx for the PNA. Labs notable for elevated PT/INR >10/>100.  Diarrhea has nearly resolved but still remains. He is HDS on arrival with no evidence of an active bleed.    #Elevated INR/PT  Unclear etiology, suspect lab error at OSH, Normal LFTs, normal platelets.  currently on Xeralto and ASA  ::Low concern for impending clinical emergency at this time; no evidence of recent bleeding on review  ::CT CAP 5/6 without evidence of bleeding  -Holding ASA/Xeralto for now; pending repeat Coags  -T/S  -If still elevated will consult hematology to assist with therapy; would consider Kcentra vs IV Vitamin K    #Multifocal PNA  #COPD? (Baseline RA at home)  PFTs are not avaible in EMR  Recently found to have CAP and RSV (1/5/25); completed 5 day course Ceft/Azithro  Noted CT Chest bilateral GGOs but appears to have improved and patient without any systemic S/S  -will discontinue Ceft/Azithro, would restart if s/s of infection (next dose would be due 1500).  -fu Blood cultures  -Viral panel  -Strep/Legion   -Procal to assist with classification  -Albuterol PRN    #Diarrhea  Hx of C diff colitis   -C Diff  -stool studies  -CTM    #Pulmonary nodule  ::1.3 cm nodule on CT 5/6 (interval increase compared to CT PE 1/5/25)  -Consider inpatient biopsy vs quick outpatient follow up.  -May require outpatient PET scan for staging.    #Dilated Thoracic Aorta 4.3cm  Asymptomatic, incidental finding  -SBP goal preferably <130, could monitor serially q6mo > q12mo  -Consider Vascular surgery consult in am for " review    #Afib  -c/w home Xeralto once safe to do so    #Prior TIA  -c/w ASA once safe to do so     #Hx of duodenal ulcer as well as chronic ulcer  #GERD  ::S/p removal of most of his stomach and duodenum.  -We will continue ranitidine *  -Substitute Dexilant with formulary Pantoprazole     #History of Lyme disease  -Stable     #Hypertension  -CTM    #HLD  -c/w home statin      F: Replete PRN  E: Replete PRN  N: Regular Diet  DVT Ppx: Home Xeralto when safe  GI Ppx: Pantoprazole   Access/Lines: PIV   Mejía: None  Abx:  Ceft/Azithro  O2: None     Pain regimen: Tylenol / Morphine (home)/ Flexeril (home)/ Dilaudid breakthrough  GI Laxative: Miralax     Code status: Full Code  Emergency contact: Karla Arauz (Spouse) [phone]         To be formally staffed in the morning with attending.    Marcos Hernandez MD  Internal Medicine         [1]   Family History  Problem Relation Name Age of Onset    Heart attack Mother      Heart attack Father      Heart attack Other Aunt

## 2025-05-07 NOTE — HOSPITAL COURSE
"Naveen Arauz \"Marvin" is a 80 y.o. male with PMHX notable for Afib (Xeralto), C diff colitis, Peptic ulcer disease s/p partial gastrectomy, Lyme disease, HTN, HLD, GERD, and CAD s/p PCI to RCA.      Originally presented to outside hospital hoping to drop off his C Diff study ordered by his PCP with complaints of diarrhea for the last 1 to 2 weeks.  He reported at that time that he had a pneumonia previously and had recently completed antibiotics.  CT chest abdomen pelvis was ordered revealing Aneurysmal dilation of the thoracic aorta (4.3 cm), bilateral GGO's concerning for multifocal pneumonia, nonobstructive left kidney stone (0.26 cm), and irregular nodular density (1.3 cm) left midlung field concerning for malignancy.  Additional lab work also revealed an elevated pro time (>100) and an INR >10.  Lab work otherwise unremarkable, hemoglobin stable/improved at 13.3 baseline (12), troponin of 13, lipase of 7.  For this he was started on ceftriaxone azithromycin for community-acquired pneumonia.     On interview he reports that his respiratory symptoms have continued improved even after completing his antibiotics.  Declines any recent fevers/chills, productive cough, night sweats, sick contacts, nausea vomiting.  Does endorse diarrhea for the last 1 to 2 weeks of unclear etiology.  He was being evaluated for C. difficile by his PCP and appears he had appropriately brought the study to the emergency department.  The diarrhea has improved as of recent he is down to 1-2 bowel movements a day.  He does maintain good appetite and ability to tolerate p.o. diet.  Denies any current chest pain/tightness.  Additionally denies any recent concerning bleeding (no conjunctival hemorrhage, petechiae, hemoptysis, hematemesis, hematuria, BRBPR/melena).    Hospital Course (5/7-***)    #elevated INR  Patient received 5 mg vitamin K IV at OSH, repeat coag on arrival was 2.3. Consulted hematology, ***      #thoracic " aneurysm  Incidentally seen, outpatient vascular surgery referral placed    #Lung nodule  ***    DC to-dos  [ ] PCP follow up for resuming Xarelto when INR normalized (or at least < 2)  [  ] vasc surg referral for aneursym  [  ] pulm referral for ebus  [  ] AC plan  [  ] gi folow up for emptying study?

## 2025-05-07 NOTE — CARE PLAN
The patient's goals for the shift include      The clinical goals for the shift include   Problem: Pain - Adult  Goal: Verbalizes/displays adequate comfort level or baseline comfort level  Outcome: Progressing     Problem: Safety - Adult  Goal: Free from fall injury  Outcome: Progressing     Problem: Discharge Planning  Goal: Discharge to home or other facility with appropriate resources  Outcome: Progressing     Problem: Chronic Conditions and Co-morbidities  Goal: Patient's chronic conditions and co-morbidity symptoms are monitored and maintained or improved  Outcome: Progressing     Problem: Nutrition  Goal: Nutrient intake appropriate for maintaining nutritional needs  Outcome: Progressing     Problem: Fall/Injury  Goal: Not fall by end of shift  Outcome: Progressing  Goal: Be free from injury by end of the shift  Outcome: Progressing  Goal: Verbalize understanding of personal risk factors for fall in the hospital  Outcome: Progressing  Goal: Verbalize understanding of risk factor reduction measures to prevent injury from fall in the home  Outcome: Progressing  Goal: Use assistive devices by end of the shift  Outcome: Progressing  Goal: Pace activities to prevent fatigue by end of the shift  Outcome: Progressing     Problem: Respiratory  Goal: Clear secretions with interventions this shift  Outcome: Progressing  Goal: Minimize anxiety/maximize coping throughout shift  Outcome: Progressing  Goal: Minimal/no exertional discomfort or dyspnea this shift  Outcome: Progressing  Goal: No signs of respiratory distress (eg. Use of accessory muscles. Peds grunting)  Outcome: Progressing  Goal: Patent airway maintained this shift  Outcome: Progressing  Goal: Tolerate mechanical ventilation evidenced by VS/agitation level this shift  Outcome: Progressing  Goal: Tolerate pulmonary toileting this shift  Outcome: Progressing  Goal: Verbalize decreased shortness of breath this shift  Outcome: Progressing  Goal: Wean oxygen to  maintain O2 saturation per order/standard this shift  Outcome: Progressing  Goal: Increase self care and/or family involvement in next 24 hours  Outcome: Progressing     Problem: Skin  Goal: Decreased wound size/increased tissue granulation at next dressing change  Outcome: Progressing  Goal: Participates in plan/prevention/treatment measures  Outcome: Progressing  Flowsheets (Taken 5/7/2025 1207)  Participates in plan/prevention/treatment measures: Discuss with provider PT/OT consult  Goal: Prevent/manage excess moisture  Outcome: Progressing  Flowsheets (Taken 5/7/2025 1207)  Prevent/manage excess moisture: Moisturize dry skin  Goal: Prevent/minimize sheer/friction injuries  Outcome: Progressing  Flowsheets (Taken 5/7/2025 1207)  Prevent/minimize sheer/friction injuries: Increase activity/out of bed for meals  Goal: Promote/optimize nutrition  Outcome: Progressing  Flowsheets (Taken 5/7/2025 1207)  Promote/optimize nutrition:   Consume > 50% meals/supplements   Offer water/supplements/favorite foods  Goal: Promote skin healing  Outcome: Progressing  Flowsheets (Taken 5/7/2025 1207)  Promote skin healing: Assess skin/pad under line(s)/device(s)

## 2025-05-07 NOTE — PROGRESS NOTES
"Naveen Arauz \"Marvin" is a 80 y.o. male on day 0 of admission presenting with Multifocal pneumonia.      Subjective   -no acute events overnight, full HPI in Dr. Hernandez's H&P  -this morning, patient feels well, no acute concerns  -does endorse dyspnea when climbing stairs, no chest pain, dizziness, or leg swelling. Is planning to see his cardiologist in a few weeks  -No bleeding, new bruising, hematuria, melena, hematemesis,          Objective     Last Recorded Vitals  /74 (BP Location: Right arm, Patient Position: Lying)   Pulse 66   Temp 36.8 °C (98.2 °F) (Temporal)   Resp 18   Wt 67.6 kg (149 lb 0.5 oz)   SpO2 97%   Intake/Output last 3 Shifts:    Intake/Output Summary (Last 24 hours) at 5/7/2025 0712  Last data filed at 5/7/2025 0119  Gross per 24 hour   Intake 0 ml   Output 0 ml   Net 0 ml     Admission Weight  Weight: 67.6 kg (149 lb 0.5 oz) (05/07/25 0119)    Daily Weight  05/07/25 : 67.6 kg (149 lb 0.5 oz)    Image Results  CT chest abdomen pelvis wo IV contrast  Narrative: STUDY:  CT Chest, Abdomen, and Pelvis without IV Contrast; 5/6/25 at 12:01 PM  INDICATION:  Pneumonia.  Diarrhea.  COMPARISON:  Chest XR 5/2/25, CT AP 1/5/25.  ACCESSION NUMBER(S):  [id number]  ORDERING CLINICIAN:  RAPHAEL CARABALLO  TECHNIQUE:  CT of the chest, abdomen, and pelvis was performed.  Contiguous axial  images were obtained at 3 mm slice thickness through the chest,  abdomen, and pelvis.  Coronal and sagittal reconstructions at 3 mm  slice thickness were performed.  No intravenous contrast was  administered.     FINDINGS:  Please note that the evaluation of vessels, lymph nodes and organs is  limited without intravenous contrast.   CHEST:   There is pleural-parenchymal scarring at the lung apices.  MEDIASTINUM:  The heart is normal in size without pericardial effusion.  Moderate  atherosclerotic disease of the coronary arteries involving the RCA,  LAD and left circumflex.  There is aneurysmal dilatation of " the  ascending thoracic aorta measuring up to 4.3 cm.  Series #201 slice  52.  LUNGS/PLEURA:  There is no pleural effusion, pleural thickening, or pneumothorax.   The airways are patent.  There is pleural-parenchymal scarring at the lung apices.  Scattered  groundglass opacities noted within the anterior left upper lobe and  left lower lobe concerning for pneumonia.  Additional patchy opacities  demonstrated within the anterior right upper lobe.  Series #201 slice  56.  There are ill-defined nodular and groundglass opacities within  the posterior left lower lung.  Series #201 slice 79  There is an irregular nodular density of 1.3 cm within the left  midlung field concerning for a primary lung malignancy.  Series #201  slice 58 and series #202 slice 38.   LYMPH NODES:  Thoracic lymph nodes are not enlarged.  ABDOMEN:     LIVER:  No hepatomegaly.  Smooth surface contour.  Normal attenuation.     BILE DUCTS:  No intrahepatic or extrahepatic biliary ductal dilatation.     GALLBLADDER:  The gallbladder is distended with multiple cholesterol stones within  the dependent portion.  No abnormal gallbladder wall thickening or  pericholecystic fluid to suggest acute cholecystitis.  STOMACH:  No gastric mass, or abnormal wall thickening.       PANCREAS:  Pancreas is mildly atrophic.  No masses or ductal dilatation.     SPLEEN:  No splenomegaly or focal splenic lesion.     ADRENAL GLANDS:  No thickening or nodules.     KIDNEYS AND URETERS:  Kidneys are normal in size and location.  Nonobstructing left midpole  stone measuring 0.26 cm.       PELVIS:     BLADDER:  No abnormalities identified.     REPRODUCTIVE ORGANS:  Prostate is normal in size measuring 2.4 x 4.0 cm.     BOWEL:  No abnormalities identified.     VESSELS:  No abnormalities identified.  Mild atherosclerotic disease of the  abdominal aorta.  No aneurysm.        PERITONEUM/RETROPERITONEUM/LYMPH NODES:  No free fluid.  No pneumoperitoneum.  No lymphadenopathy.      ABDOMINAL WALL:  No abnormalities identified.  SOFT TISSUES:   No abnormalities identified.     BONES:  No acute fracture or aggressive osseous lesion.  Mild osteopenia.   There are compression fractures involving the upper L1 and L2  vertebral bodies which a 20 -25% loss of the vertebral body height.   Findings are indeterminate but new, when compared with the prior study  dated 01/05/2025.  Moderate to severe narrowing of the intervertebral  disc spaces involving L4-5, and L5-S1.  Impression: 1. 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  2. Scattered groundglass opacities noted within the anterior left  upper lobe and left lower lobe, anterior right upper lobe with  ill-defined nodular and groundglass opacities within the posterior  left lower lung compatible with multifocal pneumonia.  3. There is an irregular nodular density of 1.3 cm within the left  midlung field concerning for a primary lung malignancy.  PET/CT  imaging suggested for further evaluation.  4. Cholelithiasis without evidence of acute cholecystitis.  5. Nonobstructing left midpole stone measuring 0.26 cm.  6. Compression fractures involving the upper L1 and L2 vertebral  bodies which a 20-25% loss of the vertebral body height. Findings are  indeterminate but new, when compared with the prior study dated  01/05/2025.  Signed by Andrea Javier MD  ECG 12 lead   Poor data quality, interpretation may be adversely affected  Sinus rhythm with Premature atrial complexes  Otherwise normal ECG  When compared with ECG of 21-MAR-2025 10:37,  Premature ventricular complexes are no longer Present  Premature atrial complexes are now Present  SD interval has decreased    Physical Exam  Constitutional:       General: He is not in acute distress.     Appearance: Normal appearance. He is not ill-appearing, toxic-appearing or diaphoretic.   HENT:      Nose: No congestion or rhinorrhea.      Mouth/Throat:      Mouth:  Mucous membranes are moist.   Eyes:      Extraocular Movements: Extraocular movements intact.   Cardiovascular:      Rate and Rhythm: Normal rate and regular rhythm.      Pulses: Normal pulses.      Heart sounds: Normal heart sounds.   Pulmonary:      Effort: Pulmonary effort is normal. No respiratory distress.      Breath sounds: Normal breath sounds. No wheezing or rhonchi. Rales: mild bilateral rales.  Chest:      Chest wall: No tenderness.   Abdominal:      General: Abdomen is flat. Bowel sounds are normal. There is no distension.      Palpations: Abdomen is soft.      Tenderness: There is no abdominal tenderness.   Musculoskeletal:      Right lower leg: No edema.      Left lower leg: No edema.   Lymphadenopathy:      Cervical: No cervical adenopathy.   Skin:     General: Skin is warm and dry.   Neurological:      General: No focal deficit present.      Mental Status: He is alert and oriented to person, place, and time.     Relevant Results  Results for orders placed or performed during the hospital encounter of 05/07/25 (from the past 24 hours)   Streptococcus pneumoniae Antigen, Urine    Specimen: Clean Catch/Voided; Urine   Result Value Ref Range    Streptococcus pneumoniae Ag, Urine Negative Negative   Legionella Antigen, Urine    Specimen: Clean Catch/Voided; Urine   Result Value Ref Range    L. pneumophila Urine Ag Negative Negative   CBC and Auto Differential   Result Value Ref Range    WBC 5.9 4.4 - 11.3 x10*3/uL    nRBC 0.0 0.0 - 0.0 /100 WBCs    RBC 3.60 (L) 4.50 - 5.90 x10*6/uL    Hemoglobin 11.6 (L) 13.5 - 17.5 g/dL    Hematocrit 36.4 (L) 41.0 - 52.0 %     (H) 80 - 100 fL    MCH 32.2 26.0 - 34.0 pg    MCHC 31.9 (L) 32.0 - 36.0 g/dL    RDW 13.7 11.5 - 14.5 %    Platelets 253 150 - 450 x10*3/uL    Immature Granulocytes %, Automated 0.3 0.0 - 0.9 %    Immature Granulocytes Absolute, Automated 0.02 0.00 - 0.50 x10*3/uL   Magnesium   Result Value Ref Range    Magnesium 1.74 1.60 - 2.40 mg/dL    Hepatic Function Panel   Result Value Ref Range    Albumin 2.7 (L) 3.4 - 5.0 g/dL    Bilirubin, Total 0.5 0.0 - 1.2 mg/dL    Bilirubin, Direct 0.1 0.0 - 0.3 mg/dL    Alkaline Phosphatase 89 33 - 136 U/L    ALT 7 (L) 10 - 52 U/L    AST 13 9 - 39 U/L    Total Protein 4.9 (L) 6.4 - 8.2 g/dL   Type And Screen   Result Value Ref Range    ABO TYPE O     Rh TYPE NEG     ANTIBODY SCREEN NEG    Coagulation Screen   Result Value Ref Range    Protime 25.3 (H) 9.8 - 12.4 seconds    INR 2.3 (H) 0.9 - 1.1    aPTT 39 (H) 26 - 36 seconds   Phosphorus   Result Value Ref Range    Phosphorus 2.9 2.5 - 4.9 mg/dL   Basic Metabolic Panel   Result Value Ref Range    Glucose 89 74 - 99 mg/dL    Sodium 137 136 - 145 mmol/L    Potassium 4.0 3.5 - 5.3 mmol/L    Chloride 107 98 - 107 mmol/L    Bicarbonate 21 21 - 32 mmol/L    Anion Gap 13 10 - 20 mmol/L    Urea Nitrogen 14 6 - 23 mg/dL    Creatinine 0.81 0.50 - 1.30 mg/dL    eGFR 90 >60 mL/min/1.73m*2    Calcium 8.1 (L) 8.6 - 10.6 mg/dL   Lactate dehydrogenase   Result Value Ref Range     84 - 246 U/L   Sars-CoV-2 PCR   Result Value Ref Range    Coronavirus 2019, PCR Not Detected Not Detected   Influenza A, and B PCR   Result Value Ref Range    Flu A Result Not Detected Not Detected    Flu B Result Not Detected Not Detected   RSV PCR   Result Value Ref Range    RSV PCR Not Detected Not Detected        Assessment/Plan   Naveen Arauz is a 80 y.o. male with afix (on xarelto), CAD s/p PCI, PUD s/p partial gastrectomy/duodenectomy, hx c diff colitis, hx epidural hematoma s/p C6 laminectomy, recent PNA here for elevated INR. Patient is stable and doing well this morning. Patient with chronic weight loss, likely multifactorial with known esophageal dysphagia requiring dilation limiting some PO, chronic diarrhea after gastrectomy. Appreciate heme recs for AC thoughts, can likely dc patient home afterwards with close follow up, return precautions.    #Elevated INR/PT  :: INR >10 at OSH ED,  got 5 mg IV vitamin K with repeat INR 2.3 here  :: CT CAP 5/6 with no bleeding  -holding xarelto  -hematology consulted, appreciate recs  -AM CBC stable  -giving additional 5 mg PO vitamin K     #Multifocal PNA  #COPD? (Baseline RA at home)  PFTs are not avaible in EMR  Recently found to have CAP and RSV (1/5/25); completed 5 day course Ceft/Azithro  Noted CT Chest bilateral GGOs but appears to have improved and patient without any systemic S/S  -stop antibiotics  -fu Blood cultures  -Viral panel  -Strep/Legion negative   -Procal 0.05  -Albuterol PRN     #Diarrhea  Hx of C diff colitis   -C Diff  -stool studies  -CTM     #Pulmonary nodule  :: 1.3 cm nodule on CT 5/6 (interval increase compared to CT PE 1/5/25)  :: previously underwent EBUS, mediastinal biopsy in 2016. No malignancy at that time.  - outpatient pulm referral for monitoring     #Dilated Thoracic Aorta 4.3cm  Asymptomatic, incidental finding  -SBP goal preferably <130, could monitor serially q6mo > q12mo  -outpatient vascular surgery referral     #Afib  -c/w home Xeralto once safe to do so     #Prior TIA  -c/w ASA once safe to do so     #Hx of duodenal ulcer as well as chronic ulcer  #GERD  ::S/p removal of most of his stomach and duodenum.  -We will continue ranitidine  -Substitute Dexilant with formulary Pantoprazole     #Hypertension  -CTM     #HLD  -c/h statin    F: prn  E: prn  N: Regular diet  A: PIV x1    DVT ppx: holding AC  GI ppx: home PPI  Pain regimen: tylenol, home morphine/flexeril, dilaudid for breakthrough  Bowel regimen: holding I/s/o diarhhea  Abx: none  O2: RA  Code Status: Full Code  Emergency contact: Karla Arauz (spouse) [phone]    Florencio Rangel MD  PGY-1, Internal Medicine-Pediatrics

## 2025-05-07 NOTE — CONSULTS
Name: Naveen Arauz  MRN: [id number]  Encounter Date: 5/7/2025  PCP: Armen Spangler DO    Reason for consult: elevated INR  Attending Provider: Romel Love    Hematology/ Oncology Consult Note      History of Present Illness   Naveen Arauz is a 80 y.o. male with PMH of pAF (on xarelto), CAD s/p PCI, HTN/HLD and PUD s/p partial gastrectomy/duodenectomy who presented to OSH with ongoing diarrhea. Imaging concerning for multifocal PNA and he was started on Abx. Labs were concerning for elevated PT/PTT to >100/56 with an INR > 10. He has not had any episodes of bleeding. He was given one dose of IV vitamin K 5mg yesterday evening at 7PM. PT and INR have decreased to 25.3 and 2.3. PTT has also decreased to 39.     Of note patient was admitted in January after a fall with a large epidural hematoma fro craniocervical junction to thoracic spine causing severe cord compression. At that time INR was elevated to 2.6. He was given Kcentra and vitamin K.     On evaluation patient reports poor nutritional intake to to difficulty swallowing and esophageal issues in the past. He has not endorsed any current bleeding episodes       Past Medical history   Medical History[1]      Past Surgical History   Surgical History[2]      Family History    Family History[3]      Social History   Social History[4]      Allergies   Allergies[5]    Medications   [Held by provider] aspirin, 81 mg, Daily  atorvastatin, 40 mg, Nightly  [Held by provider] azithromycin, 500 mg, q24h  [Held by provider] cefTRIAXone, 1 g, q24h  metoprolol succinate XL, 50 mg, Daily  morphine CR, 30 mg, q12h VIET  pantoprazole, 40 mg, Daily before breakfast  phytonadione, 5 mg, Once  [Held by provider] rivaroxaban, 20 mg, Daily         albuterol, 2 puff, 4x daily PRN  cyclobenzaprine, 5 mg, Nightly PRN  HYDROmorphone, 0.4 mg, q4h PRN  naloxone, 0.4 mg, q5 min PRN  polyethylene glycol, 17 g, Daily PRN        Review of Systems   Review of Systems   10 pt ROS reviewed and  "negative aside from above    Physical Exam   Blood pressure 141/74, pulse 66, temperature 36.8 °C (98.2 °F), temperature source Temporal, resp. rate 18, height 1.829 m (6' 0.01\"), weight 67.6 kg (149 lb 0.5 oz), SpO2 97%.    Gen: awake, alert, in no acute distress  HEENT: AT/NC, PEERL, EOMI  CV: RRR, no m/r/g  Pulm: normal work of breathing  Abd: soft, NT/ND  Ext: no LE edema  Skin: warm and dry  Neuro: A&Ox4, moves all 4 extremities spontaneously     Labs     Lab Results   Component Value Date    GLUCOSE 89 05/07/2025    CALCIUM 8.1 (L) 05/07/2025     05/07/2025    K 4.0 05/07/2025    CO2 21 05/07/2025     05/07/2025    BUN 14 05/07/2025    CREATININE 0.81 05/07/2025       Lab Results   Component Value Date    WBC 5.9 05/07/2025    HGB 11.6 (L) 05/07/2025    HCT 36.4 (L) 05/07/2025     (H) 05/07/2025     05/07/2025       Lab Results   Component Value Date    ALT 7 (L) 05/07/2025    AST 13 05/07/2025    ALKPHOS 89 05/07/2025    BILITOT 0.5 05/07/2025       Imaging   CT Chest, Abdomen, and Pelvis without IV Contrast; 5/6/25 at 12:01 PM   IMPRESSION:  1. 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  2. Scattered groundglass opacities noted within the anterior left  upper lobe and left lower lobe, anterior right upper lobe with  ill-defined nodular and groundglass opacities within the posterior  left lower lung compatible with multifocal pneumonia.  3. There is an irregular nodular density of 1.3 cm within the left  midlung field concerning for a primary lung malignancy.  PET/CT  imaging suggested for further evaluation.  4. Cholelithiasis without evidence of acute cholecystitis.  5. Nonobstructing left midpole stone measuring 0.26 cm.  6. Compression fractures involving the upper L1 and L2 vertebral  bodies which a 20-25% loss of the vertebral body height. Findings are  indeterminate but new, when compared with the prior study " dated  01/05/2025.    Assessment/Plan     Naveen Arauz is a 80 y.o. male w/ a past medical history of pAF (on xarelto), CAD s/p PCI, HTN/HLD and PUD s/p partial gastrectomy/duodenectomy who presented to OSH with ongoing diarrhea pending further workup. Lab workup was notable for significantly elevated PT/INR with moderate elevation in PTT. He received one dose of IV vitamin K 5mg with decline in PT/INR.     Coagulopathy is most likely due to xarelto use and vitamin K deficiency - patient report of poor PO intake due to dysphagia.     Recommendations  -can PO vitamin K 5 mg today  -at time of discharge patient should be put on vitamin K supplementation 1 mg daily until nutritional issues are resolved  -would engage GI due to dysphagia      Thank you for this consult, we will continue to follow. Patient seen and discussed with attending physician, Dr. Mcneal, who agrees with the above.     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)     Coronary artery disease     Gout     Hyperlipidemia     Hypertension     Transient cerebral ischemic attack, unspecified     TIA (transient ischemic attack)   [2]   Past Surgical History:  Procedure Laterality Date    ANTERIOR CERVICAL DISCECTOMY W/ FUSION  03/21/2025    C5-6 anterior cervical discectomy fusion, with cervical traction (Neck)    CARDIAC CATHETERIZATION N/A 11/17/2023    Procedure: Left Heart Cath;  Surgeon: Manuel Blackwell MD;  Location: [facility];  Service: Cardiovascular;  Laterality: N/A;    MR HEAD ANGIO WO IV CONTRAST  11/04/2018    MR HEAD ANGIO WO IV CONTRAST 11/4/2018 [facility]    MR NECK ANGIO WO IV CONTRAST  11/04/2018    MR NECK ANGIO WO IV CONTRAST 11/4/2018 [facility]    OTHER SURGICAL HISTORY      cervical  fusion    OTHER SURGICAL HISTORY Bilateral     Knee surgery    OTHER SURGICAL HISTORY      Cardiac catheterization with stent placement    OTHER SURGICAL HISTORY      Stomach surgery    OTHER SURGICAL HISTORY      Back surgery   [3]   Family History  Problem Relation Name Age of Onset    Heart attack Mother      Heart attack Father      Heart attack Other Aunt    [4]   Social History  Socioeconomic History    Marital status:    Tobacco Use    Smoking status: Former     Current packs/day: 0.00     Types: Cigarettes     Quit date:      Years since quittin.3    Smokeless tobacco: Never   Vaping Use    Vaping status: Never Used   Substance and Sexual Activity    Alcohol use: Yes     Comment: Occasional    Drug use: Never    Sexual activity: Defer     Social Drivers of Health     Financial Resource Strain: Low Risk  (2025)    Overall Financial Resource Strain (CARDIA)     Difficulty of Paying Living Expenses: Not very hard   Food Insecurity: No Food Insecurity (2025)    Hunger Vital Sign     Worried About Running Out of Food in the Last Year: Never true     Ran Out of Food in the Last Year: Never true   Transportation Needs: No Transportation Needs (2025)    PRAPARE - Transportation     Lack of Transportation (Medical): No     Lack of Transportation (Non-Medical): No   Physical Activity: Sufficiently Active (2025)    Exercise Vital Sign     Days of Exercise per Week: 5 days     Minutes of Exercise per Session: 30 min   Intimate Partner Violence: Not At Risk (2025)    Humiliation, Afraid, Rape, and Kick questionnaire     Fear of Current or Ex-Partner: No     Emotionally Abused: No     Physically Abused: No     Sexually Abused: No   Housing Stability: Low Risk  (2025)    Housing Stability Vital Sign     Unable to Pay for Housing in the Last Year: No     Number of Times Moved in the Last Year: 0     Homeless in the Last Year: No   [5]   Allergies  Allergen Reactions    Nsaids  (Non-Steroidal Anti-Inflammatory Drug) GI Upset and Nausea/vomiting     No NSAIDs due to stomach surgeries

## 2025-05-07 NOTE — PROGRESS NOTES
Transitional Care Coordination Progress Note:  Patient was discussed during interdisciplinary rounds.  Team members present: medical team, and TCC.  Plan per medical team: Pt admitted with mutlifocal PNA and elevated INR, hematology consulted. Plan to monitor labs and follow up cxs.  Payer: Medicare A/B, MMO Supplement.  Status: Inpatient.  Discharge disposition: Attempted to meet with pt but he was not available. Anticipate discharge home with outpatient follow up.  Potential barriers: None.  ADOD: 5/8.  Care coordinator will continue to follow for discharge planning needs.    Shanna Phelps MSN, RN-BC  Transitional Care Coordinator (TCC)  [phone]

## 2025-05-07 NOTE — CARE PLAN
The patient's goals for the shift include      The clinical goals for the shift include patient will remain safe and free from falls and/or injury throughout the duration of this shift.      Problem: Pain - Adult  Goal: Verbalizes/displays adequate comfort level or baseline comfort level  Outcome: Progressing     Problem: Safety - Adult  Goal: Free from fall injury  Outcome: Progressing     Problem: Discharge Planning  Goal: Discharge to home or other facility with appropriate resources  Outcome: Progressing     Problem: Chronic Conditions and Co-morbidities  Goal: Patient's chronic conditions and co-morbidity symptoms are monitored and maintained or improved  Outcome: Progressing     Problem: Nutrition  Goal: Nutrient intake appropriate for maintaining nutritional needs  Outcome: Progressing

## 2025-05-08 LAB — RHINOVIRUS RNA UPPER RESP QL NAA+PROBE: NOT DETECTED

## 2025-05-08 NOTE — DISCHARGE SUMMARY
"Discharge Diagnosis  Coagulopathy (Multi)     Issues Requiring Follow-Up  [ ] PCP follow up for resuming Xarelto when INR normalized (or at least < 2), labs set for next week. Patient started on oral vitamin K for presumed nutritional deficiency. Anti Xa level pending  [  ] vasc surg referral for aneursym monitoring  [  ] pulm referral for nodule assessment, possible repeat EBUS  [  ] pending blood cultures, low suspicion for infection    Discharge Meds     Medication List      START taking these medications     phytonadione 5 mg tablet; Commonly known as: Vitamin K; Take 0.5 tablets   (2.5 mg) by mouth every other day.     CONTINUE taking these medications     aspirin 81 mg EC tablet   atorvastatin 40 mg tablet; Commonly known as: Lipitor; TAKE 1 TABLET BY   MOUTH EVERY NIGHT AT BEDTIME   cyclobenzaprine 5 mg tablet; Commonly known as: Flexeril; Take 1 tablet   (5 mg) by mouth as needed at bedtime for muscle spasms.   Dexilant 60 mg DR capsule; Generic drug: dexlansoprazole   metoprolol succinate  mg 24 hr tablet; Commonly known as:   Toprol-XL   morphine CR 30 mg 12 hr tablet; Commonly known as: MS Contin   naloxone 4 mg/0.1 mL nasal spray; Commonly known as: Narcan; Administer   1 spray (4 mg) into affected nostril(s) if needed for opioid reversal. May   repeat every 2-3 minutes if needed, alternating nostrils, until medical   assistance becomes available.   rivaroxaban 20 mg tablet; Commonly known as: Xarelto; Take 1 tablet (20   mg) by mouth once daily. Do not fill before March 28, 2025.   tiZANidine 4 mg tablet; Commonly known as: Zanaflex; Take 1 tablet (4   mg) by mouth every 6 hours if needed for muscle spasms.   Ventolin HFA 90 mcg/actuation inhaler; Generic drug: albuterol       Test Results Pending At Discharge  Pending Labs       Order Current Status    Factor Xa,Arixtra In process    Rhinovirus PCR, Respiratory Spec In process            Hospital Course  Naveen Valdesr \"Rick\" is a 80 y.o. male " with PMHX notable for Afib (Xeralto), C diff colitis, Peptic ulcer disease s/p partial gastrectomy, Lyme disease, HTN, HLD, GERD, and CAD s/p PCI to RCA.      Originally presented to outside hospital hoping to drop off his C Diff study ordered by his PCP with complaints of diarrhea for the last 1 to 2 weeks.  He reported at that time that he had a pneumonia previously and had recently completed antibiotics.  CT chest abdomen pelvis was ordered revealing Aneurysmal dilation of the thoracic aorta (4.3 cm), bilateral GGO's concerning for multifocal pneumonia, nonobstructive left kidney stone (0.26 cm), and irregular nodular density (1.3 cm) left midlung field concerning for malignancy.  Additional lab work also revealed an elevated pro time (>100) and an INR >10.  Lab work otherwise unremarkable, hemoglobin stable/improved at 13.3 baseline (12), troponin of 13, lipase of 7.  For this he was started on ceftriaxone azithromycin for community-acquired pneumonia.     On interview he reports that his respiratory symptoms have continued improved even after completing his antibiotics.  Declines any recent fevers/chills, productive cough, night sweats, sick contacts, nausea vomiting.  Does endorse diarrhea for the last 1 to 2 weeks of unclear etiology.  He was being evaluated for C. difficile by his PCP and appears he had appropriately brought the study to the emergency department.  The diarrhea has improved as of recent he is down to 1-2 bowel movements a day.  He does maintain good appetite and ability to tolerate p.o. diet.  Denies any current chest pain/tightness.  Additionally denies any recent concerning bleeding (no conjunctival hemorrhage, petechiae, hemoptysis, hematemesis, hematuria, BRBPR/melena).    Hospital Course (5/7)    #elevated INR  Patient received 5 mg vitamin K IV at OSH, repeat coag on arrival was 2.3. Consulted hematology, who recommended oral vitamin K supplementation for likely nutritional etiology on  top of DOAC use, plan for repeat labs, PCP follow up    #thoracic aneurysm  Incidentally seen, outpatient vascular surgery referral placed    #Lung nodule  Previously biopsied and non-malignant ~10 years ago, given interval increase placed outpatient pulm referral    Pertinent Physical Exam At Time of Discharge  Constitutional:       General: He is not in acute distress.     Appearance: Normal appearance. He is not ill-appearing, toxic-appearing or diaphoretic.   HENT:      Nose: No congestion or rhinorrhea.      Mouth/Throat:      Mouth: Mucous membranes are moist.   Eyes:      Extraocular Movements: Extraocular movements intact.   Cardiovascular:      Rate and Rhythm: Normal rate and regular rhythm.      Pulses: Normal pulses.      Heart sounds: Normal heart sounds.   Pulmonary:      Effort: Pulmonary effort is normal. No respiratory distress.      Breath sounds: Normal breath sounds. No wheezing or rhonchi. Rales: mild bilateral rales.  Chest:      Chest wall: No tenderness.   Abdominal:      General: Abdomen is flat. Bowel sounds are normal. There is no distension.      Palpations: Abdomen is soft.      Tenderness: There is no abdominal tenderness.   Musculoskeletal:      Right lower leg: No edema.      Left lower leg: No edema.   Lymphadenopathy:      Cervical: No cervical adenopathy.   Skin:     General: Skin is warm and dry.   Neurological:      General: No focal deficit present.      Mental Status: He is alert and oriented to person, place, and time.     Outpatient Follow-Up  Future Appointments   Date Time Provider Department Center   6/26/2025 11:30 AM Ramiro Perez MD PhD [id number] [place]   10/8/2025 11:00 AM Elaine Welch APRN-CNP [id number] [facility]   11/25/2025  2:00 PM Martinez Bourne APRN-CNP [id number] [facility]       Florencio Rangel MD

## 2025-05-09 ENCOUNTER — TELEPHONE (OUTPATIENT)
Dept: RADIOLOGY | Facility: HOSPITAL | Age: 80
End: 2025-05-09
Payer: MEDICARE

## 2025-05-09 LAB
ATRIAL RATE: 76 BPM
P AXIS: 84 DEGREES
P OFFSET: 161 MS
P ONSET: 123 MS
PR INTERVAL: 196 MS
Q ONSET: 221 MS
QRS COUNT: 12 BEATS
QRS DURATION: 92 MS
QT INTERVAL: 402 MS
QTC CALCULATION(BAZETT): 452 MS
QTC FREDERICIA: 434 MS
R AXIS: 82 DEGREES
T AXIS: 68 DEGREES
T OFFSET: 422 MS
VENTRICULAR RATE: 76 BPM

## 2025-05-09 NOTE — TELEPHONE ENCOUNTER
"5/9/25 0750  Patient found on imaging from 5/6/25 to have \"an irregular nodular density of 1.3 cm within the left midlung field concerning for a primary lung malignancy\". Patient had EBUS in 2016 and it was benign.  He quit smoking in 1973. The inpatient team is referring him to IP for repeat EBUS versus OP pulm follow up. Spoke with interventional pulm who felt that the patient could see pulm as an OP. SARAH Rapp  "

## 2025-05-10 LAB
BACTERIA BLD CULT: NORMAL
BACTERIA BLD CULT: NORMAL

## 2025-05-11 LAB
BACTERIA BLD CULT: NORMAL
BACTERIA BLD CULT: NORMAL

## 2025-05-12 DIAGNOSIS — D68.9 COAGULOPATHY (MULTI): ICD-10-CM

## 2025-05-14 LAB — FONDAPARINUX PPP CHRO-MCNC: 0.86 MG/L

## 2025-05-21 ENCOUNTER — OFFICE VISIT (OUTPATIENT)
Dept: PULMONOLOGY | Facility: CLINIC | Age: 80
End: 2025-05-21
Payer: MEDICARE

## 2025-05-21 VITALS
SYSTOLIC BLOOD PRESSURE: 142 MMHG | RESPIRATION RATE: 16 BRPM | OXYGEN SATURATION: 97 % | DIASTOLIC BLOOD PRESSURE: 77 MMHG | WEIGHT: 152.9 LBS | BODY MASS INDEX: 20.73 KG/M2 | HEART RATE: 69 BPM

## 2025-05-21 DIAGNOSIS — R05.9 COUGH, UNSPECIFIED TYPE: ICD-10-CM

## 2025-05-21 DIAGNOSIS — R91.1 LUNG NODULE: ICD-10-CM

## 2025-05-21 DIAGNOSIS — R06.09 DYSPNEA ON EXERTION: Primary | ICD-10-CM

## 2025-05-21 PROCEDURE — 99214 OFFICE O/P EST MOD 30 MIN: CPT | Performed by: INTERNAL MEDICINE

## 2025-05-21 PROCEDURE — 1125F AMNT PAIN NOTED PAIN PRSNT: CPT | Performed by: INTERNAL MEDICINE

## 2025-05-21 PROCEDURE — 1036F TOBACCO NON-USER: CPT | Performed by: INTERNAL MEDICINE

## 2025-05-21 PROCEDURE — 3078F DIAST BP <80 MM HG: CPT | Performed by: INTERNAL MEDICINE

## 2025-05-21 PROCEDURE — 1111F DSCHRG MED/CURRENT MED MERGE: CPT | Performed by: INTERNAL MEDICINE

## 2025-05-21 PROCEDURE — 99204 OFFICE O/P NEW MOD 45 MIN: CPT | Performed by: INTERNAL MEDICINE

## 2025-05-21 PROCEDURE — 1159F MED LIST DOCD IN RCRD: CPT | Performed by: INTERNAL MEDICINE

## 2025-05-21 PROCEDURE — 3077F SYST BP >= 140 MM HG: CPT | Performed by: INTERNAL MEDICINE

## 2025-05-21 ASSESSMENT — PATIENT HEALTH QUESTIONNAIRE - PHQ9
2. FEELING DOWN, DEPRESSED OR HOPELESS: NOT AT ALL
SUM OF ALL RESPONSES TO PHQ9 QUESTIONS 1 AND 2: 0
1. LITTLE INTEREST OR PLEASURE IN DOING THINGS: NOT AT ALL

## 2025-05-21 ASSESSMENT — PAIN SCALES - GENERAL: PAINLEVEL_OUTOF10: 4

## 2025-05-21 ASSESSMENT — ENCOUNTER SYMPTOMS: DEPRESSION: 0

## 2025-05-21 NOTE — PROGRESS NOTES
Department of Medicine  Division of Pulmonary, Critical Care, and Sleep Medicine  Consultation  [facility] - [address], [address]       Physician NILDA (5/21/2025):   80-year-old man with history of gastric surgery long time ago for ulcer, known aspiration risk/esophageal strictures post dilation, presenting for lung nodule and dyspnea.  Patient had spinal surgery this year, treated for pneumonia and RSV in January, earlier this month also treated for pneumonia, his CT showed multiple groundglass infiltrates with left upper lobe dense nodule.   Feels better since then but still with dyspnea on exertion which seems to be new since his surgery and recurrent admission.  Denied any productive cough currently, no fever or chills, no wheezing, still with dyspnea mainly going up stairs.   Did report coughing with thin fluid.  All other systems reviewed and negative otherwise.    Quit smoking 1970s.    Immunization History   Administered Date(s) Administered    Flu vaccine, trivalent, preservative free, HIGH-DOSE, age 65y+ (Fluzone) 12/09/2019, 10/30/2020, 10/20/2022    Influenza Whole 02/18/2014    Moderna COVID-19 vaccine, bivalent, blue cap/gray label *Check age/dose* 12/06/2022    Moderna SARS-CoV-2 Vaccination 03/03/2021, 03/31/2021, 12/09/2021    Tdap vaccine, age 7 year and older (BOOSTRIX, ADACEL) 08/04/2019       Medical History[1]    Surgical History[2]    Family History[3]    Social History[4]    Occupational & Environmental History:        Current Medications:  Current Outpatient Medications   Medication Instructions    albuterol (Ventolin HFA) 90 mcg/actuation inhaler 2 puffs, 4 times daily PRN    aspirin 81 mg EC tablet 1 tablet, Daily    atorvastatin (LIPITOR) 40 mg, oral, Nightly    cyclobenzaprine (FLEXERIL) 5 mg, oral, Nightly PRN    dexlansoprazole (Dexilant) 60 mg DR capsule 1 capsule, Daily    metoprolol succinate XL (Toprol-XL) 100 mg 24 hr tablet 0.5 tablets, Daily    morphine CR (MS  "Contin) 30 mg 12 hr tablet TAKE ONE TABLET BY MOUTH EVERY 8 HOURS FOR PAIN -TAKE 1 TABLET TWICE A DAY SCHEDULED AND MAY TAKE ONE EXTRA DOSE OF MORPHINE SA IN THE AFTERNOON IF NEEDED FOR PAIN -TAKE 1 TABLET TWICE A DAY SCHEDULED AND MAY TAKE ONE EXTRA DOSE OF MORPHINE SA IN THE AFTERNOON IF NEEDED FOR PAIN    naloxone (NARCAN) 4 mg, nasal, As needed, May repeat every 2-3 minutes if needed, alternating nostrils, until medical assistance becomes available.    phytonadione (VITAMIN K) 2.5 mg, oral, Every other day    rivaroxaban (XARELTO) 20 mg, oral, Daily    tiZANidine (ZANAFLEX) 4 mg, oral, Every 6 hours PRN        Drug Allergies/Intolerances:  RX Allergies[5]     Visit Vitals  /77   Pulse 69   Resp 16   Wt 69.4 kg (152 lb 14.4 oz)   SpO2 97%   BMI 20.73 kg/m²   Smoking Status Former   BSA 1.88 m²        Physical exam  Constitutional: Normal appearance.  HEENT: Normocephalic and atraumatic.  Cardiovascular: Normal rate and regular rhythm.  Pulmonary: Normal respiratory effort, bilateral clear breath sounds, no wheezing or rhonchi.  Musculoskeletal: No edema, no cyanosis.  Neurological: Awake, alert and oriented x3.  Psychiatric: Normal behavior, mood and affect.    Pulmonary Function Test Results     Pulmonary Functions Testing Results:    No results found for: \"FEV1\", \"FVC\", \"MNI8TYI\", \"TLC\", \"DLCO\"     Chest Radiograph     XR chest 2 views 05/02/2025    Narrative  Interpreted By:  Jamil Silva,  STUDY:  XR CHEST 2 VIEWS    INDICATION:  Signs/Symptoms:PNEUMONIA UNSPECIFIED.    COMPARISON:  April 23    ACCESSION NUMBER(S):  [id number]    ORDERING CLINICIAN:  TODD CORONADO    FINDINGS:  Patchy airspace opacity in the left mid lung grossly similar to the  previous examination.    No effusion or pneumothorax.    Impression  Unchanged appearance left upper lobe consolidation.    Signed by: Jamil Silva 5/3/2025 5:56 PM  Dictation workstation:   [id number]      Echocardiogram     No results found for this or any " "previous visit from the past 365 days.       Chest CT Scan     No results found for this or any previous visit from the past 365 days.       Laboratory Studies     Lab Results   Component Value Date    WBC 5.9 05/07/2025    HGB 11.6 (L) 05/07/2025    HCT 36.4 (L) 05/07/2025     (H) 05/07/2025     05/07/2025      Lab Results   Component Value Date    GLUCOSE 89 05/07/2025    CALCIUM 8.1 (L) 05/07/2025     05/07/2025    K 4.0 05/07/2025    CO2 21 05/07/2025     05/07/2025    BUN 14 05/07/2025    CREATININE 0.81 05/07/2025      Lab Results   Component Value Date    ALT 7 (L) 05/07/2025    AST 13 05/07/2025    ALKPHOS 89 05/07/2025    BILITOT 0.5 05/07/2025      Protime   Date/Time Value Ref Range Status   05/07/2025 02:56 PM 16.4 (H) 9.8 - 12.4 seconds Final     INR   Date/Time Value Ref Range Status   05/07/2025 02:56 PM 1.5 (H) 0.9 - 1.1 Final     No results found for: \"ICIGE\", \"IGE\", \"ICA04\", \"ASPFU\", \"IGG\", \"IGA\", \"IGM\"    Sputum Culture     No results found for: \"AFBCX\"   No results found for: \"RESPCULTCYFI\"  No results found for the last 90 days.          Assessment and Plan / Recommendations   80-year-old man with history of gastric surgery long time ago for ulcer, known aspiration risk/esophageal strictures post dilation, presenting for lung nodule and dyspnea.    1.  Left upper lobe dense nodule, suspect due to infection /aspiration, patient treated in January and May for pneumonia, both CTs reviewed, suspect still aspirating  - Repeat CT chest in 4 to 6 weeks to assure resolution otherwise will need PET scan and biopsy (unlikely, suspect nodule is due to infection, was not present on CT from January)  - Swallow study.  Has follow-up with GI next week, reported getting  esophageal dilation every year    Dyspnea likely due to deconditioning since her surgery and recurrent pneumonia, if no improvement will obtain PFT to further evaluate.    This dictation was created using voice " recognition software. Phonetic and/or minor grammatical errors may exist.    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)     Coronary artery disease     Gout     Hyperlipidemia     Hypertension     Transient cerebral ischemic attack, unspecified     TIA (transient ischemic attack)   [2]   Past Surgical History:  Procedure Laterality Date    ANTERIOR CERVICAL DISCECTOMY W/ FUSION  2025    C5-6 anterior cervical discectomy fusion, with cervical traction (Neck)    CARDIAC CATHETERIZATION N/A 2023    Procedure: Left Heart Cath;  Surgeon: Manuel Blackwell MD;  Location: [facility];  Service: Cardiovascular;  Laterality: N/A;    MR HEAD ANGIO WO IV CONTRAST  2018    MR HEAD ANGIO WO IV CONTRAST 2018 [facility]    MR NECK ANGIO WO IV CONTRAST  2018    MR NECK ANGIO WO IV CONTRAST 2018 [facility]    OTHER SURGICAL HISTORY      cervical fusion    OTHER SURGICAL HISTORY Bilateral     Knee surgery    OTHER SURGICAL HISTORY      Cardiac catheterization with stent placement    OTHER SURGICAL HISTORY      Stomach surgery    OTHER SURGICAL HISTORY      Back surgery   [3]   Family History  Problem Relation Name Age of Onset    Heart attack Mother      Heart attack Father      Heart attack Other Aunt    [4]   Social History  Tobacco Use    Smoking status: Former     Current packs/day: 0.00     Types: Cigarettes     Quit date:      Years since quittin.4    Smokeless tobacco: Never   Vaping Use    Vaping status: Never Used   Substance Use Topics    Alcohol use: Yes     Comment: Occasional    Drug use: Never   [5]   Allergies  Allergen Reactions    Nsaids (Non-Steroidal Anti-Inflammatory Drug) GI Upset and Nausea/vomiting     No NSAIDs due to stomach surgeries

## 2025-05-28 ENCOUNTER — PATIENT OUTREACH (OUTPATIENT)
Dept: HEMATOLOGY/ONCOLOGY | Facility: CLINIC | Age: 80
End: 2025-05-28
Payer: MEDICARE

## 2025-05-28 SDOH — HEALTH STABILITY: PHYSICAL HEALTH: ON AVERAGE, HOW MANY MINUTES DO YOU ENGAGE IN EXERCISE AT THIS LEVEL?: 20 MIN

## 2025-05-28 SDOH — ECONOMIC STABILITY: INCOME INSECURITY: IN THE LAST 12 MONTHS, WAS THERE A TIME WHEN YOU WERE NOT ABLE TO PAY THE MORTGAGE OR RENT ON TIME?: NO

## 2025-05-28 SDOH — HEALTH STABILITY: PHYSICAL HEALTH: ON AVERAGE, HOW MANY DAYS PER WEEK DO YOU ENGAGE IN MODERATE TO STRENUOUS EXERCISE (LIKE A BRISK WALK)?: 5 DAYS

## 2025-05-28 SDOH — ECONOMIC STABILITY: TRANSPORTATION INSECURITY
IN THE PAST 12 MONTHS, HAS THE LACK OF TRANSPORTATION KEPT YOU FROM MEDICAL APPOINTMENTS OR FROM GETTING MEDICATIONS?: NO

## 2025-05-28 SDOH — HEALTH STABILITY: PHYSICAL HEALTH: ON AVERAGE, HOW MANY MINUTES DO YOU ENGAGE IN EXERCISE AT THIS LEVEL?: 30 MIN

## 2025-05-28 SDOH — ECONOMIC STABILITY: GENERAL
WHICH OF THE FOLLOWING DO YOU KNOW HOW TO USE AND HAVE ACCESS TO EVERY DAY? (CHOOSE ALL THAT APPLY): SMARTPHONE WITH CELLULAR DATA PLAN

## 2025-05-28 SDOH — ECONOMIC STABILITY: TRANSPORTATION INSECURITY
IN THE PAST 12 MONTHS, HAS LACK OF TRANSPORTATION KEPT YOU FROM MEETINGS, WORK, OR FROM GETTING THINGS NEEDED FOR DAILY LIVING?: NO

## 2025-05-28 SDOH — ECONOMIC STABILITY: GENERAL
WHICH OF THE FOLLOWING WOULD YOU LIKE TO GET CONNECTED TO IN ORDER TO RECEIVE A DISCOUNT OR FOR FREE? (CHOOSE ALL THAT APPLY): PATIENT DECLINED

## 2025-05-28 ASSESSMENT — SOCIAL DETERMINANTS OF HEALTH (SDOH)
HOW OFTEN DO YOU GET TOGETHER WITH FRIENDS OR RELATIVES?: ONCE A WEEK
HOW OFTEN DO YOU ATTEND CHURCH OR RELIGIOUS SERVICES?: NEVER
IN THE PAST 12 MONTHS, HAS THE ELECTRIC, GAS, OIL, OR WATER COMPANY THREATENED TO SHUT OFF SERVICE IN YOUR HOME?: NO
WITHIN THE LAST YEAR, HAVE YOU BEEN HUMILIATED OR EMOTIONALLY ABUSED IN OTHER WAYS BY YOUR PARTNER OR EX-PARTNER?: NO
WITHIN THE LAST YEAR, HAVE YOU BEEN AFRAID OF YOUR PARTNER OR EX-PARTNER?: NO
DO YOU BELONG TO ANY CLUBS OR ORGANIZATIONS SUCH AS CHURCH GROUPS UNIONS, FRATERNAL OR ATHLETIC GROUPS, OR SCHOOL GROUPS?: NO
HOW OFTEN DO YOU ATTENT MEETINGS OF THE CLUB OR ORGANIZATION YOU BELONG TO?: NEVER
WITHIN THE LAST YEAR, HAVE TO BEEN RAPED OR FORCED TO HAVE ANY KIND OF SEXUAL ACTIVITY BY YOUR PARTNER OR EX-PARTNER?: NO
WITHIN THE LAST YEAR, HAVE YOU BEEN KICKED, HIT, SLAPPED, OR OTHERWISE PHYSICALLY HURT BY YOUR PARTNER OR EX-PARTNER?: NO
IN A TYPICAL WEEK, HOW MANY TIMES DO YOU TALK ON THE PHONE WITH FAMILY, FRIENDS, OR NEIGHBORS?: MORE THAN THREE TIMES A WEEK
HOW HARD IS IT FOR YOU TO PAY FOR THE VERY BASICS LIKE FOOD, HOUSING, MEDICAL CARE, AND HEATING?: NOT HARD AT ALL

## 2025-05-28 ASSESSMENT — LIFESTYLE VARIABLES
HOW MANY STANDARD DRINKS CONTAINING ALCOHOL DO YOU HAVE ON A TYPICAL DAY: PATIENT DOES NOT DRINK
HOW OFTEN DO YOU HAVE SIX OR MORE DRINKS ON ONE OCCASION: NEVER
SKIP TO QUESTIONS 9-10: 1
AUDIT-C TOTAL SCORE: 0
HOW OFTEN DO YOU HAVE A DRINK CONTAINING ALCOHOL: NEVER

## 2025-05-28 ASSESSMENT — PATIENT HEALTH QUESTIONNAIRE - PHQ9
SUM OF ALL RESPONSES TO PHQ9 QUESTIONS 1 & 2: 0
2. FEELING DOWN, DEPRESSED OR HOPELESS: NOT AT ALL
1. LITTLE INTEREST OR PLEASURE IN DOING THINGS: NOT AT ALL

## 2025-05-28 NOTE — PROGRESS NOTES
Spoke with pts wife. NO SDOH concerns att. Per Dr Strauss and pts wife, he feels this abnormality found on the CT scan could be an infections process and recommends a repeat Ct in 3 months. Pt and wife aware Dr Rivera will address and make his recommendations. No questions noted att. Wife will be at the appt with pt.

## 2025-05-30 ENCOUNTER — OFFICE VISIT (OUTPATIENT)
Dept: HEMATOLOGY/ONCOLOGY | Facility: CLINIC | Age: 80
End: 2025-05-30
Payer: MEDICARE

## 2025-05-30 VITALS
RESPIRATION RATE: 17 BRPM | SYSTOLIC BLOOD PRESSURE: 124 MMHG | DIASTOLIC BLOOD PRESSURE: 75 MMHG | TEMPERATURE: 97.7 F | WEIGHT: 147.16 LBS | BODY MASS INDEX: 19.93 KG/M2 | HEIGHT: 72 IN | OXYGEN SATURATION: 95 % | HEART RATE: 82 BPM

## 2025-05-30 DIAGNOSIS — R91.1 LUNG NODULE: Primary | ICD-10-CM

## 2025-05-30 DIAGNOSIS — C39.0: ICD-10-CM

## 2025-05-30 PROCEDURE — 3074F SYST BP LT 130 MM HG: CPT | Performed by: INTERNAL MEDICINE

## 2025-05-30 PROCEDURE — 1111F DSCHRG MED/CURRENT MED MERGE: CPT | Performed by: INTERNAL MEDICINE

## 2025-05-30 PROCEDURE — 1125F AMNT PAIN NOTED PAIN PRSNT: CPT | Performed by: INTERNAL MEDICINE

## 2025-05-30 PROCEDURE — 99205 OFFICE O/P NEW HI 60 MIN: CPT | Performed by: INTERNAL MEDICINE

## 2025-05-30 PROCEDURE — 3078F DIAST BP <80 MM HG: CPT | Performed by: INTERNAL MEDICINE

## 2025-05-30 PROCEDURE — 99215 OFFICE O/P EST HI 40 MIN: CPT | Performed by: INTERNAL MEDICINE

## 2025-05-30 PROCEDURE — 1159F MED LIST DOCD IN RCRD: CPT | Performed by: INTERNAL MEDICINE

## 2025-05-30 RX ORDER — FUROSEMIDE 20 MG/1
20 TABLET ORAL DAILY PRN
COMMUNITY
Start: 2025-05-27

## 2025-05-30 ASSESSMENT — ENCOUNTER SYMPTOMS
CARDIOVASCULAR NEGATIVE: 1
CONSTITUTIONAL NEGATIVE: 1
LOSS OF SENSATION IN FEET: 1
OCCASIONAL FEELINGS OF UNSTEADINESS: 0
DEPRESSION: 0
RESPIRATORY NEGATIVE: 1
GASTROINTESTINAL NEGATIVE: 1

## 2025-05-30 ASSESSMENT — COLUMBIA-SUICIDE SEVERITY RATING SCALE - C-SSRS
2. HAVE YOU ACTUALLY HAD ANY THOUGHTS OF KILLING YOURSELF?: NO
1. IN THE PAST MONTH, HAVE YOU WISHED YOU WERE DEAD OR WISHED YOU COULD GO TO SLEEP AND NOT WAKE UP?: NO
6. HAVE YOU EVER DONE ANYTHING, STARTED TO DO ANYTHING, OR PREPARED TO DO ANYTHING TO END YOUR LIFE?: NO

## 2025-05-30 ASSESSMENT — PAIN SCALES - GENERAL: PAINLEVEL_OUTOF10: 4

## 2025-05-30 NOTE — PATIENT INSTRUCTIONS
Today you met with your hematologist/oncologist.  Recent findings were discussed and questions answered.  Scheduling orders were placed.  While we appreciate that you verbalized understanding, if any questions arise after leaving, please do not hesitate to call the office to discuss.  [phone] Siomara Rivera will see you after the PET scan to review your results. This scan will help determine the significance of the nodule in your lung.

## 2025-05-30 NOTE — PROGRESS NOTES
"Patient ID: Rick Arauz is a 80 y.o. male.  Referring Physician: Armen Spangler DO  [address]  [place] [id number]  Primary Care Provider: Armen Spangler DO  Visit Type:  Initial Visit     Subjective    HPI  TIA in 2018:  Stent Cardiac 2015: RX Brilinta: for 12 months discontinued placed on Xarelto:   Growing lung nodule: PET SCAN:  History of smoking : Quit in the 1960's: For 10 years:   Review of Systems   Constitutional: Negative.    HENT:  Negative.     Respiratory: Negative.     Cardiovascular: Negative.    Gastrointestinal: Negative.         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   has a past surgical history that includes MR angio head wo IV contrast (11/04/2018); MR angio neck wo IV contrast (11/04/2018); Cardiac catheterization (N/A, 11/17/2023); Other surgical history; Other surgical history (Bilateral); Other surgical history; Other surgical history; Other surgical history; and Anterior cervical discectomy w/ fusion (03/21/2025).  Family History[1]  Oncology History    No history exists.       Naveen Arauz \"Rick\"  reports that he quit smoking about 52 years ago. His smoking use included cigarettes. He has never used smokeless tobacco.  He  reports current alcohol use.  He  reports no history of drug use.    Physical Exam  Constitutional:       Appearance: Normal appearance.   HENT:      Head: Normocephalic and atraumatic.   Eyes:      Extraocular Movements: Extraocular movements intact.      Pupils: Pupils are equal, round, and reactive to light. "   Neurological:      Mental Status: He is alert.         WBC   Date/Time Value Ref Range Status   05/07/2025 03:36 AM 5.9 4.4 - 11.3 x10*3/uL Final   05/06/2025 11:55 AM 8.5 4.4 - 11.3 x10*3/uL Final   03/22/2025 05:01 AM 9.2 4.4 - 11.3 x10*3/uL Final     nRBC   Date Value Ref Range Status   05/07/2025 0.0 0.0 - 0.0 /100 WBCs Final   05/06/2025 0.0 0.0 - 0.0 /100 WBCs Final   03/22/2025 0.0 0.0 - 0.0 /100 WBCs Final     RBC   Date Value Ref Range Status   05/07/2025 3.60 (L) 4.50 - 5.90 x10*6/uL Final   05/06/2025 4.18 (L) 4.50 - 5.90 x10*6/uL Final   03/22/2025 3.46 (L) 4.50 - 5.90 x10*6/uL Final     Hemoglobin   Date Value Ref Range Status   05/07/2025 11.6 (L) 13.5 - 17.5 g/dL Final   05/06/2025 13.3 (L) 13.5 - 17.5 g/dL Final   03/22/2025 11.4 (L) 13.5 - 17.5 g/dL Final     Hematocrit   Date Value Ref Range Status   05/07/2025 36.4 (L) 41.0 - 52.0 % Final   05/06/2025 41.9 41.0 - 52.0 % Final   03/22/2025 36.6 (L) 41.0 - 52.0 % Final     MCV   Date/Time Value Ref Range Status   05/07/2025 03:36  (H) 80 - 100 fL Final   05/06/2025 11:55  80 - 100 fL Final   03/22/2025 05:01  (H) 80 - 100 fL Final     MCH   Date/Time Value Ref Range Status   05/07/2025 03:36 AM 32.2 26.0 - 34.0 pg Final   05/06/2025 11:55 AM 31.8 26.0 - 34.0 pg Final   03/22/2025 05:01 AM 32.9 26.0 - 34.0 pg Final     MCHC   Date/Time Value Ref Range Status   05/07/2025 03:36 AM 31.9 (L) 32.0 - 36.0 g/dL Final   05/06/2025 11:55 AM 31.7 (L) 32.0 - 36.0 g/dL Final   03/22/2025 05:01 AM 31.1 (L) 32.0 - 36.0 g/dL Final     RDW   Date/Time Value Ref Range Status   05/07/2025 03:36 AM 13.7 11.5 - 14.5 % Final   05/06/2025 11:55 AM 13.8 11.5 - 14.5 % Final   03/22/2025 05:01 AM 12.9 11.5 - 14.5 % Final     Platelets   Date/Time Value Ref Range Status   05/07/2025 03:36  150 - 450 x10*3/uL Final   05/06/2025 11:55  150 - 450 x10*3/uL Final   03/22/2025 05:01  (L) 150 - 450 x10*3/uL Final     MPV   Date/Time Value Ref  Range Status   02/12/2019 11:56 AM 12.8 (H) 7.0 - 12.6 CU Final     Neutrophils %   Date/Time Value Ref Range Status   05/06/2025 11:55 AM 71.6 40.0 - 80.0 % Final   03/10/2025 12:30 PM 66.8 40.0 - 80.0 % Final   01/05/2025 01:57 AM 67.2 40.0 - 80.0 % Final     Immature Granulocytes %, Automated   Date/Time Value Ref Range Status   05/07/2025 03:36 AM 0.3 0.0 - 0.9 % Final     Comment:     Immature Granulocyte Count (IG) includes promyelocytes, myelocytes and metamyelocytes but does not include bands. Percent differential counts (%) should be interpreted in the context of the absolute cell counts (cells/UL).   05/06/2025 11:55 AM 0.2 0.0 - 0.9 % Final     Comment:     Immature Granulocyte Count (IG) includes promyelocytes, myelocytes and metamyelocytes but does not include bands. Percent differential counts (%) should be interpreted in the context of the absolute cell counts (cells/UL).   03/10/2025 12:30 PM 0.3 0.0 - 0.9 % Final     Comment:     Immature Granulocyte Count (IG) includes promyelocytes, myelocytes and metamyelocytes but does not include bands. Percent differential counts (%) should be interpreted in the context of the absolute cell counts (cells/UL).     Lymphocytes %, Manual   Date/Time Value Ref Range Status   05/07/2025 03:36 AM 19.3 13.0 - 44.0 % Final     Lymphocytes %   Date/Time Value Ref Range Status   05/06/2025 11:55 AM 18.6 13.0 - 44.0 % Final   03/10/2025 12:30 PM 20.3 13.0 - 44.0 % Final   01/05/2025 01:57 AM 19.7 13.0 - 44.0 % Final     Monocytes %, Manual   Date/Time Value Ref Range Status   05/07/2025 03:36 AM 5.9 2.0 - 10.0 % Final     Monocytes %   Date/Time Value Ref Range Status   05/06/2025 11:55 AM 8.0 2.0 - 10.0 % Final   03/10/2025 12:30 PM 8.2 2.0 - 10.0 % Final   01/05/2025 01:57 AM 9.2 2.0 - 10.0 % Final     Eosinophils %, Manual   Date/Time Value Ref Range Status   05/07/2025 03:36 AM 0.9 0.0 - 6.0 % Final     Eosinophils %   Date/Time Value Ref Range Status   05/06/2025  11:55 AM 1.1 0.0 - 6.0 % Final   03/10/2025 12:30 PM 3.5 0.0 - 6.0 % Final   01/05/2025 01:57 AM 1.6 0.0 - 6.0 % Final     Basophils %, Manual   Date/Time Value Ref Range Status   05/07/2025 03:36 AM 0.8 0.0 - 2.0 % Final     Basophils %   Date/Time Value Ref Range Status   05/06/2025 11:55 AM 0.5 0.0 - 2.0 % Final   03/10/2025 12:30 PM 0.9 0.0 - 2.0 % Final   01/05/2025 01:57 AM 0.6 0.0 - 2.0 % Final     Neutrophils Absolute   Date/Time Value Ref Range Status   05/06/2025 11:55 AM 6.09 (H) 1.60 - 5.50 x10*3/uL Final     Comment:     Percent differential counts (%) should be interpreted in the context of the absolute cell counts (cells/uL).   03/10/2025 12:30 PM 5.11 1.60 - 5.50 x10*3/uL Final     Comment:     Percent differential counts (%) should be interpreted in the context of the absolute cell counts (cells/uL).   01/05/2025 01:57 AM 8.51 (H) 1.60 - 5.50 x10*3/uL Final     Comment:     Percent differential counts (%) should be interpreted in the context of the absolute cell counts (cells/uL).     Immature Granulocytes Absolute, Automated   Date/Time Value Ref Range Status   05/07/2025 03:36 AM 0.02 0.00 - 0.50 x10*3/uL Final   05/06/2025 11:55 AM 0.02 0.00 - 0.50 x10*3/uL Final   03/10/2025 12:30 PM 0.02 0.00 - 0.50 x10*3/uL Final     Lymphocytes Absolute   Date/Time Value Ref Range Status   05/06/2025 11:55 AM 1.58 0.80 - 3.00 x10*3/uL Final   03/10/2025 12:30 PM 1.55 0.80 - 3.00 x10*3/uL Final   01/05/2025 01:57 AM 2.49 0.80 - 3.00 x10*3/uL Final     Monocytes Absolute   Date/Time Value Ref Range Status   05/06/2025 11:55 AM 0.68 0.05 - 0.80 x10*3/uL Final   03/10/2025 12:30 PM 0.63 0.05 - 0.80 x10*3/uL Final   01/05/2025 01:57 AM 1.17 (H) 0.05 - 0.80 x10*3/uL Final     Eosinophils Absolute   Date/Time Value Ref Range Status   05/06/2025 11:55 AM 0.09 0.00 - 0.40 x10*3/uL Final   03/10/2025 12:30 PM 0.27 0.00 - 0.40 x10*3/uL Final   01/05/2025 01:57 AM 0.20 0.00 - 0.40 x10*3/uL Final     Eosinophils Absolute,  "Manual   Date/Time Value Ref Range Status   05/07/2025 03:36 AM 0.05 0.00 - 0.40 x10*3/uL Final     Basophils Absolute   Date/Time Value Ref Range Status   05/06/2025 11:55 AM 0.04 0.00 - 0.10 x10*3/uL Final   03/10/2025 12:30 PM 0.07 0.00 - 0.10 x10*3/uL Final   01/05/2025 01:57 AM 0.07 0.00 - 0.10 x10*3/uL Final     Basophils Absolute, Manual   Date/Time Value Ref Range Status   05/07/2025 03:36 AM 0.05 0.00 - 0.10 x10*3/uL Final       No components found for: \"PT\"  aPTT   Date/Time Value Ref Range Status   05/07/2025 02:56 PM 35 26 - 36 seconds Final   05/07/2025 03:36 AM 39 (H) 26 - 36 seconds Final   05/06/2025 11:55 AM 56 (H) 26 - 36 seconds Final     IMPRESSION:5/6/2025  1. 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  2. Scattered groundglass opacities noted within the anterior left  upper lobe and left lower lobe, anterior right upper lobe with  ill-defined nodular and groundglass opacities within the posterior  left lower lung compatible with multifocal pneumonia.  3. There is an irregular nodular density of 1.3 cm within the left  midlung field concerning for a primary lung malignancy.  PET/CT  imaging suggested for further evaluation.  4. Cholelithiasis without evidence of acute cholecystitis.  5. Nonobstructing left midpole stone measuring 0.26 cm.  6. Compression fractures involving the upper L1 and L2 vertebral  bodies which a 20-25% loss of the vertebral body height. Findings are  indeterminate but new, when compared with the prior study dated  01/05/2025.  Signed by Andrea Javier MD    Assessment/Plan      Lung nodule : PET SCAN ordered: RTC 4 weeks.  Hemostatic management as per Cardiology..     Diagnoses and all orders for this visit:  Lung nodule  -     NM PET CT bone skull base to mid thigh; Future  Malignant neoplasm of upper respiratory tract, part unspecified  -     NM PET CT bone skull base to mid thigh; Future  -     Clinic Appointment Request " Follow Up (review pet); Future           Taco Rivera MD                              [1]   Family History  Problem Relation Name Age of Onset    Heart attack Mother      Heart attack Father      Heart attack Other Aunt

## 2025-06-02 ENCOUNTER — HOSPITAL ENCOUNTER (OUTPATIENT)
Dept: RADIOLOGY | Facility: HOSPITAL | Age: 80
Discharge: HOME | End: 2025-06-02
Payer: MEDICARE

## 2025-06-02 DIAGNOSIS — R05.1 ACUTE COUGH: Primary | ICD-10-CM

## 2025-06-02 DIAGNOSIS — R05.9 COUGH, UNSPECIFIED: ICD-10-CM

## 2025-06-02 PROCEDURE — 92611 MOTION FLUOROSCOPY/SWALLOW: CPT | Mod: GN

## 2025-06-02 PROCEDURE — 2500000005 HC RX 250 GENERAL PHARMACY W/O HCPCS: Performed by: INTERNAL MEDICINE

## 2025-06-02 PROCEDURE — 74230 X-RAY XM SWLNG FUNCJ C+: CPT

## 2025-06-02 PROCEDURE — 74230 X-RAY XM SWLNG FUNCJ C+: CPT | Performed by: RADIOLOGY

## 2025-06-02 RX ADMIN — BARIUM SULFATE 70 ML: 400 SUSPENSION ORAL at 14:01

## 2025-06-02 RX ADMIN — BARIUM SULFATE 90 ML: 0.81 POWDER, FOR SUSPENSION ORAL at 14:01

## 2025-06-02 RX ADMIN — BARIUM SULFATE 700 MG: 700 TABLET ORAL at 14:01

## 2025-06-02 RX ADMIN — BARIUM SULFATE 5 ML: 400 SUSPENSION ORAL at 14:01

## 2025-06-02 RX ADMIN — BARIUM SULFATE 15 ML: 400 PASTE ORAL at 14:01

## 2025-06-02 NOTE — PROGRESS NOTES
"Speech-Language Pathology    Outpatient Modified Barium Swallow Study    Patient Name: Naveen Arauz \"Rick\"  MRN: [id number]  : 1945  Today's Date: 25  Time Calculation  Start Time: 1330  Stop Time: 1400  Time Calculation (min): 30 min      Modified Barium Swallow Study completed. Informed verbal consent obtained prior to completion of exam. The study was completed per protocol with various liquid barium consistencies, pudding, solids and a 13mm barium tablet. Clinician neglected to attach a marker to his neck.  The anatomic structures and function of the oropharynx, larynx, hypopharynx and cervical esophagus were evaluated.    SLP: Estrella Nesbitt SLP   Contact info: [employer] secure chat      Reason for Referral: dysphagia with significant weight loss  Patient Hx: In 2025 the patient fractured his neck and underwent surgery with plate apparent on images.       History of esophageal dilations, gastric surgery for ulcer, lung nodule and dyspnea.   Respiratory Status: Room air  Current diet: Mostly liquids and some soft foods.     Pain:  Pain Scale: 0-10  Ratin    DIET RECOMMENDATIONS:     Per the results of today's MBSS, patient to maintain a diet of pureed/ very soft consistencies and thin liquids following swallow strategies listed below:    STRATEGIES:  Upright posture for oral intake  Small sips  Multiple swallows per bite/ sip  Alternate liquids and solids  Chin tuck with liquids/ solids  Frequent small snacks throughout the day instead of large meals  Reflux precautions  Reflux precautions: HOB >30 degrees at all times, fully upright to 90 degrees for PO intake and remain upright for 60 minutes afterward, slow rate, smaller/more frequent meals/snacks, stop eating at first sign of satiety.        SLP PLAN:  Skilled SLP Services: yes  SLP Frequency: one session today.   Treatment/Interventions:   - Compensatory strategy training  - Patient/caregiver education    Discussed POC: " Patient  Discussed Risks/Benefits: Yes  Patient/Caregiver Agreeable: Yes    Short term goals established 06/02/25:   Pt will demonstrate comprehension of results of Modified barium swallow and rationale for diet consistency modifications and appropriate safe swallowing techniques. Goal achieved.  Pt will demonstrate implementation of safest swallowing recommendations (including small sips, chin tuck, alternating liquids and solids, and multiple swallows to clear) on 5/5 swallows in therapy.  Goal achieved.       Long term goals 06/02/25:   Patient will tolerate the least restrictive diet without overt difficulty or further pulmonary compromise by time of discharge.      Education Provided: Results and recommendations per MBSS, with video review; recommendations and POC at this time. Verbal understanding and agreement given on all accounts.     Treatment Provided Today: ST provided extensive education and training to pt/pt family regarding anatomy/physiology of swallow function, risk factors of aspiration/aspiration pna & how to mitigate factors, diet modifications, and the use of compensatory swallow strategies to promote pt safety upon PO intake including small sips, alternating liquids and swallows, chin tuck, extra swallows.     Additional Medical Consults Suggested:   - GI  -ENT - evaluation of laryngeal dysfunction which contributes to aspiration/ dysphagia    Repeat Study: Per MD or treating SLP       Mechanics of the Swallow Summary:  ORAL PHASE:  Lip Closure - Interlabial escape/no progression to anterior lip   Tongue Control During Bolus Hold - Cohesive bolus between tongue to palatal seal   Bolus prep/mastication - Timely and efficient mastication skills   Bolus transport/lingual motion - Brisk tongue motion for A-P movement of the bolus   Oral residue - Trace residue lining oral structures     PHARYNGEAL PHASE:  Initiation of pharyngeal swallow - Bolus head at pit of pyriforms   Soft palate elevation - No  bolus between soft palate/pharyngeal wall   Laryngeal elevation - Minimal superior movement of thyroid cartilage with minimal approximation of arytenoids to epiglottic petiole   Anterior hyoid excursion - Partial anterior movement   Epiglottic movement - No inversion   Laryngeal vestibule closure - None - wide column of air / contrast in laryngeal vestibule   Pharyngeal stripping wave - Present, however, significantly diminished   Pharyngeal contraction (A/P view) - Complete  Pharyngoesophageal segment opening - Partial distension/partial duration with partial obstruction of flow of bolus   Tongue base retraction - Narrow column of contrast or air between tongue base and pharyngeal wall   Pharyngeal residue - Majority of contrast within or on the pharyngeal structures  with puree, improved with liquids, liquids rinse, double swallow    ESOPHAGEAL PHASE:  Esophageal clearance - Esophageal retention with retrograde flow below the pharyngoesophageal segment . Significant liquid residue in distal esophagus.  Pill remained in the proximal esophagus after 5 minutes of sipping liquids.  Pt was in no distress with the pill in his esophagus. He reported sensation for same.       SLP Impressions with Severity Rating:   Pt presents with marked oropharyngeal dysphagia upon completion of modified barium swallow study this date.   Swallowing physiology is detailed above.   Aspiration risk is elevated due to pharyngeal phase dysphagia (before, during and after the swallow) and significant esophageal dysphagia (after meals/ snacks).  Impairments most impacting swallowing safety and efficiency include limited pharyngeal propulsion, poor laryngeal closure with risk for aspiration of any laryngeal / pharyngeal residue.   Patient demonstrated silent aspiration of thin liquids and deep laryngeal penetration with mildly/  nectar thick liquids.  Significant esophageal dysphagia with retention of pill in proximal esophagus.     *Of note:  The A-P bolus follow-through is not intended to be utilized as a diagnostic assessment of the esophagus, rather a tool to observe the biomechanical aspects of the swallow continuum and to inform the need for further evaluation by medical specialists, as applicable.     Strategies attempted- Small sips, chin tuck, effortful swallow, alternating liquids and solids improved oral and pharyngeal clearance and improved airway protection.       OUTCOME MEASURES:  Functional Oral Intake Scale  Functional Oral Intake Scale: Level 5        total oral diet with multiple consistencies, but requires special preparations and compensations       Eating Assessment Tool (EAT-10)   0=No problem, 1=Mild problem, 2=Mild to moderate problem, 3=Moderate problem, 4=Severe problem    EAT 10  My swallowing problem has caused me to lose weight.: 4  My swallowing problem interferes with my ability to go out for meals.: 4  Swallowing liquids takes extra effort.: 4  Swallowing solids takes extra effort.: 4  Swallowing pills takes extra effort.: 4  Swallowing is painful: 4  The pleasure of eating is affected by my swallowing.: 4  When I swallow food sticks in my throat.: 4  I cough when I eat.: 4  Swallowing is stressful: 4  EAT-10 TOTAL SCORE:: 40    A total score of 3 or above may indicate difficulty with swallowing safely and/or efficiently      Rosenbek's Penetration Aspiration Scale  Thin Liquids: 8. SILENT ASPIRATION - contrast passes glottis, visible residue, NO pt response]   During the Swallow  After the Swallow  Nectar Thick Liquids: 5. DEEP PENETRATION with HIGH ASPIRATION risk - contrast contacts vocal cords, visible residue  Honey Thick Liquids: 3. PENETRATION with LOW ASPIRATION risk - contrast remains above vocal cords, visible residue]   Puree: 1. NO ASPIRATION & NO PENETRATION - no aspiration, contrast does not enter airway  Solids: 1. NO ASPIRATION & NO PENETRATION - no aspiration, contrast does not enter airway

## 2025-06-03 ENCOUNTER — HOSPITAL ENCOUNTER (OUTPATIENT)
Dept: RADIOLOGY | Facility: HOSPITAL | Age: 80
Discharge: HOME | End: 2025-06-03
Payer: MEDICARE

## 2025-06-03 DIAGNOSIS — C39.0: ICD-10-CM

## 2025-06-03 DIAGNOSIS — R91.1 LUNG NODULE: ICD-10-CM

## 2025-06-03 PROCEDURE — A9552 F18 FDG: HCPCS | Performed by: INTERNAL MEDICINE

## 2025-06-03 PROCEDURE — 3430000001 HC RX 343 DIAGNOSTIC RADIOPHARMACEUTICALS: Performed by: INTERNAL MEDICINE

## 2025-06-03 PROCEDURE — 78815 PET IMAGE W/CT SKULL-THIGH: CPT | Mod: PI

## 2025-06-03 PROCEDURE — 78815 PET IMAGE W/CT SKULL-THIGH: CPT | Mod: PET TUMOR INIT TX STRAT | Performed by: STUDENT IN AN ORGANIZED HEALTH CARE EDUCATION/TRAINING PROGRAM

## 2025-06-03 RX ORDER — FLUDEOXYGLUCOSE F 18 200 MCI/ML
12.8 INJECTION, SOLUTION INTRAVENOUS
Status: COMPLETED | OUTPATIENT
Start: 2025-06-03 | End: 2025-06-03

## 2025-06-03 RX ADMIN — FLUDEOXYGLUCOSE F 18 12.8 MILLICURIE: 200 INJECTION, SOLUTION INTRAVENOUS at 09:47

## 2025-06-05 ENCOUNTER — TELEPHONE (OUTPATIENT)
Dept: HEMATOLOGY/ONCOLOGY | Facility: CLINIC | Age: 80
End: 2025-06-05
Payer: MEDICARE

## 2025-06-05 NOTE — TELEPHONE ENCOUNTER
Reason for Conversation  Medication Question    Background   Karla would like to know if Rick should continue taking the Vitamin K1.   She would also like to discuss the PET scan results (Karla is aware of the follow up scheduled on 6/17).    Disposition   No disposition on file.

## 2025-06-12 ENCOUNTER — APPOINTMENT (OUTPATIENT)
Dept: VASCULAR SURGERY | Facility: CLINIC | Age: 80
End: 2025-06-12
Payer: MEDICARE

## 2025-06-17 ENCOUNTER — OFFICE VISIT (OUTPATIENT)
Dept: HEMATOLOGY/ONCOLOGY | Facility: CLINIC | Age: 80
End: 2025-06-17
Payer: MEDICARE

## 2025-06-17 ENCOUNTER — HOSPITAL ENCOUNTER (OUTPATIENT)
Dept: RADIOLOGY | Facility: HOSPITAL | Age: 80
Discharge: HOME | End: 2025-06-17
Payer: MEDICARE

## 2025-06-17 VITALS
WEIGHT: 148.81 LBS | TEMPERATURE: 97.3 F | RESPIRATION RATE: 16 BRPM | HEART RATE: 71 BPM | BODY MASS INDEX: 20.42 KG/M2 | SYSTOLIC BLOOD PRESSURE: 152 MMHG | DIASTOLIC BLOOD PRESSURE: 72 MMHG | OXYGEN SATURATION: 97 %

## 2025-06-17 DIAGNOSIS — R91.1 LUNG NODULE: ICD-10-CM

## 2025-06-17 DIAGNOSIS — C39.0: ICD-10-CM

## 2025-06-17 PROCEDURE — 3078F DIAST BP <80 MM HG: CPT | Performed by: INTERNAL MEDICINE

## 2025-06-17 PROCEDURE — 3077F SYST BP >= 140 MM HG: CPT | Performed by: INTERNAL MEDICINE

## 2025-06-17 PROCEDURE — 1159F MED LIST DOCD IN RCRD: CPT | Performed by: INTERNAL MEDICINE

## 2025-06-17 PROCEDURE — 71250 CT THORAX DX C-: CPT

## 2025-06-17 PROCEDURE — 99214 OFFICE O/P EST MOD 30 MIN: CPT | Performed by: INTERNAL MEDICINE

## 2025-06-17 PROCEDURE — 1126F AMNT PAIN NOTED NONE PRSNT: CPT | Performed by: INTERNAL MEDICINE

## 2025-06-17 PROCEDURE — 71250 CT THORAX DX C-: CPT | Performed by: SURGERY

## 2025-06-17 RX ORDER — AZITHROMYCIN 250 MG/1
250 TABLET, FILM COATED ORAL DAILY
Qty: 6 TABLET | Refills: 1 | Status: SHIPPED | OUTPATIENT
Start: 2025-06-17 | End: 2025-06-22

## 2025-06-17 RX ORDER — AZITHROMYCIN 250 MG/1
250 TABLET, FILM COATED ORAL DAILY
Qty: 6 TABLET | Refills: 0 | Status: SHIPPED | OUTPATIENT
Start: 2025-06-17 | End: 2025-06-17

## 2025-06-17 ASSESSMENT — ENCOUNTER SYMPTOMS
RESPIRATORY NEGATIVE: 1
GASTROINTESTINAL NEGATIVE: 1
CONSTITUTIONAL NEGATIVE: 1
CARDIOVASCULAR NEGATIVE: 1

## 2025-06-17 ASSESSMENT — PAIN SCALES - GENERAL: PAINLEVEL_OUTOF10: 0-NO PAIN

## 2025-06-17 NOTE — PROGRESS NOTES
"0...................  Patient ID: Rick Arauz is a 80 y.o. male.  Referring Physician: Taco Rivera MD  [id number] [place] [address]  Primary Care Provider: Armen Spangler DO  Visit Type:  Follow Up    Subjective    HPI  TIA in 2018:  Stent Cardiac 2015: RX Brilinta: for 12 months discontinued placed on Xarelto:   Growing lung nodule: PET SCAN:  History of smoking : Quit in the 1960's: For 10 years:   Review of Systems   Constitutional: Negative.    HENT:  Negative.     Respiratory: Negative.     Cardiovascular: Negative.    Gastrointestinal: Negative.         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   has a past surgical history that includes MR angio head wo IV contrast (11/04/2018); MR angio neck wo IV contrast (11/04/2018); Cardiac catheterization (N/A, 11/17/2023); Other surgical history; Other surgical history (Bilateral); Other surgical history; Other surgical history; Other surgical history; and Anterior cervical discectomy w/ fusion (03/21/2025).  Family History[1]  Oncology History    No history exists.       Naveen Arauz \"Rick\"  reports that he quit smoking about 52 years ago. His smoking use included cigarettes. He has never used smokeless tobacco.  He  reports current alcohol use.  He  reports no history of drug use.    Physical Exam  Constitutional:       Appearance: Normal appearance.   HENT:      Head: Normocephalic and atraumatic.   Eyes:      Extraocular Movements: Extraocular movements intact.      Pupils: Pupils are equal, round, and reactive to light.   Neurological:      Mental Status: He is alert. "         WBC   Date/Time Value Ref Range Status   05/07/2025 03:36 AM 5.9 4.4 - 11.3 x10*3/uL Final   05/06/2025 11:55 AM 8.5 4.4 - 11.3 x10*3/uL Final   03/22/2025 05:01 AM 9.2 4.4 - 11.3 x10*3/uL Final     nRBC   Date Value Ref Range Status   05/07/2025 0.0 0.0 - 0.0 /100 WBCs Final   05/06/2025 0.0 0.0 - 0.0 /100 WBCs Final   03/22/2025 0.0 0.0 - 0.0 /100 WBCs Final     RBC   Date Value Ref Range Status   05/07/2025 3.60 (L) 4.50 - 5.90 x10*6/uL Final   05/06/2025 4.18 (L) 4.50 - 5.90 x10*6/uL Final   03/22/2025 3.46 (L) 4.50 - 5.90 x10*6/uL Final     Hemoglobin   Date Value Ref Range Status   05/07/2025 11.6 (L) 13.5 - 17.5 g/dL Final   05/06/2025 13.3 (L) 13.5 - 17.5 g/dL Final   03/22/2025 11.4 (L) 13.5 - 17.5 g/dL Final     Hematocrit   Date Value Ref Range Status   05/07/2025 36.4 (L) 41.0 - 52.0 % Final   05/06/2025 41.9 41.0 - 52.0 % Final   03/22/2025 36.6 (L) 41.0 - 52.0 % Final     MCV   Date/Time Value Ref Range Status   05/07/2025 03:36  (H) 80 - 100 fL Final   05/06/2025 11:55  80 - 100 fL Final   03/22/2025 05:01  (H) 80 - 100 fL Final     MCH   Date/Time Value Ref Range Status   05/07/2025 03:36 AM 32.2 26.0 - 34.0 pg Final   05/06/2025 11:55 AM 31.8 26.0 - 34.0 pg Final   03/22/2025 05:01 AM 32.9 26.0 - 34.0 pg Final     MCHC   Date/Time Value Ref Range Status   05/07/2025 03:36 AM 31.9 (L) 32.0 - 36.0 g/dL Final   05/06/2025 11:55 AM 31.7 (L) 32.0 - 36.0 g/dL Final   03/22/2025 05:01 AM 31.1 (L) 32.0 - 36.0 g/dL Final     RDW   Date/Time Value Ref Range Status   05/07/2025 03:36 AM 13.7 11.5 - 14.5 % Final   05/06/2025 11:55 AM 13.8 11.5 - 14.5 % Final   03/22/2025 05:01 AM 12.9 11.5 - 14.5 % Final     Platelets   Date/Time Value Ref Range Status   05/07/2025 03:36  150 - 450 x10*3/uL Final   05/06/2025 11:55  150 - 450 x10*3/uL Final   03/22/2025 05:01  (L) 150 - 450 x10*3/uL Final     MPV   Date/Time Value Ref Range Status   02/12/2019 11:56 AM 12.8 (H) 7.0 -  12.6 CU Final     Neutrophils %   Date/Time Value Ref Range Status   05/06/2025 11:55 AM 71.6 40.0 - 80.0 % Final   03/10/2025 12:30 PM 66.8 40.0 - 80.0 % Final   01/05/2025 01:57 AM 67.2 40.0 - 80.0 % Final     Immature Granulocytes %, Automated   Date/Time Value Ref Range Status   05/07/2025 03:36 AM 0.3 0.0 - 0.9 % Final     Comment:     Immature Granulocyte Count (IG) includes promyelocytes, myelocytes and metamyelocytes but does not include bands. Percent differential counts (%) should be interpreted in the context of the absolute cell counts (cells/UL).   05/06/2025 11:55 AM 0.2 0.0 - 0.9 % Final     Comment:     Immature Granulocyte Count (IG) includes promyelocytes, myelocytes and metamyelocytes but does not include bands. Percent differential counts (%) should be interpreted in the context of the absolute cell counts (cells/UL).   03/10/2025 12:30 PM 0.3 0.0 - 0.9 % Final     Comment:     Immature Granulocyte Count (IG) includes promyelocytes, myelocytes and metamyelocytes but does not include bands. Percent differential counts (%) should be interpreted in the context of the absolute cell counts (cells/UL).     Lymphocytes %, Manual   Date/Time Value Ref Range Status   05/07/2025 03:36 AM 19.3 13.0 - 44.0 % Final     Lymphocytes %   Date/Time Value Ref Range Status   05/06/2025 11:55 AM 18.6 13.0 - 44.0 % Final   03/10/2025 12:30 PM 20.3 13.0 - 44.0 % Final   01/05/2025 01:57 AM 19.7 13.0 - 44.0 % Final     Monocytes %, Manual   Date/Time Value Ref Range Status   05/07/2025 03:36 AM 5.9 2.0 - 10.0 % Final     Monocytes %   Date/Time Value Ref Range Status   05/06/2025 11:55 AM 8.0 2.0 - 10.0 % Final   03/10/2025 12:30 PM 8.2 2.0 - 10.0 % Final   01/05/2025 01:57 AM 9.2 2.0 - 10.0 % Final     Eosinophils %, Manual   Date/Time Value Ref Range Status   05/07/2025 03:36 AM 0.9 0.0 - 6.0 % Final     Eosinophils %   Date/Time Value Ref Range Status   05/06/2025 11:55 AM 1.1 0.0 - 6.0 % Final   03/10/2025 12:30 PM  3.5 0.0 - 6.0 % Final   01/05/2025 01:57 AM 1.6 0.0 - 6.0 % Final     Basophils %, Manual   Date/Time Value Ref Range Status   05/07/2025 03:36 AM 0.8 0.0 - 2.0 % Final     Basophils %   Date/Time Value Ref Range Status   05/06/2025 11:55 AM 0.5 0.0 - 2.0 % Final   03/10/2025 12:30 PM 0.9 0.0 - 2.0 % Final   01/05/2025 01:57 AM 0.6 0.0 - 2.0 % Final     Neutrophils Absolute   Date/Time Value Ref Range Status   05/06/2025 11:55 AM 6.09 (H) 1.60 - 5.50 x10*3/uL Final     Comment:     Percent differential counts (%) should be interpreted in the context of the absolute cell counts (cells/uL).   03/10/2025 12:30 PM 5.11 1.60 - 5.50 x10*3/uL Final     Comment:     Percent differential counts (%) should be interpreted in the context of the absolute cell counts (cells/uL).   01/05/2025 01:57 AM 8.51 (H) 1.60 - 5.50 x10*3/uL Final     Comment:     Percent differential counts (%) should be interpreted in the context of the absolute cell counts (cells/uL).     Immature Granulocytes Absolute, Automated   Date/Time Value Ref Range Status   05/07/2025 03:36 AM 0.02 0.00 - 0.50 x10*3/uL Final   05/06/2025 11:55 AM 0.02 0.00 - 0.50 x10*3/uL Final   03/10/2025 12:30 PM 0.02 0.00 - 0.50 x10*3/uL Final     Lymphocytes Absolute   Date/Time Value Ref Range Status   05/06/2025 11:55 AM 1.58 0.80 - 3.00 x10*3/uL Final   03/10/2025 12:30 PM 1.55 0.80 - 3.00 x10*3/uL Final   01/05/2025 01:57 AM 2.49 0.80 - 3.00 x10*3/uL Final     Monocytes Absolute   Date/Time Value Ref Range Status   05/06/2025 11:55 AM 0.68 0.05 - 0.80 x10*3/uL Final   03/10/2025 12:30 PM 0.63 0.05 - 0.80 x10*3/uL Final   01/05/2025 01:57 AM 1.17 (H) 0.05 - 0.80 x10*3/uL Final     Eosinophils Absolute   Date/Time Value Ref Range Status   05/06/2025 11:55 AM 0.09 0.00 - 0.40 x10*3/uL Final   03/10/2025 12:30 PM 0.27 0.00 - 0.40 x10*3/uL Final   01/05/2025 01:57 AM 0.20 0.00 - 0.40 x10*3/uL Final     Eosinophils Absolute, Manual   Date/Time Value Ref Range Status  "  05/07/2025 03:36 AM 0.05 0.00 - 0.40 x10*3/uL Final     Basophils Absolute   Date/Time Value Ref Range Status   05/06/2025 11:55 AM 0.04 0.00 - 0.10 x10*3/uL Final   03/10/2025 12:30 PM 0.07 0.00 - 0.10 x10*3/uL Final   01/05/2025 01:57 AM 0.07 0.00 - 0.10 x10*3/uL Final     Basophils Absolute, Manual   Date/Time Value Ref Range Status   05/07/2025 03:36 AM 0.05 0.00 - 0.10 x10*3/uL Final       No components found for: \"PT\"  aPTT   Date/Time Value Ref Range Status   05/07/2025 02:56 PM 35 26 - 36 seconds Final   05/07/2025 03:36 AM 39 (H) 26 - 36 seconds Final   05/06/2025 11:55 AM 56 (H) 26 - 36 seconds Final       Assessment/Plan      Lung nodule : PET SCAN ordered: RTC 4 weeks.  Hemostatic management as per Cardiology..    6/17/2023: PET scan reviewed:IMPRESSION: 1. Mildly FDG avid pulmonary nodule along left major fissure as well as other FDG avid nodules scattered in the rest left lung, likely infective/inflammatory etiology with neoplasm can not be excluded. Follow-up with diagnostic CT chest to document interval resolution/stability is recommended. 2. Multiple mildly FDG avid mediastinal and bilateral hilar lymph nodes, likely reactive. 3. No other concerning FDG avid disease identified.          Signed by: Ronen Herring 6/3/2025    Dedicated CT chest report pending at this time. Repeat CT Chest in 3 months.     Diagnoses and all orders for this visit:  Malignant neoplasm of upper respiratory tract, part unspecified  -     Clinic Appointment Request Follow Up (review pet)  -     CT chest w IV contrast; Future  -     azithromycin (Zithromax) 250 mg tablet; Take 1 tablet (250 mg) by mouth once daily for 5 days.  -     Clinic Appointment Request Follow Up (review CT); Future           Clemente-Logan Rivera MD                              [1]   Family History  Problem Relation Name Age of Onset    Heart attack Mother      Heart attack Father      Heart attack Other Aunt      "

## 2025-06-17 NOTE — PATIENT INSTRUCTIONS
Today you met with your hematologist/oncologist.  Recent labs were discussed and questions answered.  Scheduling orders were placed.  While we appreciate that you verbalized understanding, if any questions arise after leaving, please do not hesitate to call the office to discuss.  [phone] Siomara Rivera will see you in 3 months after a CT scan. Please take an antibiotic a week prior to the CT. This has been ordered for you and can be picked up from your pharmacy.

## 2025-06-18 ENCOUNTER — OFFICE VISIT (OUTPATIENT)
Dept: CARDIOLOGY | Facility: HOSPITAL | Age: 80
End: 2025-06-18
Payer: MEDICARE

## 2025-06-18 VITALS
BODY MASS INDEX: 20.24 KG/M2 | SYSTOLIC BLOOD PRESSURE: 147 MMHG | WEIGHT: 147.49 LBS | OXYGEN SATURATION: 97 % | DIASTOLIC BLOOD PRESSURE: 72 MMHG | HEART RATE: 71 BPM

## 2025-06-18 DIAGNOSIS — I25.10 CORONARY ARTERY DISEASE, UNSPECIFIED VESSEL OR LESION TYPE, UNSPECIFIED WHETHER ANGINA PRESENT, UNSPECIFIED WHETHER NATIVE OR TRANSPLANTED HEART: ICD-10-CM

## 2025-06-18 DIAGNOSIS — R06.02 SHORTNESS OF BREATH: Primary | ICD-10-CM

## 2025-06-18 DIAGNOSIS — I10 ESSENTIAL HYPERTENSION: ICD-10-CM

## 2025-06-18 DIAGNOSIS — I48.0 PAROXYSMAL ATRIAL FIBRILLATION (MULTI): ICD-10-CM

## 2025-06-18 PROCEDURE — 1159F MED LIST DOCD IN RCRD: CPT | Performed by: NURSE PRACTITIONER

## 2025-06-18 PROCEDURE — G2211 COMPLEX E/M VISIT ADD ON: HCPCS | Performed by: NURSE PRACTITIONER

## 2025-06-18 PROCEDURE — 99214 OFFICE O/P EST MOD 30 MIN: CPT | Performed by: NURSE PRACTITIONER

## 2025-06-18 PROCEDURE — 3077F SYST BP >= 140 MM HG: CPT | Performed by: NURSE PRACTITIONER

## 2025-06-18 PROCEDURE — 99212 OFFICE O/P EST SF 10 MIN: CPT

## 2025-06-18 PROCEDURE — 3078F DIAST BP <80 MM HG: CPT | Performed by: NURSE PRACTITIONER

## 2025-06-18 NOTE — PROGRESS NOTES
History Of Present Illness:    Rick Arauz is a 80 y.o. male with a history of PAF (Xarelto), CAD s/p PCI mid RCA, and HLD, he is here for a follow up visit. The patient is seen in collaboration with Dr. Blackwell. Fell in January had cervical surgery. Treated for pneumonia. Complains of leg and feet swelling. On May 7th taken to the ER with diarrhea and dehydration. He stopped the vitamin K. States he is feeling better, eating better. Started on Lasix, swelling in legs has improved. Complains of dizziness and difficulty with balance.     Review of Systems   Constitutional: Negative.   HENT: Negative.     Eyes: Negative.    Cardiovascular: Negative.    Respiratory: Negative.     Endocrine: Negative.    Skin: Negative.    Musculoskeletal: Negative.    Neurological: Negative.    Psychiatric/Behavioral: Negative.     Allergic/Immunologic: Negative.    All other systems reviewed and are negative.       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    Past Surgical History:  He has a past surgical history that includes MR angio head wo IV contrast (11/04/2018); MR angio neck wo IV contrast (11/04/2018); Cardiac catheterization (N/A, 11/17/2023); Other surgical history; Other surgical history (Bilateral); Other surgical history; Other surgical history; Other surgical history; and Anterior cervical discectomy w/ fusion (03/21/2025).      Social History:  He reports that he quit smoking about 52 years ago. His smoking use included cigarettes. He has never used smokeless tobacco. He reports current alcohol use. He reports that he does not use drugs.    Family History:  Family History   Problem Relation Name Age of Onset    Heart attack Mother      Heart attack Father      Heart attack Other Aunt         Allergies:  Nsaids (non-steroidal  anti-inflammatory drug)    Outpatient Medications:  Current Outpatient Medications   Medication Instructions    albuterol (Ventolin HFA) 90 mcg/actuation inhaler 2 puffs, 4 times daily PRN    aspirin 81 mg EC tablet 1 tablet, Daily    atorvastatin (LIPITOR) 40 mg, oral, Nightly    azithromycin (ZITHROMAX) 250 mg, oral, Daily    cyclobenzaprine (FLEXERIL) 5 mg, oral, Nightly PRN    dexlansoprazole (Dexilant) 60 mg DR capsule 1 capsule, Daily    furosemide (LASIX) 20 mg, Daily PRN    metoprolol succinate XL (Toprol-XL) 100 mg 24 hr tablet 0.5 tablets, Daily    morphine CR (MS Contin) 30 mg 12 hr tablet TAKE ONE TABLET BY MOUTH EVERY 8 HOURS FOR PAIN -TAKE 1 TABLET TWICE A DAY SCHEDULED AND MAY TAKE ONE EXTRA DOSE OF MORPHINE SA IN THE AFTERNOON IF NEEDED FOR PAIN -TAKE 1 TABLET TWICE A DAY SCHEDULED AND MAY TAKE ONE EXTRA DOSE OF MORPHINE SA IN THE AFTERNOON IF NEEDED FOR PAIN    naloxone (NARCAN) 4 mg, nasal, As needed, May repeat every 2-3 minutes if needed, alternating nostrils, until medical assistance becomes available.    phytonadione (VITAMIN K) 2.5 mg, oral, Every other day    rivaroxaban (XARELTO) 20 mg, oral, Daily    tiZANidine (ZANAFLEX) 4 mg, oral, Every 6 hours PRN        Last Recorded Vitals:  Vitals:    06/18/25 1454   BP: 147/72   Pulse: 71   SpO2: 97%   Weight: 66.9 kg (147 lb 7.8 oz)         Physical Exam  Vitals reviewed.   HENT:      Head: Normocephalic.      Nose: Nose normal.   Eyes:      Pupils: Pupils are equal, round, and reactive to light.   Cardiovascular:      Rate and Rhythm: Normal rate and regular rhythm.   Pulmonary:      Effort: Pulmonary effort is normal.      Breath sounds: Normal breath sounds.   Abdominal:      General: Abdomen is flat.      Palpations: Abdomen is soft.   Musculoskeletal:         General: Normal range of motion.      Cervical back: Normal range of motion.   Skin:     General: Skin is warm and dry.   Neurological:      General: No focal deficit present.      Mental  Status: He is alert and oriented to person, place, and time.   Psychiatric:         Mood and Affect: Mood normal.         Behavior: Behavior normal.       Last Labs:  CBC -  Lab Results   Component Value Date    WBC 5.9 05/07/2025    HGB 11.6 (L) 05/07/2025    HCT 36.4 (L) 05/07/2025     (H) 05/07/2025     05/07/2025       CMP -  Lab Results   Component Value Date    CALCIUM 8.1 (L) 05/07/2025    PHOS 2.9 05/07/2025    PROT 4.9 (L) 05/07/2025    ALBUMIN 2.7 (L) 05/07/2025    AST 13 05/07/2025    ALT 7 (L) 05/07/2025    ALKPHOS 89 05/07/2025    BILITOT 0.5 05/07/2025       LIPID PANEL -   Lab Results   Component Value Date    CHOL 124 04/12/2019    TRIG 73 04/12/2019    HDL 61.1 04/12/2019    CHHDL 2.0 04/12/2019    LDLF 48 04/12/2019    VLDL 15 04/12/2019       RENAL FUNCTION PANEL -   Lab Results   Component Value Date    GLUCOSE 89 05/07/2025     05/07/2025    K 4.0 05/07/2025     05/07/2025    CO2 21 05/07/2025    ANIONGAP 13 05/07/2025    BUN 14 05/07/2025    CREATININE 0.81 05/07/2025    CALCIUM 8.1 (L) 05/07/2025    PHOS 2.9 05/07/2025    ALBUMIN 2.7 (L) 05/07/2025        Lab Results   Component Value Date     (H) 01/05/2025    HGBA1C 5.8 (H) 03/10/2025       Last Cardiology Tests:  ECG:from today showed sinus rhythm heart rate 67 bpm       Echo: 4/30/19  1. The left ventricular systolic function is normal with a 55-60% estimated ejection fraction.   2. There is mild mitral, tricuspid, and pulmonic regurgitation.   3. The left atrium is mildly dilated.    Outside hospital records obtained:  Echo from January 22, 2018:  Ejection fraction 62%  Normal left atrial size  No significant valvular abnormalities      Cath:  Cath 11/17/2023  1. Left main: no significant angiographic disease.   2. LAD: mild-moderate proximal calcification with 10-20% disease.   3. LCx: 30% mid-vessel tubular stenosis.   4. RCA: patent mid-vessel stent, no significant angiographic disease.   5. LVEDP  8mmHg, no aortic stenosis on LV-Ao gradient.    8/25/15  PCI mid RCA (Xience ALIAD)       Stress Test:  Nuclear stress test 10/2023  1.  Moderate reversible defect along the inferior wall limited by  significant bowel artifact particularly on stress imaging. The  findings raise concern for possible mild ischemia, findings may be  artifactual in nature. Correlation with clinical suspicion is  recommended. If there is a high clinical suspicion, correlation with  an ammonia PET-CT may be of value.  2. The left ventricle is normal in size.  3. Normal LV wall motion with an LV EF estimated at greater than 65%.    Nuclear stress test from October 2, 2018:  No stress-induced ischemia  Ejection fraction 62%       Cardiac Imaging:      Assessment/Plan   Very pleasant 80 year old gentleman with paroxysmal Afib s/p ablation (6/2019), CAD s/p PCI mid RCA (Xience ALIDA) 8/25/2015, and Lyme disease. He fell in January had surgery on his neck, several readmissions, coagulopathy and was started on Potassium. He stopped the Potassium due to diarrhea. States he is feeling better eating better. He has had increased swelling in his legs. The Lasix will be increased to 40 mg daily for three days and then back to 20 mg daily. He will have a repeat echocardiogram to assess LV function. Continues to stay active. Heart rate and blood pressure is well controlled today.      Plan   -Continue Metoprolol ER 50 mg once a day   -Continue Aspirin 81 mg daily indefinitely   -Continue Atorvastatin 40 mg daily and Xarelto 20 mg daily   -call with any questions   -increase the Lasix to 40 mg daily for three days and then back to 20 mg daily   -echocardiogram   -follow up in October   -elevate legs   -he was instructed to stay off of the Potassium         Elaine Adair, APRJOANIE-CNP

## 2025-06-18 NOTE — PATIENT INSTRUCTIONS
FOLLOW UP IN OCTOBER   ECHOCARDIOGRAM   ELEVATE LEGS   INCREASE THE LASIX TO 40 MG DAILY FOR THREE DAYS AND THEN BACK TO 20 MG DAILY   CALL WITH ANY QUESTIONS

## 2025-06-23 ENCOUNTER — HOSPITAL ENCOUNTER (OUTPATIENT)
Dept: RADIOLOGY | Facility: HOSPITAL | Age: 80
Discharge: HOME | End: 2025-06-23
Payer: MEDICARE

## 2025-06-23 DIAGNOSIS — Z98.1 STATUS POST CERVICAL SPINAL FUSION: Primary | ICD-10-CM

## 2025-06-23 DIAGNOSIS — Z98.1 STATUS POST CERVICAL SPINAL FUSION: ICD-10-CM

## 2025-06-23 PROCEDURE — 72050 X-RAY EXAM NECK SPINE 4/5VWS: CPT

## 2025-06-23 PROCEDURE — 72050 X-RAY EXAM NECK SPINE 4/5VWS: CPT | Performed by: RADIOLOGY

## 2025-06-23 PROCEDURE — 72040 X-RAY EXAM NECK SPINE 2-3 VW: CPT

## 2025-06-26 ENCOUNTER — OFFICE VISIT (OUTPATIENT)
Facility: CLINIC | Age: 80
End: 2025-06-26
Payer: MEDICARE

## 2025-06-26 VITALS
HEART RATE: 72 BPM | DIASTOLIC BLOOD PRESSURE: 70 MMHG | TEMPERATURE: 98.4 F | BODY MASS INDEX: 19.76 KG/M2 | HEIGHT: 72 IN | WEIGHT: 145.9 LBS | SYSTOLIC BLOOD PRESSURE: 152 MMHG

## 2025-06-26 DIAGNOSIS — Z98.1 STATUS POST CERVICAL SPINAL FUSION: Primary | ICD-10-CM

## 2025-06-26 PROCEDURE — 3078F DIAST BP <80 MM HG: CPT | Performed by: NEUROLOGICAL SURGERY

## 2025-06-26 PROCEDURE — 1125F AMNT PAIN NOTED PAIN PRSNT: CPT | Performed by: NEUROLOGICAL SURGERY

## 2025-06-26 PROCEDURE — 1036F TOBACCO NON-USER: CPT | Performed by: NEUROLOGICAL SURGERY

## 2025-06-26 PROCEDURE — 99211 OFF/OP EST MAY X REQ PHY/QHP: CPT | Performed by: NEUROLOGICAL SURGERY

## 2025-06-26 PROCEDURE — 1159F MED LIST DOCD IN RCRD: CPT | Performed by: NEUROLOGICAL SURGERY

## 2025-06-26 PROCEDURE — 3077F SYST BP >= 140 MM HG: CPT | Performed by: NEUROLOGICAL SURGERY

## 2025-06-26 ASSESSMENT — PAIN SCALES - GENERAL: PAINLEVEL_OUTOF10: 4

## 2025-06-26 NOTE — PROGRESS NOTES
[facility] [place]  Department of Neurological Surgery  Post Operative Patient Visit      History of Present Illness:  Naveen Arauz is a 80 y.o. year old male who presents to the spine clinic for a post operative visit. They are status post C5-6 ACDF on 2025  For treatment of a unstable C5-6 anterolisthesis.  I previously performed an emergent C4-7 posterior decompression in 2025 after the patient had presented to the emergency department following a fall with central cord syndrome.  At this time, the patient states that his neck pain has resolved.  He does still have some paresthesias down his left arm as well as weakness in his finger abduction bilaterally.  He has a history of swallowing dysfunction, which was worse after his most recent surgery, but this has since improved.     systems reviewed and negative other than what is listed in the history of present illness    Problem List[1]  Medical History[2]  Surgical History[3]  Social History     Tobacco Use    Smoking status: Former     Current packs/day: 0.00     Types: Cigarettes     Quit date:      Years since quittin.5    Smokeless tobacco: Never   Substance Use Topics    Alcohol use: Yes     Comment: Occasional     family history includes Heart attack in his father, mother, and another family member.  Current Medications[4]  Allergies[5]    Physical Examination:  General: well developed, awake/alert/oriented x3, no distress, alert and cooperative  Head/Neck: neck Supple, no apparent injury  ENMT: mucous membranes moist, no apparent injury, no lesions seen  Cardiovascular: no pitting edema, no JVD  Respiratory/Thorax: Normal breath sounds with good chest expansion, thorax symmetric  Skin: well-healed incision    Neurological/Musculoskeletal:    - Posture: normal coronal & sagittal alignment    - Range of motion: full active & passive range of motion    - Muscle Bulk: normal and symmetric in all  extremities    - Paraspinal muscle spasm/tenderness absent    - Motor Strength: 4+/5 strength in left hand , 2/5 strength in bilateral finger abduction, otherwise 5/5 strength throughout bilateral upper extremities decreased over the left forearm and hand    - Sensation: intact to light touch    - Reflexes: negative Samuel's sign      Results  I personally reviewed and interpreted the imaging results, which included x-rays of the cervical spine performed on June 23, 2025.  These show stable and well-positioned instrumentation at C5-6.    Assessment/Plan   Naveen Arauz is a 80 y.o. year old male who presents to the spine clinic for a post operative visit. They are status post C5-6 ACDF on March 21, 2025  For treatment of a unstable C5-6 anterolisthesis.  I previously performed an emergent C4-7 posterior decompression in January 2025 after the patient had presented to the emergency department following a fall with central cord syndrome.  At this time, the patient states that his neck pain has resolved.  He does still have some paresthesias down his left arm as well as weakness in his finger abduction bilaterally.  He has a history of swallowing dysfunction, which was worse after his most recent surgery, but this has since improved.  His recent x-rays look good.  At this time, encouraged patient to continue to advance his activity level as tolerated.  I told him that he still may see some improvement in his paresthesias over time.  Overall, I think he has made a excellent recovery following his spinal cord injury.  I told that it was okay for him to proceed with esophageal dilatation for treatment of his known esophageal strictures.      The above clinical summary has been dictated with voice recognition software. It has not been proofread for grammatical errors, typographical mistakes, or other semantic inconsistencies.    Thank you for visiting our office today. It was our pleasure to take part in your  healthcare.     Do not hesitate to call with any questions regarding your plan of care after leaving at [phone] M-F 8am-4pm.     To clinicians, thank you very much for this kind referral. It is a privilege to partner with you in the care of your patients. My office would be delighted to assist you with any further consultations or with questions regarding the plan of care outlined. Do not hesitate to call the office or contact me directly.       Sincerely,      Ramiro Perez MD PhD  Attending Neurosurgeon  [facility]   of Neurological Surgery  [employer]    [facility]  [address]  [place] [address]    [facility]  [address] [place] [address]  [address]  [place] [address]    Office: [phone]  Fax: [phone]           [1]   Patient Active Problem List  Diagnosis    Paroxysmal atrial fibrillation (Multi)    Coronary artery disease    Disorder of rotator cuff    Impingement syndrome of shoulder region    Disorder of tendon of biceps    Essential hypertension    Gastroesophageal reflux disease    Hemangioma of skin and subcutaneous tissue    History of coronary artery stent placement    Lyme disease    Melanocytic nevi of trunk    Melanocytic nevi of unspecified lower limb, including hip    Melanocytic nevi of unspecified upper limb, including shoulder    Dermatitis, unspecified    Other hypertrophic disorders of the skin    Other melanin hyperpigmentation    Actinic keratosis    Other seborrheic keratosis    Abnormal compliance of bladder    BPH (benign prostatic hyperplasia)    Urethral stricture    Fall    Closed head injury    Closed nondisplaced fracture of sixth cervical vertebra    RSV (respiratory syncytial virus infection)    Pneumonia of both lower lobes due to infectious organism    Fracture of unspecified parts of  lumbosacral spine and pelvis, initial encounter for closed fracture (Multi)    Epidural hematoma (Multi)    Acquired spondylolisthesis of cervical vertebra    Central cord syndrome (Multi)    H/O excision of lamina of cervical vertebra for decompression of spinal cord    Cervical myelopathy    Elevated INR    Diarrhea due to drug    Dilatation of thoracic aorta    Coagulopathy (Multi)   [2]   Past Medical History:  Diagnosis Date    A-fib (Multi)     Arrhythmia     Blood clot in spinal cord artery (Multi)     Coronary artery disease     Gout     Hyperlipidemia     Hypertension     Transient cerebral ischemic attack, unspecified     TIA (transient ischemic attack)   [3]   Past Surgical History:  Procedure Laterality Date    ANTERIOR CERVICAL DISCECTOMY W/ FUSION  03/21/2025    C5-6 anterior cervical discectomy fusion, with cervical traction (Neck)    CARDIAC CATHETERIZATION N/A 11/17/2023    Procedure: Left Heart Cath;  Surgeon: Manuel Blackwell MD;  Location: [facility];  Service: Cardiovascular;  Laterality: N/A;    MR HEAD ANGIO WO IV CONTRAST  11/04/2018    MR HEAD ANGIO WO IV CONTRAST 11/4/2018 [facility]    MR NECK ANGIO WO IV CONTRAST  11/04/2018    MR NECK ANGIO WO IV CONTRAST 11/4/2018 [facility]    OTHER SURGICAL HISTORY      cervical fusion    OTHER SURGICAL HISTORY Bilateral     Knee surgery    OTHER SURGICAL HISTORY      Cardiac catheterization with stent placement    OTHER SURGICAL HISTORY      Stomach surgery    OTHER SURGICAL HISTORY      Back surgery   [4]   Current Outpatient Medications:     albuterol (Ventolin HFA) 90 mcg/actuation inhaler, Inhale 2 puffs 4 times a day as needed for wheezing or shortness of breath., Disp: , Rfl:     aspirin 81 mg EC tablet, Take 1 tablet (81 mg) by mouth once daily., Disp: , Rfl:     atorvastatin (Lipitor) 40 mg tablet, TAKE 1 TABLET BY MOUTH EVERY NIGHT AT BEDTIME, Disp: 90 tablet, Rfl: 1    dexlansoprazole (Dexilant) 60 mg   capsule, Take 1 capsule (60 mg) by mouth once daily., Disp: , Rfl:     furosemide (Lasix) 20 mg tablet, Take 1 tablet (20 mg) by mouth once daily as needed. FOR LEG SWELLING, Disp: , Rfl:     metoprolol succinate XL (Toprol-XL) 100 mg 24 hr tablet, Take 0.5 tablets (50 mg) by mouth once daily. Do not crush or chew., Disp: , Rfl:     morphine CR (MS Contin) 30 mg 12 hr tablet, TAKE ONE TABLET BY MOUTH EVERY 8 HOURS FOR PAIN -TAKE 1 TABLET TWICE A DAY SCHEDULED AND MAY TAKE ONE EXTRA DOSE OF MORPHINE SA IN THE AFTERNOON IF NEEDED FOR PAIN -TAKE 1 TABLET TWICE A DAY SCHEDULED AND MAY TAKE ONE EXTRA DOSE OF MORPHINE SA IN THE AFTERNOON IF NEEDED FOR PAIN, Disp: , Rfl:     naloxone (Narcan) 4 mg/0.1 mL nasal spray, Administer 1 spray (4 mg) into affected nostril(s) if needed for opioid reversal. May repeat every 2-3 minutes if needed, alternating nostrils, until medical assistance becomes available., Disp: 2 each, Rfl: 0    rivaroxaban (Xarelto) 20 mg tablet, Take 1 tablet (20 mg) by mouth once daily. Do not fill before March 28, 2025., Disp: 30 tablet, Rfl: 0  [5]   Allergies  Allergen Reactions    Nsaids (Non-Steroidal Anti-Inflammatory Drug) GI Upset and Nausea/vomiting     No NSAIDs due to stomach surgeries

## 2025-07-07 DIAGNOSIS — R91.1 LUNG NODULE: ICD-10-CM

## 2025-07-10 ENCOUNTER — APPOINTMENT (OUTPATIENT)
Dept: VASCULAR SURGERY | Facility: CLINIC | Age: 80
End: 2025-07-10
Payer: MEDICARE

## 2025-08-01 ENCOUNTER — TELEPHONE (OUTPATIENT)
Dept: HEMATOLOGY/ONCOLOGY | Facility: CLINIC | Age: 80
End: 2025-08-01
Payer: MEDICARE

## 2025-08-01 DIAGNOSIS — C39.0: ICD-10-CM

## 2025-08-01 NOTE — TELEPHONE ENCOUNTER
Iron and ferritin labs ordered per Dr Rivera. Pt will have labs drawn this weekend and call the office Monday to see if he needs iron. Pts wife agreed to plan and verbalized understanding.

## 2025-08-01 NOTE — TELEPHONE ENCOUNTER
Reason for Conversation  Questioning Iron referal     Background   January from PCP Dr. Spangler's office called. They faxed over a referral for this patient to get Iron and and the doctor's office wanted a follow up. Please call her @ [phone] Ext [phone]    Disposition   No disposition on file.

## 2025-08-02 ENCOUNTER — LAB (OUTPATIENT)
Dept: LAB | Facility: HOSPITAL | Age: 80
End: 2025-08-02
Payer: MEDICARE

## 2025-08-02 DIAGNOSIS — C39.0: Primary | ICD-10-CM

## 2025-08-02 LAB
FERRITIN SERPL-MCNC: 47 NG/ML (ref 20–300)
IRON SATN MFR SERPL: 16 % (ref 25–45)
IRON SERPL-MCNC: 42 UG/DL (ref 35–150)
TIBC SERPL-MCNC: 259 UG/DL (ref 240–445)
UIBC SERPL-MCNC: 217 UG/DL (ref 110–370)

## 2025-08-02 PROCEDURE — 82728 ASSAY OF FERRITIN: CPT

## 2025-08-02 PROCEDURE — 36415 COLL VENOUS BLD VENIPUNCTURE: CPT

## 2025-08-02 PROCEDURE — 83550 IRON BINDING TEST: CPT

## 2025-08-02 PROCEDURE — 83540 ASSAY OF IRON: CPT

## 2025-08-04 ENCOUNTER — TELEPHONE (OUTPATIENT)
Dept: HEMATOLOGY/ONCOLOGY | Facility: CLINIC | Age: 80
End: 2025-08-04
Payer: MEDICARE

## 2025-08-04 PROBLEM — D50.1 IRON DEFICIENCY ANEMIA DUE TO SIDEROPENIC DYSPHAGIA: Status: ACTIVE | Noted: 2025-08-04

## 2025-08-04 NOTE — TELEPHONE ENCOUNTER
Reason for Conversation  Results    Background   Calling to obtain recent lab results to see if he needs and iron infusion.  Please call    Disposition   No disposition on file.

## 2025-08-14 ENCOUNTER — INFUSION (OUTPATIENT)
Dept: HEMATOLOGY/ONCOLOGY | Facility: CLINIC | Age: 80
End: 2025-08-14
Payer: MEDICARE

## 2025-08-14 VITALS
SYSTOLIC BLOOD PRESSURE: 124 MMHG | BODY MASS INDEX: 19.56 KG/M2 | OXYGEN SATURATION: 96 % | RESPIRATION RATE: 16 BRPM | WEIGHT: 144.2 LBS | DIASTOLIC BLOOD PRESSURE: 57 MMHG | HEART RATE: 86 BPM | TEMPERATURE: 97 F

## 2025-08-14 DIAGNOSIS — D50.1 IRON DEFICIENCY ANEMIA DUE TO SIDEROPENIC DYSPHAGIA: ICD-10-CM

## 2025-08-14 DIAGNOSIS — E61.1 IRON DEFICIENCY: ICD-10-CM

## 2025-08-14 PROCEDURE — 96365 THER/PROPH/DIAG IV INF INIT: CPT | Mod: INF

## 2025-08-14 PROCEDURE — 2500000004 HC RX 250 GENERAL PHARMACY W/ HCPCS (ALT 636 FOR OP/ED): Mod: JZ,TB | Performed by: INTERNAL MEDICINE

## 2025-08-14 RX ORDER — ALBUTEROL SULFATE 0.83 MG/ML
3 SOLUTION RESPIRATORY (INHALATION) AS NEEDED
OUTPATIENT
Start: 2025-08-21

## 2025-08-14 RX ORDER — HEPARIN 100 UNIT/ML
500 SYRINGE INTRAVENOUS AS NEEDED
OUTPATIENT
Start: 2025-08-14

## 2025-08-14 RX ORDER — EPINEPHRINE 0.3 MG/.3ML
0.3 INJECTION SUBCUTANEOUS EVERY 5 MIN PRN
Status: DISCONTINUED | OUTPATIENT
Start: 2025-08-14 | End: 2025-08-14 | Stop reason: HOSPADM

## 2025-08-14 RX ORDER — HEPARIN SODIUM,PORCINE/PF 10 UNIT/ML
50 SYRINGE (ML) INTRAVENOUS AS NEEDED
Status: DISCONTINUED | OUTPATIENT
Start: 2025-08-14 | End: 2025-08-14 | Stop reason: HOSPADM

## 2025-08-14 RX ORDER — FAMOTIDINE 10 MG/ML
20 INJECTION, SOLUTION INTRAVENOUS ONCE AS NEEDED
OUTPATIENT
Start: 2025-08-21

## 2025-08-14 RX ORDER — HEPARIN SODIUM,PORCINE/PF 10 UNIT/ML
50 SYRINGE (ML) INTRAVENOUS AS NEEDED
OUTPATIENT
Start: 2025-08-14

## 2025-08-14 RX ORDER — ALBUTEROL SULFATE 0.83 MG/ML
3 SOLUTION RESPIRATORY (INHALATION) AS NEEDED
Status: DISCONTINUED | OUTPATIENT
Start: 2025-08-14 | End: 2025-08-14 | Stop reason: HOSPADM

## 2025-08-14 RX ORDER — DIPHENHYDRAMINE HYDROCHLORIDE 50 MG/ML
50 INJECTION, SOLUTION INTRAMUSCULAR; INTRAVENOUS AS NEEDED
Status: DISCONTINUED | OUTPATIENT
Start: 2025-08-14 | End: 2025-08-14 | Stop reason: HOSPADM

## 2025-08-14 RX ORDER — FAMOTIDINE 10 MG/ML
20 INJECTION, SOLUTION INTRAVENOUS ONCE AS NEEDED
Status: DISCONTINUED | OUTPATIENT
Start: 2025-08-14 | End: 2025-08-14 | Stop reason: HOSPADM

## 2025-08-14 RX ORDER — DIPHENHYDRAMINE HYDROCHLORIDE 50 MG/ML
50 INJECTION, SOLUTION INTRAMUSCULAR; INTRAVENOUS AS NEEDED
OUTPATIENT
Start: 2025-08-21

## 2025-08-14 RX ORDER — HEPARIN 100 UNIT/ML
500 SYRINGE INTRAVENOUS AS NEEDED
Status: DISCONTINUED | OUTPATIENT
Start: 2025-08-14 | End: 2025-08-14 | Stop reason: HOSPADM

## 2025-08-14 RX ORDER — EPINEPHRINE 0.3 MG/.3ML
0.3 INJECTION SUBCUTANEOUS EVERY 5 MIN PRN
OUTPATIENT
Start: 2025-08-21

## 2025-08-14 RX ADMIN — FERUMOXYTOL 510 MG: 510 INJECTION INTRAVENOUS at 14:23

## 2025-08-14 ASSESSMENT — PAIN SCALES - GENERAL: PAINLEVEL_OUTOF10: 2

## 2025-08-18 ENCOUNTER — HOSPITAL ENCOUNTER (EMERGENCY)
Facility: HOSPITAL | Age: 80
Discharge: HOME | End: 2025-08-18
Payer: MEDICARE

## 2025-08-18 ENCOUNTER — ANCILLARY PROCEDURE (OUTPATIENT)
Dept: URGENT CARE | Facility: URGENT CARE | Age: 80
End: 2025-08-18
Payer: MEDICARE

## 2025-08-18 ENCOUNTER — OFFICE VISIT (OUTPATIENT)
Dept: URGENT CARE | Facility: URGENT CARE | Age: 80
End: 2025-08-18
Payer: MEDICARE

## 2025-08-18 ENCOUNTER — NURSE TRIAGE (OUTPATIENT)
Dept: HEMATOLOGY/ONCOLOGY | Facility: CLINIC | Age: 80
End: 2025-08-18
Payer: MEDICARE

## 2025-08-18 VITALS
SYSTOLIC BLOOD PRESSURE: 126 MMHG | BODY MASS INDEX: 19.26 KG/M2 | WEIGHT: 142 LBS | HEART RATE: 95 BPM | OXYGEN SATURATION: 94 % | TEMPERATURE: 98.2 F | DIASTOLIC BLOOD PRESSURE: 75 MMHG | RESPIRATION RATE: 18 BRPM

## 2025-08-18 VITALS
HEIGHT: 72 IN | RESPIRATION RATE: 18 BRPM | DIASTOLIC BLOOD PRESSURE: 81 MMHG | OXYGEN SATURATION: 99 % | HEART RATE: 68 BPM | WEIGHT: 141.98 LBS | SYSTOLIC BLOOD PRESSURE: 145 MMHG | BODY MASS INDEX: 19.23 KG/M2 | TEMPERATURE: 98.2 F

## 2025-08-18 DIAGNOSIS — R05.1 ACUTE COUGH: ICD-10-CM

## 2025-08-18 DIAGNOSIS — J18.9 PNEUMONIA OF RIGHT MIDDLE LOBE DUE TO INFECTIOUS ORGANISM: Primary | ICD-10-CM

## 2025-08-18 DIAGNOSIS — R06.09 DYSPNEA ON EXERTION: Primary | ICD-10-CM

## 2025-08-18 DIAGNOSIS — R06.09 DYSPNEA ON EXERTION: ICD-10-CM

## 2025-08-18 DIAGNOSIS — U07.1 COVID: ICD-10-CM

## 2025-08-18 DIAGNOSIS — R09.89 ABNORMAL LUNG SOUNDS: ICD-10-CM

## 2025-08-18 LAB
ALBUMIN SERPL BCP-MCNC: 3.1 G/DL (ref 3.4–5)
ALP SERPL-CCNC: 130 U/L (ref 33–136)
ALT SERPL W P-5'-P-CCNC: 33 U/L (ref 10–52)
ANION GAP BLDV CALCULATED.4IONS-SCNC: 4 MMOL/L (ref 10–25)
ANION GAP SERPL CALC-SCNC: 10 MMOL/L (ref 10–20)
AST SERPL W P-5'-P-CCNC: 61 U/L (ref 9–39)
BASE EXCESS BLDV CALC-SCNC: 6.1 MMOL/L (ref -2–3)
BASOPHILS # BLD AUTO: 0.01 X10*3/UL (ref 0–0.1)
BASOPHILS NFR BLD AUTO: 0.2 %
BILIRUB SERPL-MCNC: 0.5 MG/DL (ref 0–1.2)
BODY TEMPERATURE: ABNORMAL
BUN SERPL-MCNC: 7 MG/DL (ref 6–23)
CA-I BLDV-SCNC: 1.13 MMOL/L (ref 1.1–1.33)
CALCIUM SERPL-MCNC: 8.2 MG/DL (ref 8.6–10.3)
CHLORIDE BLDV-SCNC: 97 MMOL/L (ref 98–107)
CHLORIDE SERPL-SCNC: 96 MMOL/L (ref 98–107)
CO2 SERPL-SCNC: 28 MMOL/L (ref 21–32)
CREAT SERPL-MCNC: 0.71 MG/DL (ref 0.5–1.3)
EGFRCR SERPLBLD CKD-EPI 2021: >90 ML/MIN/1.73M*2
EOSINOPHIL # BLD AUTO: 0.01 X10*3/UL (ref 0–0.4)
EOSINOPHIL NFR BLD AUTO: 0.2 %
ERYTHROCYTE [DISTWIDTH] IN BLOOD BY AUTOMATED COUNT: 13.7 % (ref 11.5–14.5)
FLUAV RNA RESP QL NAA+PROBE: NOT DETECTED
FLUBV RNA RESP QL NAA+PROBE: NOT DETECTED
GLUCOSE BLDV-MCNC: 153 MG/DL (ref 74–99)
GLUCOSE SERPL-MCNC: 143 MG/DL (ref 74–99)
HCO3 BLDV-SCNC: 31.2 MMOL/L (ref 22–26)
HCT VFR BLD AUTO: 38.4 % (ref 41–52)
HCT VFR BLD EST: 39 % (ref 41–52)
HGB BLD-MCNC: 12.7 G/DL (ref 13.5–17.5)
HGB BLDV-MCNC: 13 G/DL (ref 13.5–17.5)
IMM GRANULOCYTES # BLD AUTO: 0.03 X10*3/UL (ref 0–0.5)
IMM GRANULOCYTES NFR BLD AUTO: 0.5 % (ref 0–0.9)
INHALED O2 CONCENTRATION: 21 %
LACTATE BLDV-SCNC: 1 MMOL/L (ref 0.4–2)
LYMPHOCYTES # BLD AUTO: 1.24 X10*3/UL (ref 0.8–3)
LYMPHOCYTES NFR BLD AUTO: 19.5 %
MAGNESIUM SERPL-MCNC: 1.74 MG/DL (ref 1.6–2.4)
MCH RBC QN AUTO: 32.3 PG (ref 26–34)
MCHC RBC AUTO-ENTMCNC: 33.1 G/DL (ref 32–36)
MCV RBC AUTO: 98 FL (ref 80–100)
MONOCYTES # BLD AUTO: 0.77 X10*3/UL (ref 0.05–0.8)
MONOCYTES NFR BLD AUTO: 12.1 %
NEUTROPHILS # BLD AUTO: 4.31 X10*3/UL (ref 1.6–5.5)
NEUTROPHILS NFR BLD AUTO: 67.5 %
NRBC BLD-RTO: 0 /100 WBCS (ref 0–0)
OXYHGB MFR BLDV: 72.8 % (ref 45–75)
PCO2 BLDV: 46 MM HG (ref 41–51)
PH BLDV: 7.44 PH (ref 7.33–7.43)
PLATELET # BLD AUTO: 140 X10*3/UL (ref 150–450)
PO2 BLDV: 42 MM HG (ref 35–45)
POTASSIUM BLDV-SCNC: 4 MMOL/L (ref 3.5–5.3)
POTASSIUM SERPL-SCNC: 3.9 MMOL/L (ref 3.5–5.3)
PROT SERPL-MCNC: 6.2 G/DL (ref 6.4–8.2)
RBC # BLD AUTO: 3.93 X10*6/UL (ref 4.5–5.9)
RSV RNA RESP QL NAA+PROBE: NOT DETECTED
SAO2 % BLDV: 74 % (ref 45–75)
SARS-COV-2 RNA RESP QL NAA+PROBE: DETECTED
SODIUM BLDV-SCNC: 128 MMOL/L (ref 136–145)
SODIUM SERPL-SCNC: 130 MMOL/L (ref 136–145)
WBC # BLD AUTO: 6.4 X10*3/UL (ref 4.4–11.3)

## 2025-08-18 PROCEDURE — 3078F DIAST BP <80 MM HG: CPT

## 2025-08-18 PROCEDURE — 84132 ASSAY OF SERUM POTASSIUM: CPT | Performed by: PHYSICIAN ASSISTANT

## 2025-08-18 PROCEDURE — 3074F SYST BP LT 130 MM HG: CPT

## 2025-08-18 PROCEDURE — 84132 ASSAY OF SERUM POTASSIUM: CPT | Mod: 59 | Performed by: PHYSICIAN ASSISTANT

## 2025-08-18 PROCEDURE — 87637 SARSCOV2&INF A&B&RSV AMP PRB: CPT | Performed by: PHYSICIAN ASSISTANT

## 2025-08-18 PROCEDURE — 1160F RVW MEDS BY RX/DR IN RCRD: CPT

## 2025-08-18 PROCEDURE — 99203 OFFICE O/P NEW LOW 30 MIN: CPT

## 2025-08-18 PROCEDURE — 85025 COMPLETE CBC W/AUTO DIFF WBC: CPT | Performed by: PHYSICIAN ASSISTANT

## 2025-08-18 PROCEDURE — 1159F MED LIST DOCD IN RCRD: CPT

## 2025-08-18 PROCEDURE — 36415 COLL VENOUS BLD VENIPUNCTURE: CPT | Performed by: PHYSICIAN ASSISTANT

## 2025-08-18 PROCEDURE — 71046 X-RAY EXAM CHEST 2 VIEWS: CPT

## 2025-08-18 PROCEDURE — 83735 ASSAY OF MAGNESIUM: CPT | Performed by: PHYSICIAN ASSISTANT

## 2025-08-18 PROCEDURE — 99283 EMERGENCY DEPT VISIT LOW MDM: CPT

## 2025-08-18 PROCEDURE — 93000 ELECTROCARDIOGRAM COMPLETE: CPT

## 2025-08-18 PROCEDURE — 2500000001 HC RX 250 WO HCPCS SELF ADMINISTERED DRUGS (ALT 637 FOR MEDICARE OP): Performed by: PHYSICIAN ASSISTANT

## 2025-08-18 RX ORDER — FAMOTIDINE 40 MG/1
40 TABLET, FILM COATED ORAL NIGHTLY
COMMUNITY
Start: 2025-08-03

## 2025-08-18 RX ORDER — DOXYCYCLINE HYCLATE 100 MG
100 TABLET ORAL ONCE
Status: COMPLETED | OUTPATIENT
Start: 2025-08-18 | End: 2025-08-18

## 2025-08-18 RX ORDER — DOXYCYCLINE 100 MG/1
100 TABLET ORAL 2 TIMES DAILY
Qty: 14 TABLET | Refills: 0 | Status: SHIPPED | OUTPATIENT
Start: 2025-08-18 | End: 2025-08-19 | Stop reason: SINTOL

## 2025-08-18 RX ADMIN — DOXYCYCLINE HYCLATE 100 MG: 100 TABLET, COATED ORAL at 19:12

## 2025-08-18 ASSESSMENT — ENCOUNTER SYMPTOMS
FEVER: 0
COUGH: 1
CHEST TIGHTNESS: 0
VOMITING: 0
SINUS PRESSURE: 0
DIARRHEA: 0
WHEEZING: 0
EYE PAIN: 0
EYE ITCHING: 0
ABDOMINAL PAIN: 0
FATIGUE: 0
EYE DISCHARGE: 0
SORE THROAT: 0
STRIDOR: 0
DIZZINESS: 1
CHILLS: 0
SHORTNESS OF BREATH: 1

## 2025-08-18 ASSESSMENT — CURB65 SCORE
RESPIRATORY RATE GREATER THAN OR EQUAL TO 30: NO
HAS CONFUSION: NO
AGE IS GREATER THAN OR EQUAL TO 65: YES
HAS CONFUSION: NO
CURB65 SCORE: 1
BUN IS GREATER THAN 19 MG/DL: NO
AGE IS GREATER THAN OR EQUAL TO 65: YES
SBP LESS THAN 90 OR DBNP LESS THAN 60: NO
SBP LESS THAN 90 OR DBNP LESS THAN 60: NO
RESPIRATORY RATE GREATER THAN OR EQUAL TO 30: NO

## 2025-08-18 ASSESSMENT — PAIN SCALES - GENERAL: PAINLEVEL_OUTOF10: 0 - NO PAIN

## 2025-08-18 ASSESSMENT — PAIN - FUNCTIONAL ASSESSMENT: PAIN_FUNCTIONAL_ASSESSMENT: 0-10

## 2025-08-18 ASSESSMENT — LIFESTYLE VARIABLES
EVER FELT BAD OR GUILTY ABOUT YOUR DRINKING: NO
TOTAL SCORE: 0
EVER HAD A DRINK FIRST THING IN THE MORNING TO STEADY YOUR NERVES TO GET RID OF A HANGOVER: NO
HAVE PEOPLE ANNOYED YOU BY CRITICIZING YOUR DRINKING: NO
HAVE YOU EVER FELT YOU SHOULD CUT DOWN ON YOUR DRINKING: NO

## 2025-08-19 ENCOUNTER — TELEPHONE (OUTPATIENT)
Dept: HEMATOLOGY/ONCOLOGY | Facility: CLINIC | Age: 80
End: 2025-08-19
Payer: MEDICARE

## 2025-08-19 DIAGNOSIS — D50.1 IRON DEFICIENCY ANEMIA DUE TO SIDEROPENIC DYSPHAGIA: ICD-10-CM

## 2025-08-19 RX ORDER — LEVOFLOXACIN 750 MG/1
750 TABLET, FILM COATED ORAL DAILY
Qty: 7 TABLET | Refills: 0 | Status: SHIPPED | OUTPATIENT
Start: 2025-08-19 | End: 2025-08-26

## 2025-08-21 ENCOUNTER — APPOINTMENT (OUTPATIENT)
Dept: HEMATOLOGY/ONCOLOGY | Facility: CLINIC | Age: 80
End: 2025-08-21
Payer: MEDICARE

## 2025-08-27 ENCOUNTER — INFUSION (OUTPATIENT)
Dept: HEMATOLOGY/ONCOLOGY | Facility: CLINIC | Age: 80
End: 2025-08-27
Payer: MEDICARE

## 2025-08-27 VITALS
TEMPERATURE: 97.9 F | DIASTOLIC BLOOD PRESSURE: 73 MMHG | RESPIRATION RATE: 16 BRPM | WEIGHT: 136.47 LBS | SYSTOLIC BLOOD PRESSURE: 113 MMHG | BODY MASS INDEX: 18.51 KG/M2 | OXYGEN SATURATION: 96 % | HEART RATE: 73 BPM

## 2025-08-27 DIAGNOSIS — D50.1 IRON DEFICIENCY ANEMIA DUE TO SIDEROPENIC DYSPHAGIA: ICD-10-CM

## 2025-08-27 DIAGNOSIS — E61.1 IRON DEFICIENCY: ICD-10-CM

## 2025-08-27 LAB
ERYTHROCYTE [DISTWIDTH] IN BLOOD BY AUTOMATED COUNT: 14.6 % (ref 11.5–14.5)
HCT VFR BLD AUTO: 42.3 % (ref 41–52)
HGB BLD-MCNC: 13.4 G/DL (ref 13.5–17.5)
MCH RBC QN AUTO: 31.8 PG (ref 26–34)
MCHC RBC AUTO-ENTMCNC: 31.7 G/DL (ref 32–36)
MCV RBC AUTO: 101 FL (ref 80–100)
NRBC BLD-RTO: ABNORMAL /100{WBCS}
PLATELET # BLD AUTO: 215 X10*3/UL (ref 150–450)
RBC # BLD AUTO: 4.21 X10*6/UL (ref 4.5–5.9)
WBC # BLD AUTO: 6.8 X10*3/UL (ref 4.4–11.3)

## 2025-08-27 PROCEDURE — 96365 THER/PROPH/DIAG IV INF INIT: CPT | Mod: INF

## 2025-08-27 PROCEDURE — 85027 COMPLETE CBC AUTOMATED: CPT

## 2025-08-27 PROCEDURE — 2500000004 HC RX 250 GENERAL PHARMACY W/ HCPCS (ALT 636 FOR OP/ED): Performed by: INTERNAL MEDICINE

## 2025-08-27 RX ORDER — EPINEPHRINE 0.3 MG/.3ML
0.3 INJECTION SUBCUTANEOUS EVERY 5 MIN PRN
Status: DISCONTINUED | OUTPATIENT
Start: 2025-08-27 | End: 2025-08-27 | Stop reason: HOSPADM

## 2025-08-27 RX ORDER — HEPARIN SODIUM,PORCINE/PF 10 UNIT/ML
50 SYRINGE (ML) INTRAVENOUS AS NEEDED
OUTPATIENT
Start: 2025-08-27

## 2025-08-27 RX ORDER — DIPHENHYDRAMINE HYDROCHLORIDE 50 MG/ML
50 INJECTION, SOLUTION INTRAMUSCULAR; INTRAVENOUS AS NEEDED
OUTPATIENT
Start: 2025-08-28

## 2025-08-27 RX ORDER — FAMOTIDINE 10 MG/ML
20 INJECTION, SOLUTION INTRAVENOUS ONCE AS NEEDED
OUTPATIENT
Start: 2025-08-28

## 2025-08-27 RX ORDER — ALBUTEROL SULFATE 0.83 MG/ML
3 SOLUTION RESPIRATORY (INHALATION) AS NEEDED
Status: DISCONTINUED | OUTPATIENT
Start: 2025-08-27 | End: 2025-08-27 | Stop reason: HOSPADM

## 2025-08-27 RX ORDER — FAMOTIDINE 10 MG/ML
20 INJECTION, SOLUTION INTRAVENOUS ONCE AS NEEDED
Status: DISCONTINUED | OUTPATIENT
Start: 2025-08-27 | End: 2025-08-27 | Stop reason: HOSPADM

## 2025-08-27 RX ORDER — EPINEPHRINE 0.3 MG/.3ML
0.3 INJECTION SUBCUTANEOUS EVERY 5 MIN PRN
OUTPATIENT
Start: 2025-08-28

## 2025-08-27 RX ORDER — DIPHENHYDRAMINE HYDROCHLORIDE 50 MG/ML
50 INJECTION, SOLUTION INTRAMUSCULAR; INTRAVENOUS AS NEEDED
Status: DISCONTINUED | OUTPATIENT
Start: 2025-08-27 | End: 2025-08-27 | Stop reason: HOSPADM

## 2025-08-27 RX ORDER — ALBUTEROL SULFATE 0.83 MG/ML
3 SOLUTION RESPIRATORY (INHALATION) AS NEEDED
OUTPATIENT
Start: 2025-08-28

## 2025-08-27 RX ORDER — HEPARIN 100 UNIT/ML
500 SYRINGE INTRAVENOUS AS NEEDED
OUTPATIENT
Start: 2025-08-27

## 2025-08-27 RX ADMIN — FERUMOXYTOL 510 MG: 510 INJECTION INTRAVENOUS at 15:05

## 2025-08-27 ASSESSMENT — PAIN SCALES - GENERAL: PAINLEVEL_OUTOF10: 0-NO PAIN

## 2025-09-05 ENCOUNTER — TELEPHONE (OUTPATIENT)
Dept: HEMATOLOGY/ONCOLOGY | Facility: CLINIC | Age: 80
End: 2025-09-05
Payer: MEDICARE

## 2025-09-05 DIAGNOSIS — J18.9 PNEUMONIA OF BOTH LOWER LOBES DUE TO INFECTIOUS ORGANISM: Primary | ICD-10-CM

## 2025-09-05 RX ORDER — LEVOFLOXACIN 500 MG/1
500 TABLET, FILM COATED ORAL DAILY
Qty: 10 TABLET | Refills: 0 | Status: SHIPPED | OUTPATIENT
Start: 2025-09-05 | End: 2025-09-15

## 2025-10-08 ENCOUNTER — APPOINTMENT (OUTPATIENT)
Dept: CARDIOLOGY | Facility: HOSPITAL | Age: 80
End: 2025-10-08
Payer: MEDICARE

## 2025-11-25 ENCOUNTER — APPOINTMENT (OUTPATIENT)
Dept: DERMATOLOGY | Facility: CLINIC | Age: 80
End: 2025-11-25
Payer: MEDICARE

## (undated) DEVICE — Device

## (undated) DEVICE — WOUND SYSTEM, DEBRIDEMENT & CLEANING, O.R DUOPAK

## (undated) DEVICE — DRAPE, MICROSCOPE, W/REMOVABLE LENS

## (undated) DEVICE — INTRODUCER SHEATH, GLIDESHEATH, 6FR 10CM

## (undated) DEVICE — SEALANT, HEMOSTATIC, FLOSEAL, 10 ML

## (undated) DEVICE — SPONGE, HEMOSTAT, SURGICEL FIBRILLAR, ABS, 4 X 4, LF

## (undated) DEVICE — DRAIN, JACKSON-PRATT, ROUND/W TROCAR, SILICONE, 10FR

## (undated) DEVICE — APPLICATOR, CHLORAPREP, W/ORANGE TINT, 26ML

## (undated) DEVICE — GLOVE, SURGICAL, PROTEXIS PI BLUE W/NEUTHERA, 8.5, PF, LF

## (undated) DEVICE — SUTURE, SILK, 2-0, 18 IN, FSL, BLACK

## (undated) DEVICE — SUTURE, VICRYL, 4-0, 18 IN, UNDYED BR PS-2

## (undated) DEVICE — NEEDLE, ENTRY, PERCUTANEOUS, 21 G X 2.5 CM

## (undated) DEVICE — BUR,  3MM X 3.8MM, NEURODRILL, LESS AGGR

## (undated) DEVICE — SUTURE, VICRYL, 0, 18 IN, CT-1, UNDYED

## (undated) DEVICE — CLOSURE SYSTEM, DERMABOND, PRINEO, 22CM, STERILE

## (undated) DEVICE — TOWEL PACK, STERILE, 4/PACK, BLUE

## (undated) DEVICE — DRESSING, MEPILEX, BORDER FLEX, 6 X 8

## (undated) DEVICE — TR BAND, RADIAL COMPRESSION, STANDARD, 24CM

## (undated) DEVICE — CATHETER, OPTITORQUE, 5FR, TIG1, 1H/100CM

## (undated) DEVICE — ANGIO KIT, LEFT HEART, LF, CUSTOM

## (undated) DEVICE — NEEDLE, ELECTRODE, ELECTROSURGICAL, INSULATED

## (undated) DEVICE — CATHETER TRAY, SURESTEP, 16FR, URINE METER W/STATLOCK

## (undated) DEVICE — EVACUATOR, WOUND, SUCTION, CLOSED, JACKSON-PRATT, 100 CC, SILICONE

## (undated) DEVICE — COVER, EQUIPMENT, DOME COVER, SNAP CAP, 30 IN, CLEAR, LF

## (undated) DEVICE — ELECTRODE, ELECTROSURGICAL, BLADE EXT 4 INCH, INSULATED

## (undated) DEVICE — ELECTRODE, ELECTROSURGICAL, BLADE, INSULATED, ENT/IMA, STERILE

## (undated) DEVICE — CATHETER, ANGIO, IMPULSE, FL3.5, 6 FR X 100 CM

## (undated) DEVICE — SEALER, BIPOLAR, AQUA MANTYS 6.0

## (undated) DEVICE — PIN, SKULL, MAYFIELD, ADULT

## (undated) DEVICE — TUBING, SMOKE EVAC, 3/8 X 10 FT

## (undated) DEVICE — DRAPE, PAD, INSTRUMENT, MAGNETIC, MEDIUM, 10 X 16 IN, DISPOSABLE

## (undated) DEVICE — BONE, MILL, MIDAS REX, DUAL BLADE, ELECTRIC

## (undated) DEVICE — DRESSING, MEPILEX, BORDER FLEX, 3 X 3

## (undated) DEVICE — DRAPE, LAPAROTOMY II, PEDIATRIC

## (undated) DEVICE — SUTURE, MONOCRYL, 4-0, 27 IN, PS-2, UNDYED

## (undated) DEVICE — DRESSING, MEPILEX, POST OP, 8 X 4

## (undated) DEVICE — GUIDEWIRE, INQUIRE, J TIP, .035 X 210CM, FIXED CORE, DIAGNOSTIC

## (undated) DEVICE — SUTURE, PROLENE, 3-0, 30 IN, FSL, BLUE

## (undated) DEVICE — COVER, CART, 45 X 27 X 48 IN, CLEAR

## (undated) DEVICE — SUTURE, VICRYL, 3-0,18 IN, SH, UNDYED

## (undated) DEVICE — BUR, 3MM X 3.8MM, PRECISION, NEURO DRILL

## (undated) DEVICE — DRAPE, C-ARM IMAGE

## (undated) DEVICE — MARKER, SKIN, RULER AND LABEL PACK, CUSTOM

## (undated) DEVICE — DRAIN, WOUND, FLAT, HUBLESS, FULL LENGTH PERFORATION, 7 MM X 20 CM

## (undated) DEVICE — DISTRACTION PIN, 14MM, STERILE

## (undated) DEVICE — MANIFOLD, 4 PORT NEPTUNE STANDARD

## (undated) DEVICE — TOOL, MR8, 12CM MATCH HEAD, 3MM DIA

## (undated) DEVICE — CAUTERY, PENCIL, PUSH BUTTON, SMOKE EVAC, 70MM

## (undated) DEVICE — PAD, GROUNDING, ELECTROSURGICAL, W/9 FT CABLE, POLYHESIVE II, ADULT, LF